# Patient Record
Sex: FEMALE | Race: WHITE | NOT HISPANIC OR LATINO | URBAN - METROPOLITAN AREA
[De-identification: names, ages, dates, MRNs, and addresses within clinical notes are randomized per-mention and may not be internally consistent; named-entity substitution may affect disease eponyms.]

---

## 2022-02-08 ENCOUNTER — OUTPATIENT (OUTPATIENT)
Dept: OUTPATIENT SERVICES | Facility: HOSPITAL | Age: 87
LOS: 1 days | Discharge: HOME | End: 2022-02-08
Payer: MEDICARE

## 2022-02-08 DIAGNOSIS — M79.643 PAIN IN UNSPECIFIED HAND: ICD-10-CM

## 2022-02-08 PROCEDURE — 73130 X-RAY EXAM OF HAND: CPT | Mod: 26,50

## 2022-05-13 ENCOUNTER — OUTPATIENT (OUTPATIENT)
Dept: OUTPATIENT SERVICES | Facility: HOSPITAL | Age: 87
LOS: 1 days | Discharge: HOME | End: 2022-05-13

## 2022-05-13 VITALS
DIASTOLIC BLOOD PRESSURE: 77 MMHG | OXYGEN SATURATION: 98 % | RESPIRATION RATE: 18 BRPM | HEIGHT: 66 IN | TEMPERATURE: 98 F | SYSTOLIC BLOOD PRESSURE: 170 MMHG | WEIGHT: 141.98 LBS | HEART RATE: 83 BPM

## 2022-05-13 VITALS
SYSTOLIC BLOOD PRESSURE: 120 MMHG | DIASTOLIC BLOOD PRESSURE: 57 MMHG | OXYGEN SATURATION: 98 % | RESPIRATION RATE: 18 BRPM

## 2022-05-13 DIAGNOSIS — Z90.710 ACQUIRED ABSENCE OF BOTH CERVIX AND UTERUS: Chronic | ICD-10-CM

## 2022-05-13 DIAGNOSIS — Z90.49 ACQUIRED ABSENCE OF OTHER SPECIFIED PARTS OF DIGESTIVE TRACT: Chronic | ICD-10-CM

## 2022-05-13 NOTE — ASU DISCHARGE PLAN (ADULT/PEDIATRIC) - NS MD DC FALL RISK RISK
For information on Fall & Injury Prevention, visit: [email or web address] OR  [email or web address] OR  https://www.cdc.gov/steadi/patient.html

## 2022-05-13 NOTE — ASU PATIENT PROFILE, ADULT - FALL HARM RISK - HARM RISK INTERVENTIONS

## 2022-05-19 DIAGNOSIS — F17.210 NICOTINE DEPENDENCE, CIGARETTES, UNCOMPLICATED: ICD-10-CM

## 2022-05-19 DIAGNOSIS — I10 ESSENTIAL (PRIMARY) HYPERTENSION: ICD-10-CM

## 2022-05-19 DIAGNOSIS — H26.8 OTHER SPECIFIED CATARACT: ICD-10-CM

## 2022-05-19 DIAGNOSIS — E78.00 PURE HYPERCHOLESTEROLEMIA, UNSPECIFIED: ICD-10-CM

## 2022-05-19 DIAGNOSIS — Z90.710 ACQUIRED ABSENCE OF BOTH CERVIX AND UTERUS: ICD-10-CM

## 2022-05-19 DIAGNOSIS — Z90.49 ACQUIRED ABSENCE OF OTHER SPECIFIED PARTS OF DIGESTIVE TRACT: ICD-10-CM

## 2022-05-27 ENCOUNTER — OUTPATIENT (OUTPATIENT)
Dept: OUTPATIENT SERVICES | Facility: HOSPITAL | Age: 87
LOS: 1 days | Discharge: HOME | End: 2022-05-27

## 2022-05-27 VITALS
RESPIRATION RATE: 17 BRPM | SYSTOLIC BLOOD PRESSURE: 167 MMHG | HEIGHT: 66 IN | DIASTOLIC BLOOD PRESSURE: 76 MMHG | OXYGEN SATURATION: 95 % | TEMPERATURE: 96 F | HEART RATE: 77 BPM | WEIGHT: 141.98 LBS

## 2022-05-27 VITALS — DIASTOLIC BLOOD PRESSURE: 77 MMHG | HEART RATE: 58 BPM | SYSTOLIC BLOOD PRESSURE: 137 MMHG | RESPIRATION RATE: 17 BRPM

## 2022-05-27 DIAGNOSIS — Z90.710 ACQUIRED ABSENCE OF BOTH CERVIX AND UTERUS: Chronic | ICD-10-CM

## 2022-05-27 DIAGNOSIS — Z90.49 ACQUIRED ABSENCE OF OTHER SPECIFIED PARTS OF DIGESTIVE TRACT: Chronic | ICD-10-CM

## 2022-05-27 DIAGNOSIS — Z98.890 OTHER SPECIFIED POSTPROCEDURAL STATES: Chronic | ICD-10-CM

## 2022-05-27 RX ORDER — ATORVASTATIN CALCIUM 80 MG/1
1 TABLET, FILM COATED ORAL
Qty: 0 | Refills: 0 | DISCHARGE

## 2022-05-27 NOTE — ASU PATIENT PROFILE, ADULT - NSICDXPASTSURGICALHX_GEN_ALL_CORE_FT
PAST SURGICAL HISTORY:  H/O: hysterectomy     History of laparoscopic cholecystectomy     History of surgery LEFT EYE CATARACT EXTRACTION WITH LENS  IMPLANT

## 2022-05-27 NOTE — ASU PATIENT PROFILE, ADULT - FALL HARM RISK - UNIVERSAL INTERVENTIONS
Bed in lowest position, wheels locked, appropriate side rails in place/Call bell, personal items and telephone in reach/Instruct patient to call for assistance before getting out of bed or chair/Non-slip footwear when patient is out of bed/[place] to call system/Physically safe environment - no spills, clutter or unnecessary equipment/Purposeful Proactive Rounding/Room/bathroom lighting operational, light cord in reach

## 2022-06-02 DIAGNOSIS — F17.210 NICOTINE DEPENDENCE, CIGARETTES, UNCOMPLICATED: ICD-10-CM

## 2022-06-02 DIAGNOSIS — H26.8 OTHER SPECIFIED CATARACT: ICD-10-CM

## 2022-06-02 DIAGNOSIS — I10 ESSENTIAL (PRIMARY) HYPERTENSION: ICD-10-CM

## 2022-06-02 DIAGNOSIS — Z90.710 ACQUIRED ABSENCE OF BOTH CERVIX AND UTERUS: ICD-10-CM

## 2022-06-02 DIAGNOSIS — Z90.49 ACQUIRED ABSENCE OF OTHER SPECIFIED PARTS OF DIGESTIVE TRACT: ICD-10-CM

## 2022-06-02 DIAGNOSIS — E78.00 PURE HYPERCHOLESTEROLEMIA, UNSPECIFIED: ICD-10-CM

## 2022-11-30 PROBLEM — F17.210 NICOTINE DEPENDENCE, CIGARETTES, UNCOMPLICATED: Chronic | Status: ACTIVE | Noted: 2022-05-13

## 2022-11-30 PROBLEM — E78.00 PURE HYPERCHOLESTEROLEMIA, UNSPECIFIED: Chronic | Status: ACTIVE | Noted: 2022-05-13

## 2022-11-30 PROBLEM — I10 ESSENTIAL (PRIMARY) HYPERTENSION: Chronic | Status: ACTIVE | Noted: 2022-05-13

## 2023-01-03 ENCOUNTER — APPOINTMENT (OUTPATIENT)
Dept: RHEUMATOLOGY | Facility: CLINIC | Age: 88
End: 2023-01-03

## 2023-02-08 PROBLEM — Z00.00 ENCOUNTER FOR PREVENTIVE HEALTH EXAMINATION: Status: ACTIVE | Noted: 2023-02-08

## 2023-11-17 NOTE — ASU PATIENT PROFILE, ADULT - CAREGIVER PHONE NUMBER
Detail Level: Simple [phone] Detail Level: Detailed Initiate Treatment: triamcinolone acetonide 0.1 % topical cream BID\\nQuantity: 80.0 g  Days Supply: 30\\nSig: Apply twice daily to rash up to 2 weeks/month as needed

## 2024-03-05 ENCOUNTER — INPATIENT (INPATIENT)
Facility: HOSPITAL | Age: 89
LOS: 6 days | Discharge: NURSING, REHAB OR LONG-TERM CARE | DRG: 521 | End: 2024-03-12
Attending: STUDENT IN AN ORGANIZED HEALTH CARE EDUCATION/TRAINING PROGRAM | Admitting: STUDENT IN AN ORGANIZED HEALTH CARE EDUCATION/TRAINING PROGRAM
Payer: MEDICARE

## 2024-03-05 VITALS
TEMPERATURE: 98 F | OXYGEN SATURATION: 99 % | SYSTOLIC BLOOD PRESSURE: 176 MMHG | DIASTOLIC BLOOD PRESSURE: 72 MMHG | HEART RATE: 104 BPM

## 2024-03-05 DIAGNOSIS — Z90.710 ACQUIRED ABSENCE OF BOTH CERVIX AND UTERUS: Chronic | ICD-10-CM

## 2024-03-05 DIAGNOSIS — Z90.49 ACQUIRED ABSENCE OF OTHER SPECIFIED PARTS OF DIGESTIVE TRACT: Chronic | ICD-10-CM

## 2024-03-05 DIAGNOSIS — S72.002A FRACTURE OF UNSPECIFIED PART OF NECK OF LEFT FEMUR, INITIAL ENCOUNTER FOR CLOSED FRACTURE: ICD-10-CM

## 2024-03-05 DIAGNOSIS — Z98.890 OTHER SPECIFIED POSTPROCEDURAL STATES: Chronic | ICD-10-CM

## 2024-03-05 LAB
ALBUMIN SERPL ELPH-MCNC: 4.1 G/DL — SIGNIFICANT CHANGE UP (ref 3.5–5.2)
ALP SERPL-CCNC: 87 U/L — SIGNIFICANT CHANGE UP (ref 30–115)
ALT FLD-CCNC: 17 U/L — SIGNIFICANT CHANGE UP (ref 0–41)
ANION GAP SERPL CALC-SCNC: 10 MMOL/L — SIGNIFICANT CHANGE UP (ref 7–14)
APTT BLD: 28.2 SEC — SIGNIFICANT CHANGE UP (ref 27–39.2)
AST SERPL-CCNC: 14 U/L — SIGNIFICANT CHANGE UP (ref 0–41)
BASOPHILS # BLD AUTO: 0.06 K/UL — SIGNIFICANT CHANGE UP (ref 0–0.2)
BASOPHILS NFR BLD AUTO: 0.6 % — SIGNIFICANT CHANGE UP (ref 0–1)
BILIRUB SERPL-MCNC: 0.2 MG/DL — SIGNIFICANT CHANGE UP (ref 0.2–1.2)
BUN SERPL-MCNC: 22 MG/DL — HIGH (ref 10–20)
CALCIUM SERPL-MCNC: 8.9 MG/DL — SIGNIFICANT CHANGE UP (ref 8.4–10.5)
CHLORIDE SERPL-SCNC: 99 MMOL/L — SIGNIFICANT CHANGE UP (ref 98–110)
CO2 SERPL-SCNC: 28 MMOL/L — SIGNIFICANT CHANGE UP (ref 17–32)
CREAT SERPL-MCNC: 1 MG/DL — SIGNIFICANT CHANGE UP (ref 0.7–1.5)
EGFR: 55 ML/MIN/1.73M2 — LOW
EOSINOPHIL # BLD AUTO: 0.06 K/UL — SIGNIFICANT CHANGE UP (ref 0–0.7)
EOSINOPHIL NFR BLD AUTO: 0.6 % — SIGNIFICANT CHANGE UP (ref 0–8)
GLUCOSE SERPL-MCNC: 171 MG/DL — HIGH (ref 70–99)
HCT VFR BLD CALC: 42.3 % — SIGNIFICANT CHANGE UP (ref 37–47)
HGB BLD-MCNC: 14.2 G/DL — SIGNIFICANT CHANGE UP (ref 12–16)
IMM GRANULOCYTES NFR BLD AUTO: 0.8 % — HIGH (ref 0.1–0.3)
INR BLD: 1.03 RATIO — SIGNIFICANT CHANGE UP (ref 0.65–1.3)
LYMPHOCYTES # BLD AUTO: 3.32 K/UL — SIGNIFICANT CHANGE UP (ref 1.2–3.4)
LYMPHOCYTES # BLD AUTO: 31.9 % — SIGNIFICANT CHANGE UP (ref 20.5–51.1)
MCHC RBC-ENTMCNC: 28.7 PG — SIGNIFICANT CHANGE UP (ref 27–31)
MCHC RBC-ENTMCNC: 33.6 G/DL — SIGNIFICANT CHANGE UP (ref 32–37)
MCV RBC AUTO: 85.6 FL — SIGNIFICANT CHANGE UP (ref 81–99)
MONOCYTES # BLD AUTO: 0.85 K/UL — HIGH (ref 0.1–0.6)
MONOCYTES NFR BLD AUTO: 8.2 % — SIGNIFICANT CHANGE UP (ref 1.7–9.3)
NEUTROPHILS # BLD AUTO: 6.03 K/UL — SIGNIFICANT CHANGE UP (ref 1.4–6.5)
NEUTROPHILS NFR BLD AUTO: 57.9 % — SIGNIFICANT CHANGE UP (ref 42.2–75.2)
NRBC # BLD: 0 /100 WBCS — SIGNIFICANT CHANGE UP (ref 0–0)
PLATELET # BLD AUTO: 249 K/UL — SIGNIFICANT CHANGE UP (ref 130–400)
PMV BLD: 9.7 FL — SIGNIFICANT CHANGE UP (ref 7.4–10.4)
POTASSIUM SERPL-MCNC: 3.5 MMOL/L — SIGNIFICANT CHANGE UP (ref 3.5–5)
POTASSIUM SERPL-SCNC: 3.5 MMOL/L — SIGNIFICANT CHANGE UP (ref 3.5–5)
PROT SERPL-MCNC: 6.9 G/DL — SIGNIFICANT CHANGE UP (ref 6–8)
PROTHROM AB SERPL-ACNC: 11.8 SEC — SIGNIFICANT CHANGE UP (ref 9.95–12.87)
RBC # BLD: 4.94 M/UL — SIGNIFICANT CHANGE UP (ref 4.2–5.4)
RBC # FLD: 14.8 % — HIGH (ref 11.5–14.5)
SODIUM SERPL-SCNC: 137 MMOL/L — SIGNIFICANT CHANGE UP (ref 135–146)
WBC # BLD: 10.4 K/UL — SIGNIFICANT CHANGE UP (ref 4.8–10.8)
WBC # FLD AUTO: 10.4 K/UL — SIGNIFICANT CHANGE UP (ref 4.8–10.8)

## 2024-03-05 PROCEDURE — 85610 PROTHROMBIN TIME: CPT

## 2024-03-05 PROCEDURE — 94640 AIRWAY INHALATION TREATMENT: CPT

## 2024-03-05 PROCEDURE — 83935 ASSAY OF URINE OSMOLALITY: CPT

## 2024-03-05 PROCEDURE — 85730 THROMBOPLASTIN TIME PARTIAL: CPT

## 2024-03-05 PROCEDURE — 88311 DECALCIFY TISSUE: CPT

## 2024-03-05 PROCEDURE — 70450 CT HEAD/BRAIN W/O DYE: CPT | Mod: 26,MC

## 2024-03-05 PROCEDURE — C1776: CPT

## 2024-03-05 PROCEDURE — 84133 ASSAY OF URINE POTASSIUM: CPT

## 2024-03-05 PROCEDURE — 71045 X-RAY EXAM CHEST 1 VIEW: CPT | Mod: 26

## 2024-03-05 PROCEDURE — 36415 COLL VENOUS BLD VENIPUNCTURE: CPT

## 2024-03-05 PROCEDURE — 93005 ELECTROCARDIOGRAM TRACING: CPT

## 2024-03-05 PROCEDURE — 73562 X-RAY EXAM OF KNEE 3: CPT | Mod: 26,LT

## 2024-03-05 PROCEDURE — 83930 ASSAY OF BLOOD OSMOLALITY: CPT

## 2024-03-05 PROCEDURE — 80048 BASIC METABOLIC PNL TOTAL CA: CPT

## 2024-03-05 PROCEDURE — 88305 TISSUE EXAM BY PATHOLOGIST: CPT

## 2024-03-05 PROCEDURE — 84300 ASSAY OF URINE SODIUM: CPT

## 2024-03-05 PROCEDURE — 93010 ELECTROCARDIOGRAM REPORT: CPT

## 2024-03-05 PROCEDURE — 97116 GAIT TRAINING THERAPY: CPT | Mod: GP

## 2024-03-05 PROCEDURE — 97110 THERAPEUTIC EXERCISES: CPT | Mod: GP

## 2024-03-05 PROCEDURE — 81003 URINALYSIS AUTO W/O SCOPE: CPT

## 2024-03-05 PROCEDURE — C1889: CPT

## 2024-03-05 PROCEDURE — 86850 RBC ANTIBODY SCREEN: CPT

## 2024-03-05 PROCEDURE — 84484 ASSAY OF TROPONIN QUANT: CPT

## 2024-03-05 PROCEDURE — 72125 CT NECK SPINE W/O DYE: CPT | Mod: 26,MC

## 2024-03-05 PROCEDURE — 97162 PT EVAL MOD COMPLEX 30 MIN: CPT | Mod: GP

## 2024-03-05 PROCEDURE — 72170 X-RAY EXAM OF PELVIS: CPT

## 2024-03-05 PROCEDURE — C1713: CPT

## 2024-03-05 PROCEDURE — 86900 BLOOD TYPING SEROLOGIC ABO: CPT

## 2024-03-05 PROCEDURE — 72170 X-RAY EXAM OF PELVIS: CPT | Mod: 26

## 2024-03-05 PROCEDURE — 83735 ASSAY OF MAGNESIUM: CPT

## 2024-03-05 PROCEDURE — 71045 X-RAY EXAM CHEST 1 VIEW: CPT

## 2024-03-05 PROCEDURE — 87635 SARS-COV-2 COVID-19 AMP PRB: CPT

## 2024-03-05 PROCEDURE — 73552 X-RAY EXAM OF FEMUR 2/>: CPT | Mod: 26,LT

## 2024-03-05 PROCEDURE — 80053 COMPREHEN METABOLIC PANEL: CPT

## 2024-03-05 PROCEDURE — 85025 COMPLETE CBC W/AUTO DIFF WBC: CPT

## 2024-03-05 PROCEDURE — 97166 OT EVAL MOD COMPLEX 45 MIN: CPT | Mod: GO

## 2024-03-05 PROCEDURE — 86901 BLOOD TYPING SEROLOGIC RH(D): CPT

## 2024-03-05 PROCEDURE — C9399: CPT

## 2024-03-05 PROCEDURE — 99285 EMERGENCY DEPT VISIT HI MDM: CPT

## 2024-03-05 PROCEDURE — 93306 TTE W/DOPPLER COMPLETE: CPT

## 2024-03-05 PROCEDURE — 97530 THERAPEUTIC ACTIVITIES: CPT | Mod: GP

## 2024-03-05 RX ORDER — MORPHINE SULFATE 50 MG/1
4 CAPSULE, EXTENDED RELEASE ORAL ONCE
Refills: 0 | Status: DISCONTINUED | OUTPATIENT
Start: 2024-03-05 | End: 2024-03-05

## 2024-03-05 RX ORDER — HYDROMORPHONE HYDROCHLORIDE 2 MG/ML
1 INJECTION INTRAMUSCULAR; INTRAVENOUS; SUBCUTANEOUS ONCE
Refills: 0 | Status: DISCONTINUED | OUTPATIENT
Start: 2024-03-05 | End: 2024-03-05

## 2024-03-05 RX ADMIN — HYDROMORPHONE HYDROCHLORIDE 1 MILLIGRAM(S): 2 INJECTION INTRAMUSCULAR; INTRAVENOUS; SUBCUTANEOUS at 23:44

## 2024-03-05 RX ADMIN — HYDROMORPHONE HYDROCHLORIDE 1 MILLIGRAM(S): 2 INJECTION INTRAMUSCULAR; INTRAVENOUS; SUBCUTANEOUS at 20:24

## 2024-03-05 RX ADMIN — MORPHINE SULFATE 4 MILLIGRAM(S): 50 CAPSULE, EXTENDED RELEASE ORAL at 20:10

## 2024-03-05 NOTE — ED PROVIDER NOTE - PHYSICAL EXAMINATION
Physical Exam    Vital Signs: I have reviewed the initial vital signs.  Constitutional: well-nourished, appears stated age, no acute distress  Eyes: Conjunctiva pink, Sclera clear, PERRLA, EOMI without pain.   Cardiovascular: S1 and S2, regular rate, regular rhythm, well-perfused extremities, pedal pulses equal and 2+ b/l.   Respiratory: unlabored respiratory effort, clear to auscultation bilaterally no wheezing, rales and rhonchi. pt is speaking full sentences. no accessory muscle use.   Gastrointestinal: soft, non-tender, nondistended abdomen, no pulsatile mass, no rebound, no guarding  Musculoskeletal: supple neck, no lower extremity edema, no calf tenderness, no midline tenderness, no palpable spinal step offs, (+) tenderness over the left hip, unable to move left lower extremity due to pain, pt left lower extremity is shortened, flexed at the knee, and externally rotated. FROM of b/l upper extremities and right upper extremity.   Integumentary: warm, dry, no rash  Neurologic: awake, alert, extremities’ motor and sensory functions grossly intact.   Psychiatric: appropriate mood, appropriate affect

## 2024-03-05 NOTE — ED ADULT TRIAGE NOTE - CHIEF COMPLAINT QUOTE
Pt presents to the ED w/ c/o of left hip pain fall going up steps. Pt was found by EMS at the top of the steps. No HT, no LOC, no AC. Pt's left hip is currently in pain.

## 2024-03-05 NOTE — ED ADULT NURSE NOTE - NSFALLHARMRISKINTERV_ED_ALL_ED
Detail Level: Simple Additional Notes: Patient consent was obtained to proceed with the visit and recommended plan of care after discussion of all risks and benefits, including the risks of COVID-19 exposure. Assistance OOB with selected safe patient handling equipment if applicable/Assistance with ambulation/Communicate risk of Fall with Harm to all staff, patient, and family/Monitor gait and stability/Provide visual cue: red socks, yellow wristband, yellow gown, etc/Reinforce activity limits and safety measures with patient and family/Bed in lowest position, wheels locked, appropriate side rails in place/Call bell, personal items and telephone in reach/Instruct patient to call for assistance before getting out of bed/chair/stretcher/Non-slip footwear applied when patient is off stretcher/[place] to call system/Physically safe environment - no spills, clutter or unnecessary equipment/Purposeful Proactive Rounding/Room/bathroom lighting operational, light cord in reach

## 2024-03-05 NOTE — ED PROVIDER NOTE - CLINICAL SUMMARY MEDICAL DECISION MAKING FREE TEXT BOX
88yF p/w L thigh pain after fall.  Pt quite uncomfortable and LLE shortened, rotated and flexed at the knee.  Imaging confirm L impacted femoral neck fx.  Pt NVI despite fx and pain treated w/ multiple doses of opiates.  Labs reassuring.  D/w ortho and will adm to medicine for further care.

## 2024-03-05 NOTE — ED PROVIDER NOTE - OBJECTIVE STATEMENT
88-year-old female with a past medical history of hypertension, hyperlipidemia, dementia presents to the ED for evaluation of left hip pain and deformity status post mechanical fall after running up the stairs this evening.  Patient reports the stairs to answer the telephone when she got to the top of the steps she tripped and fell landing on her left side.  Patient reports she was unable to get up on her own and she was found sitting on the floor with her back up against the wall by a family member who lives above her.  Patient was recently admitted at [facility] for disorientation.  Patient denies headaches, LOC, head trauma, use of blood thinners, neck pain, back pain, chest pain, shortness of breath, abdominal pain, nausea, vomiting, diarrhea, constipation, urinary symptoms, weakness, numbness, or tingling.

## 2024-03-05 NOTE — ED PROVIDER NOTE - ATTENDING APP SHARED VISIT CONTRIBUTION OF CARE
88yF HTN DLD ?dementia p/w fall on stairs - was almost at the top stair when she tripped bc she was running.  C/o severe L posterior upper thigh pain and is holding leg w/ knee midflexed, shortened and rotated and screaming in pain with transitions (or bumps in the road as per EMS).

## 2024-03-06 ENCOUNTER — RESULT REVIEW (OUTPATIENT)
Age: 89
End: 2024-03-06

## 2024-03-06 LAB — TROPONIN T, HIGH SENSITIVITY RESULT: 28 NG/L — HIGH (ref 6–13)

## 2024-03-06 PROCEDURE — 99222 1ST HOSP IP/OBS MODERATE 55: CPT

## 2024-03-06 PROCEDURE — 88311 DECALCIFY TISSUE: CPT | Mod: 26

## 2024-03-06 PROCEDURE — 88305 TISSUE EXAM BY PATHOLOGIST: CPT | Mod: 26

## 2024-03-06 PROCEDURE — 72170 X-RAY EXAM OF PELVIS: CPT | Mod: 26

## 2024-03-06 PROCEDURE — 93306 TTE W/DOPPLER COMPLETE: CPT | Mod: 26

## 2024-03-06 PROCEDURE — 27236 TREAT THIGH FRACTURE: CPT | Mod: LT

## 2024-03-06 PROCEDURE — 93010 ELECTROCARDIOGRAM REPORT: CPT

## 2024-03-06 RX ORDER — HEPARIN SODIUM 5000 [USP'U]/ML
5000 INJECTION INTRAVENOUS; SUBCUTANEOUS EVERY 12 HOURS
Refills: 0 | Status: DISCONTINUED | OUTPATIENT
Start: 2024-03-07 | End: 2024-03-12

## 2024-03-06 RX ORDER — AMLODIPINE BESYLATE 2.5 MG/1
10 TABLET ORAL DAILY
Refills: 0 | Status: DISCONTINUED | OUTPATIENT
Start: 2024-03-06 | End: 2024-03-12

## 2024-03-06 RX ORDER — SENNA PLUS 8.6 MG/1
2 TABLET ORAL AT BEDTIME
Refills: 0 | Status: DISCONTINUED | OUTPATIENT
Start: 2024-03-06 | End: 2024-03-06

## 2024-03-06 RX ORDER — ACETAMINOPHEN 500 MG
650 TABLET ORAL EVERY 6 HOURS
Refills: 0 | Status: DISCONTINUED | OUTPATIENT
Start: 2024-03-06 | End: 2024-03-12

## 2024-03-06 RX ORDER — AMLODIPINE BESYLATE 2.5 MG/1
10 TABLET ORAL DAILY
Refills: 0 | Status: DISCONTINUED | OUTPATIENT
Start: 2024-03-06 | End: 2024-03-06

## 2024-03-06 RX ORDER — HYDROMORPHONE HYDROCHLORIDE 2 MG/ML
1 INJECTION INTRAMUSCULAR; INTRAVENOUS; SUBCUTANEOUS EVERY 4 HOURS
Refills: 0 | Status: DISCONTINUED | OUTPATIENT
Start: 2024-03-06 | End: 2024-03-12

## 2024-03-06 RX ORDER — ACETAMINOPHEN 500 MG
650 TABLET ORAL EVERY 6 HOURS
Refills: 0 | Status: DISCONTINUED | OUTPATIENT
Start: 2024-03-06 | End: 2024-03-06

## 2024-03-06 RX ORDER — HEPARIN SODIUM 5000 [USP'U]/ML
5000 INJECTION INTRAVENOUS; SUBCUTANEOUS ONCE
Refills: 0 | Status: COMPLETED | OUTPATIENT
Start: 2024-03-06 | End: 2024-03-06

## 2024-03-06 RX ORDER — DONEPEZIL HYDROCHLORIDE 10 MG/1
10 TABLET, FILM COATED ORAL AT BEDTIME
Refills: 0 | Status: DISCONTINUED | OUTPATIENT
Start: 2024-03-06 | End: 2024-03-12

## 2024-03-06 RX ORDER — DONEPEZIL HYDROCHLORIDE 10 MG/1
1 TABLET, FILM COATED ORAL
Refills: 0 | DISCHARGE

## 2024-03-06 RX ORDER — OXYCODONE AND ACETAMINOPHEN 5; 325 MG/1; MG/1
1 TABLET ORAL EVERY 6 HOURS
Refills: 0 | Status: DISCONTINUED | OUTPATIENT
Start: 2024-03-06 | End: 2024-03-07

## 2024-03-06 RX ORDER — CEFAZOLIN SODIUM 1 G
2000 VIAL (EA) INJECTION EVERY 8 HOURS
Refills: 0 | Status: COMPLETED | OUTPATIENT
Start: 2024-03-06 | End: 2024-03-07

## 2024-03-06 RX ORDER — SODIUM CHLORIDE 9 MG/ML
1000 INJECTION INTRAMUSCULAR; INTRAVENOUS; SUBCUTANEOUS
Refills: 0 | Status: DISCONTINUED | OUTPATIENT
Start: 2024-03-06 | End: 2024-03-07

## 2024-03-06 RX ORDER — SENNA PLUS 8.6 MG/1
2 TABLET ORAL AT BEDTIME
Refills: 0 | Status: DISCONTINUED | OUTPATIENT
Start: 2024-03-06 | End: 2024-03-12

## 2024-03-06 RX ORDER — ATORVASTATIN CALCIUM 80 MG/1
40 TABLET, FILM COATED ORAL AT BEDTIME
Refills: 0 | Status: DISCONTINUED | OUTPATIENT
Start: 2024-03-06 | End: 2024-03-12

## 2024-03-06 RX ORDER — HYDROMORPHONE HYDROCHLORIDE 2 MG/ML
1 INJECTION INTRAMUSCULAR; INTRAVENOUS; SUBCUTANEOUS EVERY 4 HOURS
Refills: 0 | Status: DISCONTINUED | OUTPATIENT
Start: 2024-03-06 | End: 2024-03-06

## 2024-03-06 RX ORDER — HEPARIN SODIUM 5000 [USP'U]/ML
5000 INJECTION INTRAVENOUS; SUBCUTANEOUS EVERY 12 HOURS
Refills: 0 | Status: DISCONTINUED | OUTPATIENT
Start: 2024-03-06 | End: 2024-03-06

## 2024-03-06 RX ORDER — ATORVASTATIN CALCIUM 80 MG/1
40 TABLET, FILM COATED ORAL AT BEDTIME
Refills: 0 | Status: DISCONTINUED | OUTPATIENT
Start: 2024-03-06 | End: 2024-03-06

## 2024-03-06 RX ORDER — HYDROMORPHONE HYDROCHLORIDE 2 MG/ML
1 INJECTION INTRAMUSCULAR; INTRAVENOUS; SUBCUTANEOUS ONCE
Refills: 0 | Status: DISCONTINUED | OUTPATIENT
Start: 2024-03-06 | End: 2024-03-06

## 2024-03-06 RX ORDER — AMLODIPINE BESYLATE 2.5 MG/1
1 TABLET ORAL
Qty: 0 | Refills: 0 | DISCHARGE

## 2024-03-06 RX ORDER — POLYETHYLENE GLYCOL 3350 17 G/17G
17 POWDER, FOR SOLUTION ORAL
Refills: 0 | Status: DISCONTINUED | OUTPATIENT
Start: 2024-03-06 | End: 2024-03-06

## 2024-03-06 RX ORDER — POLYETHYLENE GLYCOL 3350 17 G/17G
17 POWDER, FOR SOLUTION ORAL
Refills: 0 | Status: DISCONTINUED | OUTPATIENT
Start: 2024-03-06 | End: 2024-03-12

## 2024-03-06 RX ORDER — DONEPEZIL HYDROCHLORIDE 10 MG/1
10 TABLET, FILM COATED ORAL AT BEDTIME
Refills: 0 | Status: DISCONTINUED | OUTPATIENT
Start: 2024-03-06 | End: 2024-03-06

## 2024-03-06 RX ORDER — HEPARIN SODIUM 5000 [USP'U]/ML
5000 INJECTION INTRAVENOUS; SUBCUTANEOUS ONCE
Refills: 0 | Status: DISCONTINUED | OUTPATIENT
Start: 2024-03-06 | End: 2024-03-06

## 2024-03-06 RX ADMIN — HYDROMORPHONE HYDROCHLORIDE 1 MILLIGRAM(S): 2 INJECTION INTRAMUSCULAR; INTRAVENOUS; SUBCUTANEOUS at 23:45

## 2024-03-06 RX ADMIN — HYDROMORPHONE HYDROCHLORIDE 1 MILLIGRAM(S): 2 INJECTION INTRAMUSCULAR; INTRAVENOUS; SUBCUTANEOUS at 13:55

## 2024-03-06 RX ADMIN — HYDROMORPHONE HYDROCHLORIDE 1 MILLIGRAM(S): 2 INJECTION INTRAMUSCULAR; INTRAVENOUS; SUBCUTANEOUS at 09:41

## 2024-03-06 RX ADMIN — HYDROMORPHONE HYDROCHLORIDE 1 MILLIGRAM(S): 2 INJECTION INTRAMUSCULAR; INTRAVENOUS; SUBCUTANEOUS at 03:02

## 2024-03-06 RX ADMIN — HEPARIN SODIUM 5000 UNIT(S): 5000 INJECTION INTRAVENOUS; SUBCUTANEOUS at 06:03

## 2024-03-06 RX ADMIN — SODIUM CHLORIDE 75 MILLILITER(S): 9 INJECTION INTRAMUSCULAR; INTRAVENOUS; SUBCUTANEOUS at 06:04

## 2024-03-06 RX ADMIN — POLYETHYLENE GLYCOL 3350 17 GRAM(S): 17 POWDER, FOR SOLUTION ORAL at 06:02

## 2024-03-06 RX ADMIN — Medication 100 MILLIGRAM(S): at 22:17

## 2024-03-06 RX ADMIN — ATORVASTATIN CALCIUM 40 MILLIGRAM(S): 80 TABLET, FILM COATED ORAL at 22:18

## 2024-03-06 RX ADMIN — HYDROMORPHONE HYDROCHLORIDE 1 MILLIGRAM(S): 2 INJECTION INTRAMUSCULAR; INTRAVENOUS; SUBCUTANEOUS at 04:01

## 2024-03-06 RX ADMIN — HYDROMORPHONE HYDROCHLORIDE 1 MILLIGRAM(S): 2 INJECTION INTRAMUSCULAR; INTRAVENOUS; SUBCUTANEOUS at 08:06

## 2024-03-06 RX ADMIN — SENNA PLUS 2 TABLET(S): 8.6 TABLET ORAL at 22:17

## 2024-03-06 RX ADMIN — DONEPEZIL HYDROCHLORIDE 10 MILLIGRAM(S): 10 TABLET, FILM COATED ORAL at 22:18

## 2024-03-06 RX ADMIN — Medication 650 MILLIGRAM(S): at 11:42

## 2024-03-06 NOTE — H&P ADULT - ASSESSMENT
Patient is an 89 yo F w/PMH of HTN, HLD, and dementia who presented to the ED with L hip pain and deformity after tripping on the stairs, falling to her left side, and being unable to get on her own now admitted to medicine for management of L femoral hip fracture pending L hip hemiarthroplasty by orthopedic surgery.    #Mechanical fall, no syncope  #L femoral hip fracture  *Pelvic XR (3/5): LEFT femoral neck fracture with mild superior displacement and impaction. No right hip fracture.  *L femur XR (3/5): Acute femoral neck fracture with slight superior dislocation and impaction. No shaft fracture.  *CXR (3/5): No focal consolidation, pneumothorax or pleural effusion. Unremarkable cardiomediastinal silhouette. No radiographically evident acute displaced fracture within the limitations of this exam.  - Orthopedic surgery recs (3/5): plan for Left hip hemiarthroplasty 3/6/2024 pending medical clearance and OR availabilty, Bed rest, HOLD DVT PPx, Adult Medicine pre-op risk stratification / medical optimization, 2u RBC on hold for surgery, NPO for surgery today 3/6  - Pending      #MISC  - DVT PPx: On SCDs, holding Lovenox SQ  - GI PPx:   - Diet: NPO for procedure  - Activity: Bedrest Patient is an 89 yo F w/PMH of HTN, HLD, and dementia who presented to the ED with L hip pain and deformity after tripping on the stairs, falling to her left side, and being unable to get on her own now admitted to medicine for management of L femoral hip fracture pending L hip hemiarthroplasty by orthopedic surgery.    #Mechanical fall, no syncope  #L femoral hip fracture  *Pelvic XR (3/5): LEFT femoral neck fracture with mild superior displacement and impaction. No right hip fracture.  *L femur XR (3/5): Acute femoral neck fracture with slight superior dislocation and impaction. No shaft fracture.  *CXR (3/5): No focal consolidation, pneumothorax or pleural effusion. Unremarkable cardiomediastinal silhouette. No radiographically evident acute displaced fracture within the limitations of this exam.  - Orthopedic surgery recs (3/5): plan for Left hip hemiarthroplasty 3/6/2024 pending medical clearance and OR availabilty, Bed rest, HOLD DVT PPx, Adult Medicine pre-op risk stratification / medical optimization, 2u RBC on hold for surgery, NPO for surgery today 3/6  - Pending L hip arthroplasty on 3/6, NPO, IVF  - Pending pre-op clearance  - Pain management: Dilaudid IV 1 mg q4h PRN for severe pain, oxycodone 5 mg q6h PRN for moderate pain, tylenol 975 mg tid standing,   - Bowel reg: Senna and miralax    #HTN    #HLD    #Dementia    #MISC  - DVT PPx: On SCDs, holding Lovenox SQ  - GI PPx:   - Diet: NPO for procedure  - Activity: Bedrest Patient is an 89 yo F w/PMH of HTN, HLD, and dementia who presented to the ED with L hip pain and deformity after tripping on the stairs, falling to her left side, and being unable to get on her own now admitted to medicine for management of L femoral hip fracture pending L hip hemiarthroplasty by orthopedic surgery.    *Please obtain med rec in AM, patient does not remember her meds and does not have a list with her, daughter will be here in the AM.    #Mechanical fall, no syncope  #L femoral hip fracture  *Pelvic XR (3/5): LEFT femoral neck fracture with mild superior displacement and impaction. No right hip fracture.  *L femur XR (3/5): Acute femoral neck fracture with slight superior dislocation and impaction. No shaft fracture.  *CXR (3/5): No focal consolidation, pneumothorax or pleural effusion. Unremarkable cardiomediastinal silhouette. No radiographically evident acute displaced fracture within the limitations of this exam.  - Orthopedic surgery recs (3/5): plan for Left hip hemiarthroplasty 3/6/2024 pending medical clearance and OR availabilty, Bed rest, HOLD DVT PPx, Adult Medicine pre-op risk stratification / medical optimization, 2u RBC on hold for surgery, NPO for surgery today 3/6  - Pending L hip arthroplasty on 3/6, NPO, IVF  - Pending pre-op clearance  - Pain management: Dilaudid IV 1 mg q4h PRN for severe pain, oxycodone 5 mg q6h PRN for moderate pain, tylenol 975 mg tid standing,   - Bowel reg: Senna and miralax    #HTN  - Obtain med rec in AM    #HLD  - Obtain med rec in AM    #Dementia  Patient reports worsening forgetfulness lately  - Patient reports she has an appointment with a neurologist outpatient    #MISC  - DVT PPx: On SCDs, holding Lovenox SQ  - GI PPx: Not indicated  - Diet: NPO for procedure  - Activity: Bedrest Patient is an 89 yo F w/PMH of HTN, HLD, and dementia who presented to the ED with L hip pain and deformity after tripping on the stairs, falling to her left side, and being unable to get on her own now admitted to medicine for management of L femoral hip fracture pending L hip hemiarthroplasty by orthopedic surgery.    *Please obtain med rec in AM, patient does not remember her meds and does not have a list with her, daughter will be here in the AM.    #Mechanical fall, no syncope  #L femoral hip fracture  *Pelvic XR (3/5): LEFT femoral neck fracture with mild superior displacement and impaction. No right hip fracture.  *L femur XR (3/5): Acute femoral neck fracture with slight superior dislocation and impaction. No shaft fracture.  *CXR (3/5): No focal consolidation, pneumothorax or pleural effusion. Unremarkable cardiomediastinal silhouette. No radiographically evident acute displaced fracture within the limitations of this exam.  - Orthopedic surgery recs (3/5): plan for Left hip hemiarthroplasty 3/6/2024 pending medical clearance and OR availabilty, Bed rest, HOLD DVT PPx, Adult Medicine pre-op risk stratification / medical optimization, 2u RBC on hold for surgery, NPO for surgery today 3/6  - Pending L hip arthroplasty on 3/6, NPO, IVF  - Pending pre-op clearance  - Pain management: Dilaudid IV 1 mg q4h PRN for severe pain, oxycodone 5 mg q6h PRN for moderate pain, tylenol 650 mg q6h prn mild pain  - Bowel reg: Senna and miralax    #HTN  - Obtain med rec in AM    #HLD  - Obtain med rec in AM    #Dementia  Patient reports worsening forgetfulness lately  - Patient reports she has an appointment with a neurologist outpatient    #MISC  - DVT PPx: On SCDs, holding Lovenox SQ  - GI PPx: Not indicated  - Diet: NPO for procedure  - Activity: Bedrest Patient is an 89 yo F w/PMH of HTN, HLD, and dementia who presented to the ED with L hip pain and deformity after tripping on the stairs, falling to her left side, and being unable to get on her own now admitted to medicine for management of L femoral hip fracture pending L hip hemiarthroplasty by orthopedic surgery.    *Please obtain med rec in AM, patient does not remember her meds and does not have a list with her, daughter will be here in the AM.    #Mechanical fall, no syncope  #L femoral hip fracture  *Pelvic XR (3/5): LEFT femoral neck fracture with mild superior displacement and impaction. No right hip fracture.  *L femur XR (3/5): Acute femoral neck fracture with slight superior dislocation and impaction. No shaft fracture.  *CXR (3/5): No focal consolidation, pneumothorax or pleural effusion. Unremarkable cardiomediastinal silhouette. No radiographically evident acute displaced fracture within the limitations of this exam.  - Orthopedic surgery recs (3/5): plan for Left hip hemiarthroplasty 3/6/2024 pending medical clearance and OR availabilty, Bed rest, HOLD DVT PPx, Adult Medicine pre-op risk stratification / medical optimization, 2u RBC on hold for surgery, NPO for surgery today 3/6  - Pending L hip arthroplasty on 3/6, NPO, IVF  - Pending pre-op clearance, patient is ambulatory without a walker at baseline and is able to walk for an hour without feeling short of breath, her outpatient EKG shows sinus bradycardia with   - Pain management: Dilaudid IV 1 mg q4h PRN for severe pain, oxycodone 5 mg q6h PRN for moderate pain, tylenol 650 mg q6h prn mild pain  - Bowel reg: Senna and miralax    #HTN  - Obtain med rec in AM    #HLD  - Obtain med rec in AM    #Dementia  Patient reports worsening forgetfulness lately  - Patient reports she has an appointment with a neurologist outpatient    #MISC  - DVT PPx: On SCDs, holding Lovenox SQ  - GI PPx: Not indicated  - Diet: NPO for procedure  - Activity: Bedrest Patient is an 89 yo F w/PMH of HTN, HLD, and dementia who presented to the ED with L hip pain and deformity after tripping on the stairs, falling to her left side, and being unable to get on her own now admitted to medicine for management of L femoral hip fracture pending L hip hemiarthroplasty by orthopedic surgery.    *Please obtain med rec in AM, patient does not remember her meds and does not have a list with her, daughter will be here in the AM.    #Mechanical fall, no syncope  #L femoral hip fracture  *Pelvic XR (3/5): LEFT femoral neck fracture with mild superior displacement and impaction. No right hip fracture.  *L femur XR (3/5): Acute femoral neck fracture with slight superior dislocation and impaction. No shaft fracture.  *CXR (3/5): No focal consolidation, pneumothorax or pleural effusion. Unremarkable cardiomediastinal silhouette. No radiographically evident acute displaced fracture within the limitations of this exam.  - Orthopedic surgery recs (3/5): plan for Left hip hemiarthroplasty 3/6/2024 pending medical clearance and OR availabilty, Bed rest, HOLD DVT PPx, Adult Medicine pre-op risk stratification / medical optimization, 2u RBC on hold for surgery, NPO for surgery today 3/6  - Pending L hip arthroplasty on 3/6, NPO, IVF  - Pending pre-op clearance, patient is ambulatory without a walker at baseline and is able to walk for an hour without feeling short of breath, her outpatient EKG shows sinus bradycardia with marked sinus arrhythmia and anteroseptal infarct, age indeterminant.  - EKG on 3/5 was not good in quality, pending repeat EKG  - F/u TTE and EKG for preop clearance  - Pain management: Dilaudid IV 1 mg q4h PRN for severe pain, oxycodone 5 mg q6h PRN for moderate pain, tylenol 650 mg q6h prn mild pain  - Bowel reg: Senna and miralax    #Heart murmur at left lower sternal border  - Sounds holosystolic but there is concern that there may be aortic or pulmonic valve stenosis  - Pending TTE for preop clearance    #HTN  - c/w home amlodipine 10 mg daily and HCTZ 12.5 mg daily    #HLD  - c/w home lipitor 40 mg nightly    #Dementia  Patient reports worsening forgetfulness lately  - Patient reports she has an appointment with a neurologist outpatient  - c/w home donepezil 10 mg daily    #MISC  - DVT PPx: On SCDs, gave one dose of heparin SQ, hold DVT PPx for procedure  - GI PPx: Not indicated  - Diet: NPO for procedure  - Activity: Bedrest Patient is an 89 yo F w/PMH of HTN, HLD, and dementia who presented to the ED with L hip pain and deformity after tripping on the stairs, falling to her left side, and being unable to get on her own now admitted to medicine for management of L femoral hip fracture pending L hip hemiarthroplasty by orthopedic surgery.    #Mechanical fall, no syncope  #L femoral hip fracture  *Pelvic XR (3/5): LEFT femoral neck fracture with mild superior displacement and impaction. No right hip fracture.  *L femur XR (3/5): Acute femoral neck fracture with slight superior dislocation and impaction. No shaft fracture.  *CXR (3/5): No focal consolidation, pneumothorax or pleural effusion. Unremarkable cardiomediastinal silhouette. No radiographically evident acute displaced fracture within the limitations of this exam.  - Orthopedic surgery recs (3/5): plan for Left hip hemiarthroplasty 3/6/2024 pending medical clearance and OR availabilty, Bed rest, HOLD DVT PPx, Adult Medicine pre-op risk stratification / medical optimization, 2u RBC on hold for surgery, NPO for surgery today 3/6  - Pending L hip arthroplasty on 3/6, NPO, IVF  - Pending pre-op clearance, patient is ambulatory without a walker at baseline and is able to walk for an hour without feeling short of breath, her outpatient EKG shows sinus bradycardia with marked sinus arrhythmia and anteroseptal infarct, age indeterminant.  - EKG on 3/5 was not good in quality, pending repeat EKG  - F/u TTE and EKG for preop clearance  - Pain management: Dilaudid IV 1 mg q4h PRN for severe pain, oxycodone 5 mg q6h PRN for moderate pain, tylenol 650 mg q6h prn mild pain  - Bowel reg: Senna and miralax    #Heart murmur at left lower sternal border  - Sounds holosystolic but there is concern that there may be aortic or pulmonic valve stenosis  - Pending TTE for preop clearance    #HTN  - c/w home amlodipine 10 mg daily and HCTZ 12.5 mg daily    #HLD  - c/w home lipitor 40 mg nightly    #Dementia  Patient reports worsening forgetfulness lately  - Patient reports she has an appointment with a neurologist outpatient  - c/w home donepezil 10 mg daily    #MISC  - DVT PPx: On SCDs, gave one dose of heparin SQ, hold DVT PPx for procedure  - GI PPx: Not indicated  - Diet: NPO for procedure  - Activity: Bedrest Patient is an 87 yo F w/PMH of HTN, HLD, and dementia who presented to the ED with L hip pain and deformity after tripping on the stairs, falling to her left side, and being unable to get on her own now admitted to medicine for management of L femoral hip fracture pending L hip hemiarthroplasty by orthopedic surgery.    #Mechanical fall, no syncope  #L femoral hip fracture  *Pelvic XR (3/5): LEFT femoral neck fracture with mild superior displacement and impaction. No right hip fracture.  *L femur XR (3/5): Acute femoral neck fracture with slight superior dislocation and impaction. No shaft fracture.  *CXR (3/5): No focal consolidation, pneumothorax or pleural effusion. Unremarkable cardiomediastinal silhouette. No radiographically evident acute displaced fracture within the limitations of this exam.  - Orthopedic surgery recs (3/5): plan for Left hip hemiarthroplasty 3/6/2024 pending medical clearance and OR availabilty, Bed rest, HOLD DVT PPx, Adult Medicine pre-op risk stratification / medical optimization, 2u RBC on hold for surgery, NPO for surgery today 3/6  - Pending L hip arthroplasty on 3/6, NPO, IVF  - Pending pre-op clearance, patient is ambulatory without a walker at baseline and is able to walk for an hour without feeling short of breath, her outpatient EKG shows sinus bradycardia with marked sinus arrhythmia and anteroseptal infarct, age indeterminant.  - EKG on 3/5 was not good in quality, pending repeat EKG  - F/u TTE and EKG for preop clearance  - Pain management: Dilaudid IV 1 mg q4h PRN for severe pain, oxycodone 5 mg q6h PRN for moderate pain, tylenol 650 mg q6h prn mild pain  - Bowel reg: Senna and miralax    #Heart murmur at left lower sternal border  - Sounds holosystolic but there is concern that there may be aortic or pulmonic valve stenosis  - Patient's daughter reports patient has not had an echocardiogram done in recent years  - , Trop [facility] on [facility]  - Pending BNP and trop  - Pending TTE for preop clearance    #HTN  - c/w home amlodipine 10 mg daily and HCTZ 12.5 mg daily    #HLD  - c/w home lipitor 40 mg nightly    #Dementia  Patient reports worsening forgetfulness lately  - Patient reports she has an appointment with a neurologist outpatient  - c/w home donepezil 10 mg daily    #MISC  - DVT PPx: On SCDs, gave one dose of heparin SQ, hold DVT PPx for procedure  - GI PPx: Not indicated  - Diet: NPO for procedure  - Activity: Bedrest

## 2024-03-06 NOTE — PROGRESS NOTE ADULT - SUBJECTIVE AND OBJECTIVE BOX
ORTHOPEDIC SURGERY PRE OP NOTE    Diagnosis: LEFT femoral neck fracture    Planned Procedure: left hip jose arthroplasty    Consent: TO BE OBTAINED BY ATTENDING                   Risks/benefits/alternatives were discussed with the patient/family and they wish to proceed with surgery.     ANTICIPATED DATE OF PROCEDURE:   SCHEDULED CASE OR ADD-ON CASE:     Clearances:   [x] Medicine:       T(C): 36.8 (03-06-24 @ 02:59), Max: 36.8 (03-06-24 @ 02:59)  HR: 80 (03-06-24 @ 02:59) (80 - 104)  BP: 146/52 (03-06-24 @ 02:59) (146/52 - 176/72)  RR: 18 (03-06-24 @ 02:59) (18 - 18)  SpO2: 97% (03-06-24 @ 02:59) (97% - 99%)    Labs:                        14.2   10.40 )-----------( 249      ( 05 Mar 2024 20:11 )             42.3     03-05    137  |  99  |  22<H>  ----------------------------<  171<H>  3.5   |  28  |  1.0    Ca    8.9      05 Mar 2024 20:11    TPro  6.9  /  Alb  4.1  /  TBili  0.2  /  DBili  x   /  AST  14  /  ALT  17  /  AlkPhos  87  03-05    PT/INR - ( 05 Mar 2024 20:11 )   PT: 11.80 sec;   INR: 1.03 ratio         PTT - ( 05 Mar 2024 20:11 )  PTT:28.2 sec  [ ]Type and Screen X 2:  [ ]Pregnancy test ( if female of childbearing age) :   [ ]EKG:   [ ]CXR:     DIET: NPO after midnight  IVF: per primary team    ANTICOAGULATION STATUS ( include name of anticoagulant) :  [x] CONTINUE prophylactic hsq             A/P: Patient is a 88y y/o Female Pending left hip hemiarthroplasty    [ ] NPO and IVF @ midnight  [ ] pain control/analgesia prn per primary team   [ ] Incentive Spirometry   [ ] F/U Clearance  [ ] F/U Pending Labs  [ ] Notify Ortho with any questions- spectra [id number]    [ ]DISCUSSED WITH PRIMARY TEAM MEMBER (name of team member): medicine  [ ]Date and Time DISCUSSED WITH PRIMARY TEAM MEMBER: 5am 3/6

## 2024-03-06 NOTE — H&P ADULT - ATTENDING COMMENTS
My note supersedes all residents notes that I sign, My correction for their notes are in my notes   the patient seen and discussed with the morning team   the patient daughter and granddaughter at bedside   On exam  General: awake, alert, NAD, chronic ill appearance  Lungs:  clear to ausculation b/l, normal resp effort  Heart: regular ryhthm + Murmur   Abdomen: soft, non tender non distended  Ext: no edema, can move all  his extremities excpet left LE short and ext rotate deformity , tender to touch       89 yo F w/PMH of HTN, HLD, and dementia who presented to the ED with L hip pain and deformity after tripping on the stairs, falling to her left side, and being unable to get on her own now admitted to medicine for management of L femoral hip fracture pending L hip hemiarthroplasty by orthopedic surgery.    []Mechanical fall, no syncope  []L femoral hip fracture  imaging and labd reviwed .  - Orthopedic surgery recs (3/5): plan for Left hip hemiarthroplasty 3/6/2024 pending medical clearance and OR availabilty, Bed rest, HOLD DVT PPx, Adult Medicine pre-op risk stratification / medical optimization, 2u RBC on hold for surgery, NPO for surgery today 3/6  - Pending L hip arthroplasty today would keep her  NPO, IVF  get cardiology for risk stratifications and eval  given possible ST  depression v4, v5, get troponin and echo for the murmur as well and as per the daughter she had WEST recently as well   pain management and bowel regimen   - Bowel reg: Senna and miralax    []HTN  - c/w home amlodipine 10 mg daily and  hold HCTZ 12.5 mg daily for now given NPO     []HLD  - c/w home lipitor 40 mg nightly    []Dementia  Patient reports worsening forgetfulness lately  - Patient reports she has an appointment with a neurologist outpatient  - c/w home donepezil 10 mg daily    [] dvt ppx heparin sq

## 2024-03-06 NOTE — H&P ADULT - NSHPLABSRESULTS_GEN_ALL_CORE
----------Daily Progress Note----------    <<<<<LABS>>>>>                        14.2   10.40 )-----------( 249      ( 05 Mar 2024 20:11 )             42.3     03-05    137  |  99  |  22<H>  ----------------------------<  171<H>  3.5   |  28  |  1.0    Ca    8.9      05 Mar 2024 20:11    TPro  6.9  /  Alb  4.1  /  TBili  0.2  /  DBili  x   /  AST  14  /  ALT  17  /  AlkPhos  87  03-05    PT/INR - ( 05 Mar 2024 20:11 )   PT: 11.80 sec;   INR: 1.03 ratio         PTT - ( 05 Mar 2024 20:11 )  PTT:28.2 sec  Urinalysis Basic - ( 05 Mar 2024 20:11 )    Color: x / Appearance: x / SG: x / pH: x  Gluc: 171 mg/dL / Ketone: x  / Bili: x / Urobili: x   Blood: x / Protein: x / Nitrite: x   Leuk Esterase: x / RBC: x / WBC x   Sq Epi: x / Non Sq Epi: x / Bacteria: x          [id number]        ----------------------------------------------------------------------------------------------------------------------------------------------------------------------------------------------- <<<<<LABS>>>>>                        14.2   10.40 )-----------( 249      ( 05 Mar 2024 20:11 )             42.3     03-05    137  |  99  |  22<H>  ----------------------------<  171<H>  3.5   |  28  |  1.0    Ca    8.9      05 Mar 2024 20:11    TPro  6.9  /  Alb  4.1  /  TBili  0.2  /  DBili  x   /  AST  14  /  ALT  17  /  AlkPhos  87  03-05    PT/INR - ( 05 Mar 2024 20:11 )   PT: 11.80 sec;   INR: 1.03 ratio         PTT - ( 05 Mar 2024 20:11 )  PTT:28.2 sec  Urinalysis Basic - ( 05 Mar 2024 20:11 )    Color: x / Appearance: x / SG: x / pH: x  Gluc: 171 mg/dL / Ketone: x  / Bili: x / Urobili: x   Blood: x / Protein: x / Nitrite: x   Leuk Esterase: x / RBC: x / WBC x   Sq Epi: x / Non Sq Epi: x / Bacteria: x          [id number]    <<<<<RADIOLOGY>>>>>  Pelvic XR (3/5): LEFT femoral neck fracture with mild superior displacement and impaction. No right hip fracture.    L femur XR (3/5): Acute femoral neck fracture with slight superior dislocation and impaction. No shaft fracture.    CXR (3/5): No focal consolidation, pneumothorax or pleural effusion. Unremarkable cardiomediastinal silhouette. No radiographically evident acute displaced fracture within the limitations of this exam.    CT Head (3/5): No acute intracranial findings.    CT C-spine (3/5): No acute cervical fracture or facet subluxation.    -----------------------------------------------------------------------------------------------------------------------------------------------------------------------------------------------

## 2024-03-06 NOTE — PATIENT PROFILE ADULT - FALL HARM RISK - HARM RISK INTERVENTIONS

## 2024-03-06 NOTE — PRE-ANESTHESIA EVALUATION ADULT - NSRADCARDRESULTSFT_GEN_ALL_CORE
Summary:   1. Technically difficult study.   2. Hyperdynamic global left ventricular systolic function.   3. LV Ejection Fraction by Mejia's Method with a biplane EF of 75 %.   4. Spectral Doppler shows pseudonormal pattern of left ventricular   myocardial filling (Grade II diastolic dysfunction).   5. Mildly enlarged left atrium.   6. Normal right atrial size.   7. Degenerative mitral valve.   8. Moderate thickening and calcification of the anterior and posterior   mitral valve leaflets.   9. Severe mitral annular calcification.  10. Trace mitral valve regurgitation.  11. Mild tricuspid regurgitation.  12. Moderate to severe aortic valve stenosis. Peak/mean PG 71/40 mmHg,   DONTE 1.1 cm^2.  13. Estimated pulmonary artery systolic pressure is 44.5 mmHg assuming a   right atrial pressure of 3 mmHg, which is consistent with mild pulmonary   hypertension.  14. Endocardial visualization was enhanced with intravenous echo contrast.

## 2024-03-06 NOTE — CONSULT NOTE ADULT - ASSESSMENT
Impression   # Left hip fracture   # Moderate to severe AS on TTE - Asympatomatic   Normal EF   Non ischemic ECG     No angina or ACS   No evidence of unstable arrhythmia   No evidence of significant valvular disease   No ADHF    RCRI 0 -> 3.9% risk of perioperative MI   METS > 4      Recommendations   - She is currently optimized for procedure   - Recommend starting aspirin and low dose beta blocker   - No further cardiac workup   - She will need to follow up with cardiology as outpatient for the AS   - She is at moderate risk for perioperative MACE   - This consult serves only as a martinez-operative cardiac risk stratification and evaluation to predict 30-days cardiac complications risk and mortality. The decision to proceed with the surgery/procedure is made by the performing physician and the patient -    Discussed with attending.   Signature: Liset FLOWER, 2nd year cardiovascular diseases fellow       Impression   # Left hip fracture   # Moderate to severe AS on TTE - Asymptomatic, functional at baseline, able to lye flat   Normal EF   Non ischemic ECG     No angina or ACS   No evidence of unstable arrhythmia   No evidence of significant valvular disease   No ADHF    RCRI 0   METS > 4      Recommendations   - She is currently stable  fo  - Recommend starting aspirin and low dose beta blocker   - No further cardiac workup prior to intervention   - She will need to follow up with cardiology as outpatient for the AS   - She is at moderate risk for perioperative MACE   - This consult serves only as a martinez-operative cardiac risk stratification and evaluation to predict 30-days cardiac complications risk and mortality. The decision to proceed with the surgery/procedure is made by the performing physician and the patient -

## 2024-03-06 NOTE — PROGRESS NOTE ADULT - SUBJECTIVE AND OBJECTIVE BOX
ORTHOPEDIC POST-OP CHECK    Subjective: POD0 s/p L hip hemiarthroplasty. Seen and examined at bedside. Doing well, pain controlled. Denies fevers, numbness/tingling. No other complaints.    MEDICATIONS  (STANDING):  amLODIPine   Tablet 10 milliGRAM(s) Oral daily  atorvastatin 40 milliGRAM(s) Oral at bedtime  ceFAZolin   IVPB 2000 milliGRAM(s) IV Intermittent every 8 hours  donepezil 10 milliGRAM(s) Oral at bedtime  hydrochlorothiazide 12.5 milliGRAM(s) Oral daily  polyethylene glycol 3350 17 Gram(s) Oral two times a day  senna 2 Tablet(s) Oral at bedtime  sodium chloride 0.9%. 1000 milliLiter(s) (75 mL/Hr) IV Continuous <Continuous>    MEDICATIONS  (PRN):  acetaminophen     Tablet .. 650 milliGRAM(s) Oral every 6 hours PRN Mild Pain (1 - 3)  HYDROmorphone  Injectable 1 milliGRAM(s) IV Push every 4 hours PRN Severe Pain (7 - 10)  oxycodone    5 mG/acetaminophen 325 mG 1 Tablet(s) Oral every 6 hours PRN Moderate Pain (4 - 6)      Objective:  T(C): 37.4 (03-06-24 @ 20:05), Max: 37.4 (03-06-24 @ 20:05)  HR: 71 (03-06-24 @ 21:35) (61 - 96)  BP: 107/59 (03-06-24 @ 21:35) (86/47 - 166/74)  RR: 20 (03-06-24 @ 21:35) (17 - 20)  SpO2: 97% (03-06-24 @ 21:35) (93% - 97%)    Physical Exam:      LLE:  Dressing c/d/i  SILT s/s/sp/dp/t  Motor intact TA/EHL/GS  Digits wwp    Labs:                        14.2   10.40 )-----------( 249      ( 05 Mar 2024 20:11 )             42.3     03-05    137  |  99  |  22<H>  ----------------------------<  171<H>  3.5   |  28  |  1.0    Ca    8.9      05 Mar 2024 20:11    TPro  6.9  /  Alb  4.1  /  TBili  0.2  /  DBili  x   /  AST  14  /  ALT  17  /  AlkPhos  87  03-05      A/P: 88yFemale with L hip femoral neck fracture, s/p L hip hemiarthroplasty on 3/6/24. Stable post operatively.     - Activity: WBAT LLE  - Abx: Ancef q8hr for a total of 24hrs post-operatively  - DVT PPx: LVX/SQH per primary team. Resume on POD1  - Pain control  - IS encouraged  - AM labs  - PT/Rehab  - D/C planning    - If discharged, patient should follow up with Dr. Sebastian at [address] [place] [address]. Phone number [phone] for scheduling/appointment.  - Patient should be discharged on 6 weeks (from surgery date) of appropriate DVT prophylaxis due to their decreased functional/ambulation status after lower extremity surgery. Orthopaedic preference would be Aspirin 81 mg BID  if there are no contraindications. If patient is already on home anticoagulation, they may continue home anticoagulation medication instead of aspirin. If patient is going to inpatient rehab/nursing facility, and they plan for DVT PPx with SQH/LVX, they may be placed on that instead of aspirin.

## 2024-03-06 NOTE — PATIENT PROFILE ADULT - FUNCTIONAL ASSESSMENT - BASIC MOBILITY 1.
Patient is alert and oriented x 4, vitals stable, on tele # 8, HR 55-63. Reports of chronic headache, PRN Norco 10 mg administered per MAR. Relief provided. Denies chest pain, dizziness or SOB. Up independent in room. Will continue to monitor.  Sheri Preston RN on 7/17/2021 at 8:42 AM    1 = Total assistance

## 2024-03-06 NOTE — CONSULT NOTE ADULT - ATTENDING COMMENTS
Orthopedic Trauma Attending  Patient seen and examined.  PMHx, Psurg Hx, Medications and Allergies reviewed.  X-rays and CT reviewed.  Agree with findings, assessment and plan.  Impression:  Displaced femoral neck fracture in an octogenarian.  Recommend hemiarthroplasty ASAP.  Have spoken with cardiology and medicine.  New Dx murmur.  Cardiac w/u ongoing.  Echo completed.  Awaiting final read and recommendations.  Awaiting OR availability.      Risks, benefits and alternative to surgical management of the patient's fracture were again reviewed with patient, her questions answered to her satisfaction and she wishes to proceed with proposed surgery ASAP.
Patient seen and examined at bedside  89 yo F w/PMH of HTN, HLD, and dementia who presented to the ED with L hip pain , diagnosed with hip fracture post mechanical fall   At baseline she is functional and active elderly patient.   echo showed normal EF moderate to severe AS. currently well compensating, no evidence of heart failure, able to lye flat.   EKG sinus rhythm no acute ischemic changes  Patient is at moderate to elevated cardiac risk for moderate risk Sx.  currently stable from cardiac standpoint, no need for further workup prior to procedure.   can proceed with planed intervention   will recommend  EKG post op   try to keep patient euvolemic  low dose metoprolol and baby asa   monitor kidney function and electrolytes  follow up as outpatient with cardio for further management of AS.

## 2024-03-06 NOTE — PRE-ANESTHESIA EVALUATION ADULT - NSANTHDISPORD_GEN_ALL_CORE
Anesthesia POST Procedure Evaluation    Patient: Shine Chua   MRN:     [id number] Gender:   female   Age:    6 year old :      2012        Preoperative Diagnosis: Congenital scoliosis, hydronephrosis   Procedure(s):  Xray voiding cystogram  CT Thoracic and Lumbar spine  3T MRI Complete spine   Postop Comments: No value filed.       Anesthesia Type:  General    Reportable Event: NO     PAIN: Uncomplicated   Sign Out status: Comfortable, Well controlled pain     PONV: No PONV   Sign Out status:  No Nausea or Vomiting     Neuro/Psych: Uneventful perioperative course   Sign Out Status: Preoperative baseline; Age appropriate mentation     Airway/Resp.: Uneventful perioperative course   Sign Out Status: Non labored breathing, age appropriate RR; Resp. Status within EXPECTED Parameters     CV: Uneventful perioperative course   Sign Out status: Appropriate BP and perfusion indices; Appropriate HR/Rhythm     Disposition:   Sign Out in:  PACU  Disposition:  Phase II; Home  Recovery Course: Uneventful  Follow-Up: Not required     Comments/Narrative:  Uneventful, ready for discharge           Last Anesthesia Record Vitals:  [facility] VITALS  2019 1217 - 2019 1317      2019             NIBP:  98/62    NIBP Mean:  85    Ht Rate:  74    Temp:  36.2  C (97.2  F)    SpO2:  100 %    EKG:  Sinus rhythm          Last PACU/Preop Vitals:  Vitals:    19 1255 19 1300 19 1315   BP:  107/43 110/44   Pulse:  71 67   Resp:  20 19   Temp: 36.4  C (97.5  F)  36.3  C (97.4  F)   SpO2:  98% 99%         Electronically Signed By: Smooth Kent MD, 2019, 1:42 PM  
PACU

## 2024-03-06 NOTE — BRIEF OPERATIVE NOTE - OPERATION/FINDINGS
Left femoral neck fracture, displaced. Cemented Hemiarthroplasty performed. Left femoral neck fracture, displaced. Cemented Hemiarthroplasty through anteriolateral approach.  Post op WBAT with anterolateral hip precautions (eg. no SLR in supine position); Abx and DVT ppx per protocol  Dict:  Implants: Depuy [place] size 5 femoral stem with +5/28/43 mm head; Simplex PMMA without abx Left femoral neck fracture, displaced. Cemented Hemiarthroplasty through anteriolateral approach.  Post op WBAT with anterolateral hip precautions (eg. no SLR in supine position); Abx and DVT ppx per protocol  Dict:  Implants: Depuy [place] size 5 femoral stem with +5/28/46 mm head; Simplex PMMA without abx Left femoral neck fracture, displaced. Cemented Hemiarthroplasty through anteriolateral approach.  Post op WBAT with anterolateral hip precautions (eg. no SLR in supine position); Abx and DVT ppx per protocol  Dict:[id number]  Implants: Depuy [place] size 5 standard offset femoral stem with +5/28/46 mm head; Simplex PMMA without abx

## 2024-03-06 NOTE — H&P ADULT - NSHPPHYSICALEXAM_GEN_ALL_CORE
<<<<<PHYSICAL EXAM>>>>>  GENERAL: Well developed, well nourished and in no acute distress. Resting comfortably in bed.  HEENT: Normocephalic, atraumatic, mucous membranes moist, EOMI, PERRLA, bilateral sclera anicteric, no conjunctival injection  Neck: Supple, non-tender, no lymphadenopathy.  PULMONARY: Clear to auscultation bilaterally. No rales, rhonchi, or wheezing.  CARDIOVASCULAR: Regular rate and rhythm, S1-S2, no murmurs  GASTROINTESTINAL: Soft, non-tender, non-distended, no guarding.  RENAL: No CVA tenderness.  SKIN/EXTREMITIES: No clubbing or edema  NEUROLOGIC/MUSCULOSKELETAL: AOx4, grossly moving all extremities, no focal deficits. GENERAL: Elderly woman laying in bed in no apparent distress, very talkative and often goes on tangents  HEENT: Normocephalic, atraumatic, mucous membranes moist, EOMI, PERRLA, bilateral sclera anicteric, no conjunctival injection  NECK: Supple, non-tender, no lymphadenopathy  PULMONARY: Clear to auscultation bilaterally. No wheezing or crackles  CARDIOVASCULAR: Regular rate and rhythm, S1-S2, 2+ holosystolic murmur loudest at the LLSB  GASTROINTESTINAL: Soft, non-tender, non-distended, no guarding  SKIN: No rashes, erythema, or open wounds  EXTREMITIES: Warm, 2+ tibialis posterior pulses, no UE or LE edema  NEUROLOGIC/MUSCULOSKELETAL: Alerted and oriented to person and place but not time, calculation intact, concentration intact, long term memory intact, grossly moving upper extremities, able to lift and bend right leg, unable to lift left leg, severe pain when attempting to move left leg, left leg with fracture deformity localized to hip

## 2024-03-06 NOTE — H&P ADULT - NSHPREVIEWOFSYSTEMS_GEN_ALL_CORE
REVIEW OF SYSTEMS:    CONSTITUTIONAL: No weakness, fevers or chills  EYES/ENT: No visual changes;  No vertigo or throat pain   NECK: No pain or stiffness  RESPIRATORY: No cough, wheezing, hemoptysis; No shortness of breath  CARDIOVASCULAR: No chest pain or palpitations  GASTROINTESTINAL: No abdominal or epigastric pain. No nausea, vomiting, or hematemesis; No diarrhea or constipation. No melena or hematochezia.  GENITOURINARY: No dysuria, frequency or hematuria  NEUROLOGICAL: No numbness or weakness  SKIN: No itching, rashes CONSTITUTIONAL: No weakness, fevers or chills  EYES/ENT: No visual changes;  No vertigo or throat pain   NECK: No pain or stiffness  RESPIRATORY: No cough, wheezing, hemoptysis; No shortness of breath  CARDIOVASCULAR: No chest pain or palpitations  GASTROINTESTINAL: No abdominal or epigastric pain. No nausea, vomiting, or hematemesis; No diarrhea or constipation. No melena or hematochezia.  GENITOURINARY: No dysuria, frequency or hematuria  NEUROLOGICAL: No numbness or weakness  SKIN: No itching, rashes  ENDO:   HEME:   PSYCH: CONSTITUTIONAL: No weakness, fevers or chills  EYES/ENT: No visual changes or hearing changes;  No vertigo or throat pain   NECK: No pain or stiffness  RESPIRATORY: No cough, wheezing, hemoptysis; No shortness of breath  CARDIOVASCULAR: No chest pain or palpitations  GASTROINTESTINAL: No abdominal or epigastric pain. No nausea, vomiting, or hematemesis; No diarrhea or constipation. No melena or hematochezia.  GENITOURINARY: No dysuria, frequency or hematuria  NEUROLOGICAL: No numbness or weakness  SKIN: No itching, rashes  ENDO: No heat or cold intolerance, no polyuria or polydipsia  HEME: No abnormal bleeding or bruising  PSYCH: No anxiety or depression

## 2024-03-06 NOTE — PROGRESS NOTE ADULT - SUBJECTIVE AND OBJECTIVE BOX
T H I S   I S    N O  T   A    F I N A L I Z E D   N O T SELINA JACOB, CAT  88y, Female  Allergy: No Known Allergies    Hospital Day: 1d    Patient seen and examined earlier today.     PMH/PSH:  PAST MEDICAL & SURGICAL HISTORY:  HTN (hypertension)      Hypercholesteremia      Heavy cigarette smoker      History of laparoscopic cholecystectomy      H/O: hysterectomy      History of surgery  LEFT EYE CATARACT EXTRACTION WITH LENS  IMPLANT          LAST 24-Hr EVENTS:    VITALS:  T(F): 98.4 (03-06-24 @ 08:37), Max: 98.4 (03-06-24 @ 08:37)  HR: 83 (03-06-24 @ 08:37)  BP: 143/66 (03-06-24 @ 08:37) (143/66 - 176/72)  RR: 18 (03-06-24 @ 08:37)  SpO2: 97% (03-06-24 @ 02:59)          TESTS & MEASUREMENTS:  Weight/BMI                            14.2   10.40 )-----------( 249      ( 05 Mar 2024 20:11 )             42.3     PT/INR - ( 05 Mar 2024 20:11 )   PT: 11.80 sec;   INR: 1.03 ratio         PTT - ( 05 Mar 2024 20:11 )  PTT:28.2 sec  INR: 1.03 ratio (03-05-24 @ 20:11)    03-05    137  |  99  |  22<H>  ----------------------------<  171<H>  3.5   |  28  |  1.0    Ca    8.9      05 Mar 2024 20:11    TPro  6.9  /  Alb  4.1  /  TBili  0.2  /  DBili  x   /  AST  14  /  ALT  17  /  AlkPhos  87  03-05    LIVER FUNCTIONS - ( 05 Mar 2024 20:11 )  Alb: 4.1 g/dL / Pro: 6.9 g/dL / ALK PHOS: 87 U/L / ALT: 17 U/L / AST: 14 U/L / GGT: x                 Urinalysis Basic - ( 05 Mar 2024 20:11 )    Color: x / Appearance: x / SG: x / pH: x  Gluc: 171 mg/dL / Ketone: x  / Bili: x / Urobili: x   Blood: x / Protein: x / Nitrite: x   Leuk Esterase: x / RBC: x / WBC x   Sq Epi: x / Non Sq Epi: x / Bacteria: x                            RADIOLOGY, ECG, & ADDITIONAL TESTS:  12 Lead ECG:   Ventricular Rate 85 BPM    Atrial Rate 85 BPM    P-R Interval 202 ms    QRS Duration 70 ms    Q-T Interval 382 ms    QTC Calculation(Bazett) 454 ms    P Axis 78 degrees    R Axis 48 degrees    T Axis 13 degrees    Diagnosis Line Sinus rhythm with Premature supraventricular complexes and with occasional   Premature ventricular complexes  Septal infarct , age undetermined  Abnormal ECG    Confirmed by AMBROCIO MCKEE MD ([id number]) on 3/6/2024 6:42:28 AM (03-05-24 @ 23:44)    CT Head No Cont:   ACC: [id number] EXAM:  CT CERVICAL SPINE   ORDERED BY: CHATO MCNEILL     ACC: [id number] EXAM:  CT BRAIN   ORDERED BY: CHATO MCNEILL     PROCEDURE DATE:  03/05/2024          INTERPRETATION:  CLINICAL INDICATION: Unwitnessed fall.    TECHNIQUE: CTof the head and cervical spine was performed without the   administration of intravenous contrast.    COMPARISON: None available.    FINDINGS:    CT HEAD:    There is prominence of the sulci, sylvian fissures, and ventricles,   reflecting mild diffuse parenchymal volume loss.    No evidence of acute territorial infarct, intracranial hemorrhage or mass   lesion.    No hydrocephalus. There are no extra-axial fluid collections.    Status post bilateral cataract surgery. Mild mucosal thickening the left   maxillary sinus. The mastoid air cells are clear. The visualized soft   tissues and osseous structures appear normal.      CT CERVICAL SPINE:    Mild anterolisthesis of C3 on C4.    There is no evidence of acute fracture, compression deformity or facet   subluxation of the cervical spine.    The atlantoaxial relationships are maintained. The posterior elements are   intact. There is no significant prevertebral soft tissue swelling. The   visualized paraspinal soft tissues are unremarkable.    Mild multilevel endplate degenerative changes as evidenced by marginal   osteophyte formation, uncovertebral spurring, loss of normal disc space   height as well as facet joint space compartment narrowing.    Central disc protrusion osteophyte complex at C4-5 contributing to at   least moderate spinal stenosis and severe right foraminal stenosis..   Please note spinal canal contents are suboptimally evaluated inherent to   CT technique.    The visualized lung apices are within normal limits.      IMPRESSION:    CT HEAD:  No acute intracranial findings.    CT CERVICAL SPINE:  No acute cervical fracture or facet subluxation.    --- End of Report ---            HARRIS VIVAS MD; Attending Radiologist  This document has been electronically signed. Mar  5 2024 10:45PM (03-05-24 @ 22:16)    RECENT DIAGNOSTIC ORDERS:  12 Lead ECG (03-06-24 @ 07:18)  Packed Red Cells Order:  1 Unit  Indication: Other: OR-L hip arthroplasty  Infuse Unit : 2 Hours --- HOLD for OR-L hip arthroplasty (03-06-24 @ 04:27)  Packed Red Cells Order:  1 Unit  Indication: Other: OR-L hip arthroplasty  Infuse Unit : 2 Hours --- HOLD for OR-L hip arthroplasty (03-06-24 @ 04:27)  Diet, NPO:   Except Medications (03-06-24 @ 03:00)  Wet Read: Routine  WET READ: MSK XR negative - No fracture, No dislocation, No foreign body (03-05-24 @ 23:35)  Wet Read: Routine  WET READ: L femoral neck fx (03-05-24 @ 23:35)  Wet Read: Routine  WET READ: CXR negative - No pneumothorax, No opacities, No free air (03-05-24 @ 23:35)  Wet Read: Routine  WET READ: L femoral neck fx (03-05-24 @ 23:35)  ABO Rh Confirmatory Specimen: STAT  Addl Info: Conditional: ABO Rh Confirmatory Specimen (03-05-24 @ 20:03)      MEDICATIONS:  MEDICATIONS  (STANDING):  amLODIPine   Tablet 10 milliGRAM(s) Oral daily  atorvastatin 40 milliGRAM(s) Oral at bedtime  donepezil 10 milliGRAM(s) Oral at bedtime  hydrochlorothiazide 12.5 milliGRAM(s) Oral daily  polyethylene glycol 3350 17 Gram(s) Oral two times a day  senna 2 Tablet(s) Oral at bedtime  sodium chloride 0.9%. 1000 milliLiter(s) (75 mL/Hr) IV Continuous <Continuous>    MEDICATIONS  (PRN):  acetaminophen     Tablet .. 650 milliGRAM(s) Oral every 6 hours PRN Mild Pain (1 - 3)  HYDROmorphone  Injectable 1 milliGRAM(s) IV Push every 4 hours PRN Severe Pain (7 - 10)  oxycodone    5 mG/acetaminophen 325 mG 1 Tablet(s) Oral every 6 hours PRN Moderate Pain (4 - 6)      HOME MEDICATIONS:  amLODIPine 10 mg oral tablet (03-06)  donepezil 10 mg oral tablet (03-06)  hydroCHLOROthiazide 12.5 mg oral capsule (05-27)  Lipitor 40 mg oral tablet (05-27)      PHYSICAL EXAM:  GENERAL:   CHEST/LUNG:   HEART:   ABDOMEN:   EXTREMITIES:

## 2024-03-06 NOTE — PRE-ANESTHESIA EVALUATION ADULT - NSANTHADDINFOFT_GEN_ALL_CORE
GA planned; Risks discussed including dental injury and more serious complications including cardiac and pulmonary complications and stroke.  Patient expresses understanding with regard to risks of anesthesia and wishes to proceed.    art line

## 2024-03-06 NOTE — PRE-ANESTHESIA EVALUATION ADULT - NSANTHPMHFT_GEN_ALL_CORE
# Left hip fracture   # Moderate to severe AS on TTE - Asymptomatic, functional at baseline, able to lye flat   Normal EF   Non ischemic ECG     No angina or ACS   No evidence of unstable arrhythmia   No evidence of significant valvular disease   No ADHF    RCRI 0   METS > 4      Recommendations   - She is currently stable  fo  - Recommend starting aspirin and low dose beta blocker   - No further cardiac workup prior to intervention   - She will need to follow up with cardiology as outpatient for the AS   - She is at moderate risk for perioperative MACE   - This consult serves only as a martinez-operative cardiac risk stratification and evaluation to predict 30-days cardiac complications risk and mortality. The decision to proceed with the surgery/procedure is made by the performing physician and the patient -                Attestation Statements:   Attestation Statements:  I have personally seen and examined the patient.  I fully participated in the care of this patient.  I have made amendments to the documentation where necessary, and agree with the history, physical exam, and plan as documented by the Resident and Fellow.     Patient seen and examined at bedside  87 yo F w/PMH of HTN, HLD, and dementia who presented to the ED with L hip pain , diagnosed with hip fracture post mechanical fall   At baseline she is functional and active elderly patient.   echo showed normal EF moderate to severe AS. currently well compensating, no evidence of heart failure, able to lye flat.   EKG sinus rhythm no acute ischemic changes  Patient is at moderate to elevated cardiac risk for moderate risk Sx.  currently stable from cardiac standpoint, no need for further workup prior to procedure.   can proceed with planed intervention   will recommend  EKG post op   try to keep patient euvolemic  low dose metoprolol and baby asa   monitor kidney function and electrolytes  follow up as outpatient with cardio for further management of AS.

## 2024-03-06 NOTE — BRIEF OPERATIVE NOTE - NSICDXBRIEFPROCEDURE_GEN_ALL_CORE_FT
PROCEDURES:  Open treatment of fracture of femoral neck with prosthesis 06-Mar-2024 20:21:25 Left femoral neck fracture Abdoulaye Chang   PROCEDURES:  Open treatment of fracture of femoral neck with prosthesis 06-Mar-2024 20:21:25 CPT code 86958 Abdoulaye Chang

## 2024-03-06 NOTE — H&P ADULT - HISTORY OF PRESENT ILLNESS
Patient is an 89 yo F w/PMH of HTN, HLD, and dementia who presented to the ED with L hip pain and deformity after tripping on the stairs and falling to her left side and being unable to get 88-year-old female with a past medical history of hypertension, hyperlipidemia, dementia presents to the ED for evaluation of left hip pain and deformity status post mechanical fall after running up the stairs this evening.  Patient reports the stairs to answer the telephone when she got to the top of the steps she tripped and fell landing on her left side.  Patient reports she was unable to get up on her own and she was found sitting on the floor with her back up against the wall by a family member who lives above her.  Patient was recently admitted at [facility] for disorientation.  Patient denies headaches, LOC, head trauma, use of blood thinners, neck pain, back pain, chest pain, shortness of breath, abdominal pain, nausea, vomiting, diarrhea, constipation, urinary symptoms, weakness, numbness, or tingling. Patient is an 89 yo F w/PMH of HTN, HLD, and dementia who presented to the ED with L hip pain and deformity after tripping on the stairs, falling to her left side, and being unable to get on her own. She reports having 8/10 pain on her left hip and being unable to move her left leg due to the pain. She denies any head trauma, LOC, headache, neck pain, back pain, chest pain, dyspnea, abdominal pain, or numbness. In the ED, her vitals were notable for HTN to 176/72 but was otherwise stable. Her labs were unremarkable w/hgb 14.2. Her pelvic XR was notable for left femoral neck fracture with mild superior displacement and impaction. She was given dilaudid 1 mg x 2 and morphine 4 mg x 1 in the ED. She was then admitted to medicine for management of L femoral hip fracture pending L hip arthroplasty by orthopedic surgery. Patient is an 87 yo F w/PMH of HTN, HLD, and dementia who presented to the ED with L hip pain and deformity after tripping on the stairs while trying to run up the stairs, falling to her left side, and being unable to get on her own. She reports having 10/10 sharp pain in her L hip at the time, with the pain currently being 8/10 at the worst and 3/10 after the dilaudid push. She denies any head trauma, LOC, headache, neck pain, back pain, chest pain, dyspnea, abdominal pain, or numbness. In the ED, her vitals were notable for HTN to 176/72 but was otherwise stable. Her labs were unremarkable w/hgb 14.2. Her pelvic XR was notable for left femoral neck fracture with mild superior displacement and impaction. She was given dilaudid 1 mg x 2 and morphine 4 mg x 1 in the ED. She was then admitted to medicine for management of L femoral hip fracture pending L hip arthroplasty by orthopedic surgery. Patient is an 89 yo F w/PMH of HTN, HLD, and dementia who presented to the ED with L hip pain and deformity after tripping on the stairs while trying to run up the stairs, falling to her left side, and being unable to get on her own. She reports having 10/10 sharp pain in her L hip at the time, with the pain currently being 8/10 at the worst and 3/10 after the dilaudid push. She denies any head trauma, LOC, headache, neck pain, back pain, chest pain, dyspnea, abdominal pain, or numbness. In the ED, her vitals were notable for HTN to 176/72 but was otherwise stable. Her labs were unremarkable w/hgb 14.2. Her pelvic XR was notable for left femoral neck fracture with mild superior displacement and impaction. She was given dilaudid 1 mg x 2 and morphine 4 mg x 1 in the ED. She was then admitted to medicine for management of L femoral hip fracture pending L hip arthroplasty by orthopedic surgery.    As per  her daughter,  patient is able to walk on her own at baseline and is able to walk for an hour without feeling short of breath.

## 2024-03-06 NOTE — CHART NOTE - NSCHARTNOTEFT_GEN_A_CORE
PACU ANESTHESIA ADMISSION NOTE      Procedure: Open treatment of fracture of femoral neck with prosthesis  Left femoral neck fracture      Post op diagnosis:  Fracture of femoral neck, left        ____  Intubated  TV:______       Rate: ______      FiO2: ______    __x__  Patent Airway    ____  Full return of protective reflexes    ____  Full recovery from anesthesia / back to baseline     Vitals:   T:   98        R:   12               BP:   160/51                Sat:   97%                P:  81      Mental Status:  __x__ Awake   _____ Alert   _____ Drowsy   _____ Sedated    Nausea/Vomiting:  __x__ NO  ______Yes,   See Post - Op Orders          Pain Scale (0-10):  _____    Treatment: ____ None    ___x_ See Post - Op/PCA Orders    Post - Operative Fluids:   ____ Oral   ___x_ See Post - Op Orders    Plan: Discharge:   ____Home       ___x__Floor     _____Critical Care    _____  Other:_________________    Comments: Uneventful intraoperative course. No anesthesia issues or complications noted.  Patient stable upon arrival to PACU. Report given to RN. Discharge when criteria met.

## 2024-03-07 LAB
ALBUMIN SERPL ELPH-MCNC: 3.7 G/DL — SIGNIFICANT CHANGE UP (ref 3.5–5.2)
ALP SERPL-CCNC: 53 U/L — SIGNIFICANT CHANGE UP (ref 30–115)
ALT FLD-CCNC: 14 U/L — SIGNIFICANT CHANGE UP (ref 0–41)
ANION GAP SERPL CALC-SCNC: 17 MMOL/L — HIGH (ref 7–14)
APTT BLD: 32.1 SEC — SIGNIFICANT CHANGE UP (ref 27–39.2)
AST SERPL-CCNC: 27 U/L — SIGNIFICANT CHANGE UP (ref 0–41)
BASOPHILS # BLD AUTO: 0.04 K/UL — SIGNIFICANT CHANGE UP (ref 0–0.2)
BASOPHILS NFR BLD AUTO: 0.2 % — SIGNIFICANT CHANGE UP (ref 0–1)
BILIRUB SERPL-MCNC: 0.7 MG/DL — SIGNIFICANT CHANGE UP (ref 0.2–1.2)
BUN SERPL-MCNC: 17 MG/DL — SIGNIFICANT CHANGE UP (ref 10–20)
CALCIUM SERPL-MCNC: 8.2 MG/DL — LOW (ref 8.4–10.5)
CHLORIDE SERPL-SCNC: 101 MMOL/L — SIGNIFICANT CHANGE UP (ref 98–110)
CO2 SERPL-SCNC: 22 MMOL/L — SIGNIFICANT CHANGE UP (ref 17–32)
CREAT SERPL-MCNC: 0.8 MG/DL — SIGNIFICANT CHANGE UP (ref 0.7–1.5)
EGFR: 71 ML/MIN/1.73M2 — SIGNIFICANT CHANGE UP
EOSINOPHIL # BLD AUTO: 0.01 K/UL — SIGNIFICANT CHANGE UP (ref 0–0.7)
EOSINOPHIL NFR BLD AUTO: 0.1 % — SIGNIFICANT CHANGE UP (ref 0–8)
GLUCOSE SERPL-MCNC: 124 MG/DL — HIGH (ref 70–99)
HCT VFR BLD CALC: 38.6 % — SIGNIFICANT CHANGE UP (ref 37–47)
HGB BLD-MCNC: 12.7 G/DL — SIGNIFICANT CHANGE UP (ref 12–16)
IMM GRANULOCYTES NFR BLD AUTO: 0.9 % — HIGH (ref 0.1–0.3)
INR BLD: 1.14 RATIO — SIGNIFICANT CHANGE UP (ref 0.65–1.3)
LYMPHOCYTES # BLD AUTO: 0.93 K/UL — LOW (ref 1.2–3.4)
LYMPHOCYTES # BLD AUTO: 5.4 % — LOW (ref 20.5–51.1)
MAGNESIUM SERPL-MCNC: 1.6 MG/DL — LOW (ref 1.8–2.4)
MCHC RBC-ENTMCNC: 29 PG — SIGNIFICANT CHANGE UP (ref 27–31)
MCHC RBC-ENTMCNC: 32.9 G/DL — SIGNIFICANT CHANGE UP (ref 32–37)
MCV RBC AUTO: 88.1 FL — SIGNIFICANT CHANGE UP (ref 81–99)
MONOCYTES # BLD AUTO: 1.51 K/UL — HIGH (ref 0.1–0.6)
MONOCYTES NFR BLD AUTO: 8.8 % — SIGNIFICANT CHANGE UP (ref 1.7–9.3)
NEUTROPHILS # BLD AUTO: 14.45 K/UL — HIGH (ref 1.4–6.5)
NEUTROPHILS NFR BLD AUTO: 84.6 % — HIGH (ref 42.2–75.2)
NRBC # BLD: 0 /100 WBCS — SIGNIFICANT CHANGE UP (ref 0–0)
PLATELET # BLD AUTO: 212 K/UL — SIGNIFICANT CHANGE UP (ref 130–400)
PMV BLD: 10.2 FL — SIGNIFICANT CHANGE UP (ref 7.4–10.4)
POTASSIUM SERPL-MCNC: 3.8 MMOL/L — SIGNIFICANT CHANGE UP (ref 3.5–5)
POTASSIUM SERPL-SCNC: 3.8 MMOL/L — SIGNIFICANT CHANGE UP (ref 3.5–5)
PROT SERPL-MCNC: 5.9 G/DL — LOW (ref 6–8)
PROTHROM AB SERPL-ACNC: 13 SEC — HIGH (ref 9.95–12.87)
RBC # BLD: 4.38 M/UL — SIGNIFICANT CHANGE UP (ref 4.2–5.4)
RBC # FLD: 15.1 % — HIGH (ref 11.5–14.5)
SARS-COV-2 RNA SPEC QL NAA+PROBE: SIGNIFICANT CHANGE UP
SODIUM SERPL-SCNC: 140 MMOL/L — SIGNIFICANT CHANGE UP (ref 135–146)
WBC # BLD: 17.09 K/UL — HIGH (ref 4.8–10.8)
WBC # FLD AUTO: 17.09 K/UL — HIGH (ref 4.8–10.8)

## 2024-03-07 PROCEDURE — 93010 ELECTROCARDIOGRAM REPORT: CPT

## 2024-03-07 PROCEDURE — 99232 SBSQ HOSP IP/OBS MODERATE 35: CPT

## 2024-03-07 RX ORDER — MAGNESIUM SULFATE 500 MG/ML
2 VIAL (ML) INJECTION ONCE
Refills: 0 | Status: COMPLETED | OUTPATIENT
Start: 2024-03-07 | End: 2024-03-07

## 2024-03-07 RX ORDER — OXYCODONE HYDROCHLORIDE 5 MG/1
5 TABLET ORAL EVERY 6 HOURS
Refills: 0 | Status: DISCONTINUED | OUTPATIENT
Start: 2024-03-07 | End: 2024-03-12

## 2024-03-07 RX ORDER — ASPIRIN/CALCIUM CARB/MAGNESIUM 324 MG
81 TABLET ORAL DAILY
Refills: 0 | Status: DISCONTINUED | OUTPATIENT
Start: 2024-03-07 | End: 2024-03-12

## 2024-03-07 RX ORDER — METOPROLOL TARTRATE 50 MG
12.5 TABLET ORAL DAILY
Refills: 0 | Status: DISCONTINUED | OUTPATIENT
Start: 2024-03-07 | End: 2024-03-12

## 2024-03-07 RX ORDER — MAGNESIUM SULFATE 500 MG/ML
2 VIAL (ML) INJECTION
Refills: 0 | Status: DISCONTINUED | OUTPATIENT
Start: 2024-03-07 | End: 2024-03-07

## 2024-03-07 RX ADMIN — Medication 81 MILLIGRAM(S): at 11:12

## 2024-03-07 RX ADMIN — SENNA PLUS 2 TABLET(S): 8.6 TABLET ORAL at 21:04

## 2024-03-07 RX ADMIN — HYDROMORPHONE HYDROCHLORIDE 1 MILLIGRAM(S): 2 INJECTION INTRAMUSCULAR; INTRAVENOUS; SUBCUTANEOUS at 08:24

## 2024-03-07 RX ADMIN — HEPARIN SODIUM 5000 UNIT(S): 5000 INJECTION INTRAVENOUS; SUBCUTANEOUS at 09:59

## 2024-03-07 RX ADMIN — DONEPEZIL HYDROCHLORIDE 10 MILLIGRAM(S): 10 TABLET, FILM COATED ORAL at 21:04

## 2024-03-07 RX ADMIN — HYDROMORPHONE HYDROCHLORIDE 1 MILLIGRAM(S): 2 INJECTION INTRAMUSCULAR; INTRAVENOUS; SUBCUTANEOUS at 08:54

## 2024-03-07 RX ADMIN — Medication 25 GRAM(S): at 11:03

## 2024-03-07 RX ADMIN — Medication 100 MILLIGRAM(S): at 05:10

## 2024-03-07 RX ADMIN — Medication 650 MILLIGRAM(S): at 10:30

## 2024-03-07 RX ADMIN — POLYETHYLENE GLYCOL 3350 17 GRAM(S): 17 POWDER, FOR SOLUTION ORAL at 17:49

## 2024-03-07 RX ADMIN — Medication 650 MILLIGRAM(S): at 10:00

## 2024-03-07 RX ADMIN — Medication 25 GRAM(S): at 14:28

## 2024-03-07 RX ADMIN — Medication 12.5 MILLIGRAM(S): at 11:03

## 2024-03-07 RX ADMIN — AMLODIPINE BESYLATE 10 MILLIGRAM(S): 2.5 TABLET ORAL at 05:10

## 2024-03-07 RX ADMIN — HEPARIN SODIUM 5000 UNIT(S): 5000 INJECTION INTRAVENOUS; SUBCUTANEOUS at 21:04

## 2024-03-07 RX ADMIN — Medication 100 MILLIGRAM(S): at 13:29

## 2024-03-07 RX ADMIN — OXYCODONE HYDROCHLORIDE 5 MILLIGRAM(S): 5 TABLET ORAL at 11:03

## 2024-03-07 RX ADMIN — POLYETHYLENE GLYCOL 3350 17 GRAM(S): 17 POWDER, FOR SOLUTION ORAL at 05:11

## 2024-03-07 RX ADMIN — ATORVASTATIN CALCIUM 40 MILLIGRAM(S): 80 TABLET, FILM COATED ORAL at 21:04

## 2024-03-07 RX ADMIN — HYDROMORPHONE HYDROCHLORIDE 1 MILLIGRAM(S): 2 INJECTION INTRAMUSCULAR; INTRAVENOUS; SUBCUTANEOUS at 22:56

## 2024-03-07 RX ADMIN — HYDROMORPHONE HYDROCHLORIDE 1 MILLIGRAM(S): 2 INJECTION INTRAMUSCULAR; INTRAVENOUS; SUBCUTANEOUS at 13:53

## 2024-03-07 RX ADMIN — OXYCODONE HYDROCHLORIDE 5 MILLIGRAM(S): 5 TABLET ORAL at 21:05

## 2024-03-07 NOTE — PHYSICAL THERAPY INITIAL EVALUATION ADULT - ADDITIONAL COMMENTS
pt lives with daughter in pt lives in private home with spouse/family, has stairs to climb., several steps to enter and a flight of stairs inside.

## 2024-03-07 NOTE — CONSULT NOTE ADULT - ASSESSMENT
IMPRESSION: Rehab of left hip impacted femoral neck fx, s/p hemiarthroplasty / s/p fall / HTN, HLD, and dementia    PRECAUTIONS: [   ] Cardiac  [   ] Respiratory  [   ] Seizures [   ] Contact Isolation  [   ] Droplet Isolation  [   ] Other    Weight Bearing Status: WBAT LLE    RECOMMENDATION:    Out of Bed to Chair     DVT/Decubiti Prophylaxis    REHAB PLAN:     [ x   ] Bedside P/T 3-5 times a week   [x    ]   Bedside O/T  2-3 times a week             [    ] Speech Therapy               [    ]  No Rehab Therapy Indicated   Conditioning/ROM                                    ADL  Bed Mobility                                               Conditioning/ROM  Transfers                                                     Bed Mobility  Sitting /Standing Balance                         Transfers                                        Gait Training                                               Sitting/Standing Balance  Stair Training [   ]Applicable                    Home equipment Eval                                                                        Splinting  [   ] Only      GOALS:   ADL   [   x ]   Independent                    Transfers  [  x  ] Independent                          Ambulation  [ x   ] Independent     [    x ] With device                            [    ]  CG                                                         [    ]  CG                                                                  [    ] CG                            [    ] Min A                                                   [    ] Min A                                                              [    ] Min  A          DISCHARGE PLAN:   [    ]  Good candidate for Intensive Rehabilitation/Hospital based                                             Will tolerate 3hrs Intensive Rehab Daily                                       [     ]  Short Term Rehab in Skilled Nursing Facility                                       [     ]  Home with Outpatient or  services                                         [   x  ]  Possible Candidate for Intensive Hospital based Rehab

## 2024-03-07 NOTE — PROGRESS NOTE ADULT - SUBJECTIVE AND OBJECTIVE BOX
NIDIACAT MCCALL  [facility] 011 A ([facility])    ***My note supersedes ALL resident notes that I sign.  My corrections for their notes are in my note.***    Patient is a 88y old  Female who presents with a chief complaint of L femoral hip fracture s/p mechanical fall (07 Mar 2024 11:41)        Interval events:   Patient seen and examined at bedside. S/p left hip hemiarthroplasty last night. Says she has pain with movement. Daughter at bedside. No fevers. Normal urination. No BM yet.     -PMHx: HTN (hypertension)    Hypercholesteremia    Heavy cigarette smoker      -PSHx:History of laparoscopic cholecystectomy    H/O: hysterectomy    History of surgery            REVIEW OF SYSTEMS:  CONSTITUTIONAL: No fever, weight loss, or fatigue  RESPIRATORY: No cough, wheezing, chills or hemoptysis; No shortness of breath  CARDIOVASCULAR: No chest pain, palpitations, dizziness, or leg swelling  GASTROINTESTINAL: No abdominal or epigastric pain. No nausea, vomiting, or hematemesis; No diarrhea or constipation. No melena or hematochezia.  NEUROLOGICAL: No headaches  LYMPH NODES: No enlarged glands  MUSCULOSKELETAL: as above       T(C): , Max: 37.4 (03-06-24 @ 20:05)  HR: 92 (03-07-24 @ 11:03)  BP: 116/72 (03-07-24 @ 11:03)  RR: 18 (03-07-24 @ 11:03)  SpO2: 97% (03-07-24 @ 07:10)  CAPILLARY BLOOD GLUCOSE        PHYSICAL EXAM:  GENERAL: NAD, lying in bed comfortably, thin elderly female   HEAD:  Atraumatic, Normocephalic  EYES: EOMI, PERRLA, conjunctiva and sclera clear  ENT: Moist mucous membranes  NECK: Supple, No JVD  CHEST/LUNG: Clear to auscultation bilaterally; No rales, rhonchi, wheezing, or rubs. Unlabored respirations  HEART: Regular rate and rhythm; right sided systolic murmur radiating to the carotids.   ABDOMEN: Bowel sounds present; Soft, Nontender, Nondistended. No hepatomegaly  EXTREMITIES:  2+ Peripheral Pulses, brisk capillary refill. No clubbing, cyanosis, or edema; left hip dressing c/d/i   NERVOUS SYSTEM:  Alert & Oriented X3, speech clear. No deficits     Consultant(s) Notes Reviewed:  [x ] YES  [ ] NO  Care Discussed with Consultants/Other Providers [ x] YES  [ ] NO       LABS:          12.7  17.09  )-------(212          38.6  N=84.6  L=5.4  MCV=88.1    140|101|17  ------------------<124<H>  3.8|22|0.8  eGFR:--  Ca:8.2<L>      PT/INR - ( 07 Mar 2024 06:09 )   PT: 13.00 sec;   INR: 1.14 ratio         PTT - ( 07 Mar 2024 06:09 )  PTT:32.1 sec      Microbiology:      RADIOLOGY & ADDITIONAL TESTS:  < from: 12 Lead ECG (03.07.24 @ 08:27) >  Diagnosis Line Sinus rhythm with Premature supraventricular complexes and with frequent and  consecutive Premature ventricular complexes  Right axis deviation  Cannot rule out Anteroseptal infarct , age undetermined  T wave abnormality, consider inferior ischemia  Abnormal ECG    < end of copied text >    < from: 12 Lead ECG (03.06.24 @ 09:02) >  Diagnosis Line Sinus rhythm with Premature atrial complexes  Possible Septal infarct , age undetermined  Abnormal ECG    < end of copied text >    < from: 12 Lead ECG (03.05.24 @ 23:44) >  Diagnosis Line Sinus rhythm with Premature supraventricular complexes and with occasional   Premature ventricular complexes  Septal infarct , age undetermined  Abnormal ECG    < end of copied text >    < from: Xray Pelvis AP only (03.06.24 @ 20:20) >  Findings/  impression:    Post interval resection of the left femoral head and neck and placement   of a hemiarthroplasty in anatomic alignment with postsurgical changes   including soft tissue air and lateral skin staple. There are mild   degenerative changes of the right hip and sacroiliac joints.    < end of copied text >    < from: Xray Chest 1 View- PORTABLE-Urgent (Xray Chest 1 View- PORTABLE-Urgent .) (03.05.24 @ 22:37) >    FINDINGS/  IMPRESSION:    No focal consolidation, pneumothorax or pleural effusion.    Unremarkable cardiomediastinal silhouette.    No radiographically evident acute displaced fracture within the   limitations of this exam.    < end of copied text >    < from: TTE Echo Complete w/ Contrast w/ Doppler (03.06.24 @ 08:27) >  Summary:   1. Technically difficult study.   2. Hyperdynamic global left ventricular systolic function.   3. LV Ejection Fraction by Mejia's Method with a biplane EF of 75 %.   4. Spectral Doppler shows pseudonormal pattern of left ventricular   myocardial filling (Grade II diastolic dysfunction).    5. Mildly enlarged left atrium.   6. Normal right atrial size.   7. Degenerative mitral valve.   8. Moderate thickening and calcification of the anterior and posterior   mitral valve leaflets.   9. Severe mitral annular calcification.  10. Trace mitral valve regurgitation.  11. Mild tricuspid regurgitation.  12. Moderate to severe aortic valve stenosis. Peak/mean PG 71/40 mmHg,   DONTE 1.1 cm^2.  13. Estimated pulmonary artery systolic pressure is 44.5 mmHg assuming a   right atrial pressure of 3 mmHg, which is consistent with mild pulmonary   hypertension.  14. Endocardial visualization was enhanced with intravenous echo contrast.    < end of copied text >          Medications:  acetaminophen     Tablet .. 650 milliGRAM(s) Oral every 6 hours PRN  amLODIPine   Tablet 10 milliGRAM(s) Oral daily  aspirin  chewable 81 milliGRAM(s) Oral daily  atorvastatin 40 milliGRAM(s) Oral at bedtime  ceFAZolin   IVPB 2000 milliGRAM(s) IV Intermittent every 8 hours  donepezil 10 milliGRAM(s) Oral at bedtime  heparin   Injectable 5000 Unit(s) SubCutaneous every 12 hours  hydrochlorothiazide 12.5 milliGRAM(s) Oral daily  HYDROmorphone  Injectable 1 milliGRAM(s) IV Push every 4 hours PRN  magnesium sulfate  IVPB 2 Gram(s) IV Intermittent every 2 hours  metoprolol tartrate 12.5 milliGRAM(s) Oral daily  oxyCODONE    IR 5 milliGRAM(s) Oral every 6 hours PRN  polyethylene glycol 3350 17 Gram(s) Oral two times a day  senna 2 Tablet(s) Oral at bedtime

## 2024-03-07 NOTE — PROGRESS NOTE ADULT - SUBJECTIVE AND OBJECTIVE BOX
POD# 1 S/P HEMIARTHROPLASTY  T(C): 36.8 (03-07-24 @ 07:10), Max: 37.4 (03-06-24 @ 20:05)  HR: 89 (03-07-24 @ 07:10) (61 - 96)  BP: 146/74 (03-07-24 @ 07:10) (86/47 - 166/74)  RR: 18 (03-07-24 @ 07:10) (17 - 20)  SpO2: 97% (03-07-24 @ 07:10) (93% - 98%)                        12.7   17.09 )-----------( 212      ( 07 Mar 2024 06:09 )             38.6     03-05    137  |  99  |  22<H>  ----------------------------<  171<H>  3.5   |  28  |  1.0    Ca    8.9      05 Mar 2024 20:11    TPro  6.9  /  Alb  4.1  /  TBili  0.2  /  DBili  x   /  AST  14  /  ALT  17  /  AlkPhos  87  03-05    PT/INR - ( 07 Mar 2024 06:09 )   PT: 13.00 sec;   INR: 1.14 ratio         PTT - ( 07 Mar 2024 06:09 )  PTT:32.1 sec  PTS PAIN IS CONTROLLED   WOUND DSG CDI   N/V/I  ABX COMPLETE  DVT PROPHYLAXIS IN PLACE SQ HEP   REHAB IN PROGRESS WBAT   D/C PLAN PENDING

## 2024-03-07 NOTE — CONSULT NOTE ADULT - SUBJECTIVE AND OBJECTIVE BOX
HPI:  Patient is an 89 yo F w/PMH of HTN, HLD, and dementia who presented to the ED with L hip pain and deformity after tripping on the stairs while trying to run up the stairs, falling to her left side, and being unable to get on her own. She reports having 10/10 sharp pain in her L hip at the time, with the pain currently being 8/10 at the worst and 3/10 after the dilaudid push. She denies any head trauma, LOC, headache, neck pain, back pain, chest pain, dyspnea, abdominal pain, or numbness. In the ED, her vitals were notable for HTN to 176/72 but was otherwise stable. Her labs were unremarkable w/hgb 14.2. Her pelvic XR was notable for left femoral neck fracture with mild superior displacement and impaction. She was given dilaudid 1 mg x 2 and morphine 4 mg x 1 in the ED. She was then admitted to medicine for management of L femoral hip fracture pending L hip arthroplasty by orthopedic surgery.    As per  her daughter,  patient is able to walk on her own at baseline and is able to walk for an hour without feeling short of breath.     L hip femoral neck fracture, s/p L hip hemiarthroplasty on 3/6/24. - Activity: WBAT LLE      PAST MEDICAL & SURGICAL HISTORY:  HTN (hypertension)      Hypercholesteremia      Heavy cigarette smoker      History of laparoscopic cholecystectomy      H/O: hysterectomy      History of surgery  LEFT EYE CATARACT EXTRACTION WITH LENS  IMPLANT          Hospital Course:    TODAY'S SUBJECTIVE & REVIEW OF SYMPTOMS:     Constitutional WNL   Cardio WNL   Resp WNL   GI WNL  Heme WNL  Endo WNL  Skin WNL  MSK left hip pain   Neuro WNL  Cognitive WNL  Psych WNL      MEDICATIONS  (STANDING):  amLODIPine   Tablet 10 milliGRAM(s) Oral daily  aspirin  chewable 81 milliGRAM(s) Oral daily  atorvastatin 40 milliGRAM(s) Oral at bedtime  ceFAZolin   IVPB 2000 milliGRAM(s) IV Intermittent every 8 hours  donepezil 10 milliGRAM(s) Oral at bedtime  heparin   Injectable 5000 Unit(s) SubCutaneous every 12 hours  hydrochlorothiazide 12.5 milliGRAM(s) Oral daily  magnesium sulfate  IVPB 2 Gram(s) IV Intermittent every 2 hours  metoprolol tartrate 12.5 milliGRAM(s) Oral daily  polyethylene glycol 3350 17 Gram(s) Oral two times a day  senna 2 Tablet(s) Oral at bedtime    MEDICATIONS  (PRN):  acetaminophen     Tablet .. 650 milliGRAM(s) Oral every 6 hours PRN Mild Pain (1 - 3)  HYDROmorphone  Injectable 1 milliGRAM(s) IV Push every 4 hours PRN Severe Pain (7 - 10)  oxyCODONE    IR 5 milliGRAM(s) Oral every 6 hours PRN Moderate Pain (4 - 6)      FAMILY HISTORY:      Allergies    No Known Allergies    Intolerances        SOCIAL HISTORY:    [  ] Etoh  [  ] Smoking  [  ] Substance abuse     Home Environment:  [   ] Home Alone  [  x ] Lives with Family  [   ] Home Health Aid    Dwelling:  [   ] Apartment  [ x  ] Private House  [   ] Adult Home  [   ] Skilled Nursing Facility      [   ] Short Term  [   ] Long Term  [ x  ] Stairs       Elevator [   ]    FUNCTIONAL STATUS PTA: (Check all that apply)  Ambulation: [ x   ]Independent    [   ] Dependent     [   ] Non-Ambulatory  Assistive Device: [   ] SA Cane  [   ]  Q Cane  [   ] Walker  [   ]  Wheelchair  ADL : [ x  ] Independent  [    ]  Dependent       Vital Signs Last 24 Hrs  T(C): 36.8 (07 Mar 2024 07:10), Max: 37.4 (06 Mar 2024 20:05)  T(F): 98.3 (07 Mar 2024 07:10), Max: 99.4 (06 Mar 2024 20:05)  HR: 92 (07 Mar 2024 11:03) (61 - 96)  BP: 116/72 (07 Mar 2024 11:03) (86/47 - 166/74)  BP(mean): 81 (06 Mar 2024 22:15) (61 - 102)  RR: 18 (07 Mar 2024 11:03) (17 - 20)  SpO2: 97% (07 Mar 2024 07:10) (93% - 98%)    Parameters below as of 06 Mar 2024 22:15  Patient On (Oxygen Delivery Method): nasal cannula  O2 Flow (L/min): 2        PHYSICAL EXAM: Awake & Alert  GENERAL: NAD  HEAD:  Normocephalic  CHEST/LUNG: Clear   HEART: S1S2+  ABDOMEN: Soft, Nontender  EXTREMITIES:  no calf tenderness    NERVOUS SYSTEM:  Cranial Nerves 2-12 intact [   ] Abnormal  [   ]  ROM: WFL all extremities [   ]  Abnormal [x   ]limited LLE  Motor Strength: WFL all extremities  [   ]  Abnormal [ x  ]limited LLE  Sensation: intact to light touch [ x  ] Abnormal [   ]    FUNCTIONAL STATUS:  Bed Mobility: Independent [   ]  Supervision [   ]  Needs Assistance [x   ]  N/A [   ]  Transfers: Independent [   ]  Supervision [   ]  Needs Assistance [   ]  N/A [   ]   Ambulation: Independent [   ]  Supervision [   ]  Needs Assistance [   ]  N/A [   ]  ADL: Independent [   ] Requires Assistance [   ] N/A [   ]      LABS:                        12.7   17.09 )-----------( 212      ( 07 Mar 2024 06:09 )             38.6     03-07    140  |  101  |  17  ----------------------------<  124<H>  3.8   |  22  |  0.8    Ca    8.2<L>      07 Mar 2024 06:09  Mg     1.6     03-07    TPro  5.9<L>  /  Alb  3.7  /  TBili  0.7  /  DBili  x   /  AST  27  /  ALT  14  /  AlkPhos  53  03-07    PT/INR - ( 07 Mar 2024 06:09 )   PT: 13.00 sec;   INR: 1.14 ratio         PTT - ( 07 Mar 2024 06:09 )  PTT:32.1 sec  Urinalysis Basic - ( 07 Mar 2024 06:09 )    Color: x / Appearance: x / SG: x / pH: x  Gluc: 124 mg/dL / Ketone: x  / Bili: x / Urobili: x   Blood: x / Protein: x / Nitrite: x   Leuk Esterase: x / RBC: x / WBC x   Sq Epi: x / Non Sq Epi: x / Bacteria: x        RADIOLOGY & ADDITIONAL STUDIES:  
ORTHOPAEDIC SURGERY CONSULT NOTE    Reason for consult: left femoral neck fracture    HPI: 88F, pmh HTN, HLD, dementia    Patient is alert and oriented s/p mechanical fall, c/o pain and inability to ambulate after the fall.  Reports pain to the left hip region. Patient lives upstairs in two family home, heard phone ringing and ran up the stairs sustaining mechanical fall. Prior to the fall the patient was a community ambulator without an assistive device. Denies AC. Denies head strike/  The patient and family are in agreement with the plan for left hip hemiarthroplasty versus MARIAJOSE. Risks, benefits and alternatives have been discused and the patient and family. Consent signed and witnessed appropriately.    Fracture of unspecified part of neck of left femur, initial encounter for closed fracture    MEWS Score    HTN (hypertension)    Hypercholesteremia    Heavy cigarette smoker    Fracture of left hip    History of laparoscopic cholecystectomy    H/O: hysterectomy    History of surgery    FALL    [email or web address]        No Known Allergies      Physical exam:  T(C): 36.7 (03-05-24 @ 19:49), Max: 36.7 (03-05-24 @ 19:49)  HR: 104 (03-05-24 @ 19:49) (104 - 104)  BP: 176/72 (03-05-24 @ 19:49) (176/72 - 176/72)  RR: --  SpO2: 99% (03-05-24 @ 19:49) (99% - 99%)    LLE:  The LLE is shortened and externally rotated  +TTP groin  NTTP thigh, knee, tib/fib, foot/ankle  ROM of the foot/ankle full and painless  Pain with motion of the hip  NVID distally in all distributions with 2+ pulses    Labs:                        14.2   10.40 )-----------( 249      ( 05 Mar 2024 20:11 )             42.3     03-05    137  |  99  |  22<H>  ----------------------------<  171<H>  3.5   |  28  |  1.0    Ca    8.9      05 Mar 2024 20:11    TPro  6.9  /  Alb  4.1  /  TBili  0.2  /  DBili  x   /  AST  14  /  ALT  17  /  AlkPhos  87  03-05    PT/INR - ( 05 Mar 2024 20:11 )   PT: 11.80 sec;   INR: 1.03 ratio         PTT - ( 05 Mar 2024 20:11 )  PTT:28.2 sec    Imaging:  XR: Displaced left femoral neck fracture.    Assesment/Plan: 88F w/ a left displaced femoral neck hip fracture.    - Admit to medicine. Plan for Left hip hemiarthroplasty 3/6/2024 pending medical clearance and OR availabilty.     - Bed rest  - Pain control per primary team  - DVT PPx per primary, to be held on morning of procedure  - Patient requires Adult Medicine pre-op risk stratification / medical optimization  - Pre-Op CBC, CMP/BMP, PT/PTT/INR, Type & Screen x2, CXR, EKG,  - Trend Hgb  - 2u RBC on hold for surgery  - NPO for surgery tomorrow 3/6
  Outpt cardiologist:    Reason for Consult:     HISTORY OF PRESENT ILLNESS:  Patient is an 89 yo F w/PMH of HTN, HLD, and dementia who presented to the ED with L hip pain and deformity after tripping on the stairs while trying to run up the stairs, falling to her left side, and being unable to get on her own. She reports having 10/10 sharp pain in her L hip at the time, with the pain currently being 8/10 at the worst and 3/10 after the dilaudid push. She denies any head trauma, LOC, headache, neck pain, back pain, chest pain, dyspnea, abdominal pain, or numbness. In the ED, her vitals were notable for HTN to 176/72 but was otherwise stable. Her labs were unremarkable w/hgb 14.2. Her pelvic XR was notable for left femoral neck fracture with mild superior displacement and impaction. She was given dilaudid 1 mg x 2 and morphine 4 mg x 1 in the ED. She was then admitted to medicine for management of L femoral hip fracture pending L hip arthroplasty by orthopedic surgery.    As per  her daughter,  patient is able to walk on her own at baseline and is able to walk for an hour without feeling short of breath. (06 Mar 2024 02:18)      Cardiology Fellow Notes    Significant Cardiovascular History:  - Denies     Notable Comorbidities and Risk Factors:   - Forgetfulness   - HTN    PAST MEDICAL & SURGICAL HISTORY  HTN (hypertension)    Hypercholesteremia    Heavy cigarette smoker    History of laparoscopic cholecystectomy    H/O: hysterectomy    History of surgery  LEFT EYE CATARACT EXTRACTION WITH LENS  IMPLANT        FAMILY HISTORY:  FAMILY HISTORY:      SOCIAL HISTORY:  Social History:      ALLERGIES:  No Known Allergies      MEDICATIONS:  amLODIPine   Tablet 10 milliGRAM(s) Oral daily  atorvastatin 40 milliGRAM(s) Oral at bedtime  donepezil 10 milliGRAM(s) Oral at bedtime  hydrochlorothiazide 12.5 milliGRAM(s) Oral daily  polyethylene glycol 3350 17 Gram(s) Oral two times a day  senna 2 Tablet(s) Oral at bedtime  sodium chloride 0.9%. 1000 milliLiter(s) (75 mL/Hr) IV Continuous <Continuous>    PRN:  acetaminophen     Tablet .. 650 milliGRAM(s) Oral every 6 hours PRN  HYDROmorphone  Injectable 1 milliGRAM(s) IV Push every 4 hours PRN  oxycodone    5 mG/acetaminophen 325 mG 1 Tablet(s) Oral every 6 hours PRN      HOME MEDICATIONS:  Home Medications:  amLODIPine 10 mg oral tablet: 1 tab(s) orally once a day (06 Mar 2024 06:56)  donepezil 10 mg oral tablet: 1 tab(s) orally once a day (06 Mar 2024 06:57)  hydroCHLOROthiazide 12.5 mg oral capsule: 1 cap(s) orally once a day (27 May 2022 08:49)  Lipitor 40 mg oral tablet: 1 tab(s) orally once a day (27 May 2022 08:49)      VITALS:   T(F): 98.4 (- @ 08:37), Max: 98.4 (- @ 08:37)  HR: 83 ( @ 08:37) (80 - 104)  BP: 143/66 ( @ 08:37) (143/66 - 176/72)  BP(mean): --  RR: 18 ( @ 08:37) (18 - 18)  SpO2: 97% ( @ 02:59) (97% - 99%)    I&O's Summary      REVIEW OF SYSTEMS:  CONSTITUTIONAL: No weakness, fevers or chills  HEENT: No visual changes, neck/ear pain  RESPIRATORY: No cough, sob  CARDIOVASCULAR: See HPI  GASTROINTESTINAL: No abdominal pain. No nausea, vomiting, diarrhea   GENITOURINARY: No dysuria, frequency or hematuria  NEUROLOGICAL: No new focal deficits  SKIN: No new rashes    PHYSICAL EXAM:  *General: Not in distress.  Non-toxic appearing.   *Cardio: regular, S1, S2, right sided systolic murmur radiating to the carotids.   *Pulm: B/L BS.  No wheezing / crackles / rales  *Abdomen: Soft, non-tender, non-distended. Normoactive bowel sounds  *Extremities: No edema b/l le. Warm. Knee caps warm. Pulses + bilaterally. Normal capillary refill.   *Neuro: A&O x3. No focal deficits    LABS:                        14.2   10.40 )-----------( 249      ( 05 Mar 2024 20:11 )             42.3     03-    137  |  99  |  22<H>  ----------------------------<  171<H>  3.5   |  28  |  1.0    Ca    8.9      05 Mar 2024 20:11    TPro  6.9  /  Alb  4.1  /  TBili  0.2  /  DBili  x   /  AST  14  /  ALT  17  /  AlkPhos  87  -    PT/INR - ( 05 Mar 2024 20:11 )   PT: 11.80 sec;   INR: 1.03 ratio         PTT - ( 05 Mar 2024 20:11 )  PTT:28.2 sec    Troponin trend:    IMAGING:  - TTE:  < from: TTE Echo Complete w/ Contrast w/ Doppler (24 @ 08:27) >  Summary:   1. Technically difficult study.   2. Hyperdynamic global left ventricular systolic function.   3. LV Ejection Fraction by Mejia's Method with a biplane EF of 75 %.   4. Spectral Doppler shows pseudonormal pattern of left ventricular   myocardial filling (Grade II diastolic dysfunction).   5. Mildly enlarged left atrium.   6. Normal right atrial size.   7. Degenerative mitral valve.   8. Moderate thickening and calcification of the anterior and posterior   mitral valve leaflets.   9. Severe mitral annular calcification.  10. Trace mitral valve regurgitation.  11. Mild tricuspid regurgitation.  12. Moderate to severe aortic valve stenosis. Peak/mean PG 71/40 mmHg,   DONTE 1.1 cm^2.  13. Estimated pulmonary artery systolic pressure is 44.5 mmHg assuming a   right atrial pressure of 3 mmHg, which is consistent with mild pulmonary   hypertension.  14. Endocardial visualization was enhanced with intravenous echo contrast.    EC Lead ECG:   Ventricular Rate 85 BPM    Atrial Rate 85 BPM    P-R Interval 202 ms    QRS Duration 70 ms    Q-T Interval 382 ms    QTC Calculation(Bazett) 454 ms    P Axis 78 degrees    R Axis 48 degrees    T Axis 13 degrees    Diagnosis Line Sinus rhythm with Premature supraventricular complexes and with occasional   Premature ventricular complexes  Septal infarct , age undetermined  Abnormal ECG    Confirmed by AMBROCIO MCKEE MD ([id number]) on 3/6/2024 6:42:28 AM ( @ 23:44)      TELEMETRY EVENTS:

## 2024-03-07 NOTE — PHYSICAL THERAPY INITIAL EVALUATION ADULT - PERTINENT HX OF CURRENT PROBLEM, REHAB EVAL
Patient is an 87 yo F w/PMH of HTN, HLD, and dementia who presented to the ED with L hip pain and deformity after tripping on the stairs while trying to run up the stairs, falling to her left side, and being unable to get on her own. She reports having 10/10 sharp pain in her L hip at the time, with the pain currently being 8/10 at the worst and 3/10 after the dilaudid push. She denies any head trauma, LOC, headache, neck pain, back pain, chest pain, dyspnea, abdominal pain, or numbness. In the ED, her vitals were notable for HTN to 176/72 but was otherwise stable. Her labs were unremarkable w/hgb 14.2. Her pelvic XR was notable for left femoral neck fracture with mild superior displacement and impaction. She was given dilaudid 1 mg x 2 and morphine 4 mg x 1 in the ED. She was then admitted to medicine for management of L femoral hip fracture pending L hip arthroplasty by orthopedic surgery.

## 2024-03-07 NOTE — PHYSICAL THERAPY INITIAL EVALUATION ADULT - GENERAL OBSERVATIONS, REHAB EVAL
pt encountered in bed in NAD, daughter present. LLE dressing C/D/I.  Patient performed bed mobility, transfers, and ambulated with assistance, limited by LLE pain.  Pt would benefit from continued PT to restore prior level of mobility and safety. 1:35-2:10 pt encountered in bed in NAD, daughter present. LLE dressing C/D/I.  Patient performed bed mobility, transfers, and ambulated with assistance, limited by LLE pain.  Pt would benefit from continued PT to restore prior level of mobility and safety.

## 2024-03-07 NOTE — PROGRESS NOTE ADULT - ASSESSMENT
87 yo F w/PMH of HTN, HLD, and dementia who presented to the ED with L hip pain and deformity after tripping on the stairs, falling to her left side, and being unable to get on her own now admitted to medicine for management of L femoral hip fracture pending L hip hemiarthroplasty by orthopedic surgery.    #Mechanical fall, no syncope  #L femoral hip fracture s/p hemiarthroplasty 3/6/24   -PT/rehab- poss 4a candidate?  -pain control  - Bowel reg: Senna and miralax   -dvt ppx: heparin 5000u sq q12h     #HTN  - c/w home amlodipine 10 mg daily and HCTZ 12.5 mg daily  -now on lopressor 12.5mg po q12h     #Moderate-severe AS  -o/p cardio   -keep euvolemic   -c/w beta blocker and baby aspirin    #Hypomagnesemia  -replete to >2.0      #HLD  - c/w home lipitor 40 mg nightly    #Dementia  -Patient and daughter report worsening forgetfulness lately  - Patient reports she has an appointment with a neurologist outpatient  - c/w home donepezil 10 mg daily    #Progress Note Handoff  Pending (specify): pt/rehab, pain control, monitor labs  Family discussion: Updated daughter Matilda at bedside, answered all questions.   Disposition: SNF vs 4a; send covid swab

## 2024-03-08 LAB
ANION GAP SERPL CALC-SCNC: 11 MMOL/L — SIGNIFICANT CHANGE UP (ref 7–14)
ANION GAP SERPL CALC-SCNC: 12 MMOL/L — SIGNIFICANT CHANGE UP (ref 7–14)
ANION GAP SERPL CALC-SCNC: 8 MMOL/L — SIGNIFICANT CHANGE UP (ref 7–14)
APPEARANCE UR: CLEAR — SIGNIFICANT CHANGE UP
BASOPHILS # BLD AUTO: 0.06 K/UL — SIGNIFICANT CHANGE UP (ref 0–0.2)
BASOPHILS NFR BLD AUTO: 0.4 % — SIGNIFICANT CHANGE UP (ref 0–1)
BILIRUB UR-MCNC: NEGATIVE — SIGNIFICANT CHANGE UP
BUN SERPL-MCNC: 22 MG/DL — HIGH (ref 10–20)
BUN SERPL-MCNC: 22 MG/DL — HIGH (ref 10–20)
BUN SERPL-MCNC: 24 MG/DL — HIGH (ref 10–20)
BUN SERPL-MCNC: 24 MG/DL — HIGH (ref 10–20)
CALCIUM SERPL-MCNC: 7.5 MG/DL — LOW (ref 8.4–10.5)
CALCIUM SERPL-MCNC: 7.6 MG/DL — LOW (ref 8.4–10.5)
CALCIUM SERPL-MCNC: 7.6 MG/DL — LOW (ref 8.4–10.5)
CALCIUM SERPL-MCNC: 7.7 MG/DL — LOW (ref 8.4–10.5)
CHLORIDE SERPL-SCNC: 92 MMOL/L — LOW (ref 98–110)
CHLORIDE SERPL-SCNC: 92 MMOL/L — LOW (ref 98–110)
CHLORIDE SERPL-SCNC: 93 MMOL/L — LOW (ref 98–110)
CO2 SERPL-SCNC: 22 MMOL/L — SIGNIFICANT CHANGE UP (ref 17–32)
CO2 SERPL-SCNC: 22 MMOL/L — SIGNIFICANT CHANGE UP (ref 17–32)
CO2 SERPL-SCNC: 26 MMOL/L — SIGNIFICANT CHANGE UP (ref 17–32)
CO2 SERPL-SCNC: 26 MMOL/L — SIGNIFICANT CHANGE UP (ref 17–32)
COLOR SPEC: SIGNIFICANT CHANGE UP
CREAT SERPL-MCNC: 0.7 MG/DL — SIGNIFICANT CHANGE UP (ref 0.7–1.5)
CREAT SERPL-MCNC: 0.7 MG/DL — SIGNIFICANT CHANGE UP (ref 0.7–1.5)
CREAT SERPL-MCNC: 0.8 MG/DL — SIGNIFICANT CHANGE UP (ref 0.7–1.5)
CREAT SERPL-MCNC: 0.8 MG/DL — SIGNIFICANT CHANGE UP (ref 0.7–1.5)
DIFF PNL FLD: NEGATIVE — SIGNIFICANT CHANGE UP
EGFR: 71 ML/MIN/1.73M2 — SIGNIFICANT CHANGE UP
EGFR: 71 ML/MIN/1.73M2 — SIGNIFICANT CHANGE UP
EGFR: 84 ML/MIN/1.73M2 — SIGNIFICANT CHANGE UP
EGFR: 84 ML/MIN/1.73M2 — SIGNIFICANT CHANGE UP
EOSINOPHIL # BLD AUTO: 0.08 K/UL — SIGNIFICANT CHANGE UP (ref 0–0.7)
EOSINOPHIL NFR BLD AUTO: 0.5 % — SIGNIFICANT CHANGE UP (ref 0–8)
GLUCOSE SERPL-MCNC: 100 MG/DL — HIGH (ref 70–99)
GLUCOSE SERPL-MCNC: 119 MG/DL — HIGH (ref 70–99)
GLUCOSE SERPL-MCNC: 135 MG/DL — HIGH (ref 70–99)
GLUCOSE SERPL-MCNC: 139 MG/DL — HIGH (ref 70–99)
GLUCOSE UR QL: NEGATIVE MG/DL — SIGNIFICANT CHANGE UP
HCT VFR BLD CALC: 33.3 % — LOW (ref 37–47)
HGB BLD-MCNC: 11 G/DL — LOW (ref 12–16)
IMM GRANULOCYTES NFR BLD AUTO: 0.9 % — HIGH (ref 0.1–0.3)
KETONES UR-MCNC: NEGATIVE MG/DL — SIGNIFICANT CHANGE UP
LEUKOCYTE ESTERASE UR-ACNC: NEGATIVE — SIGNIFICANT CHANGE UP
LYMPHOCYTES # BLD AUTO: 1.53 K/UL — SIGNIFICANT CHANGE UP (ref 1.2–3.4)
LYMPHOCYTES # BLD AUTO: 9 % — LOW (ref 20.5–51.1)
MAGNESIUM SERPL-MCNC: 2.3 MG/DL — SIGNIFICANT CHANGE UP (ref 1.8–2.4)
MCHC RBC-ENTMCNC: 28.5 PG — SIGNIFICANT CHANGE UP (ref 27–31)
MCHC RBC-ENTMCNC: 33 G/DL — SIGNIFICANT CHANGE UP (ref 32–37)
MCV RBC AUTO: 86.3 FL — SIGNIFICANT CHANGE UP (ref 81–99)
MONOCYTES # BLD AUTO: 2.11 K/UL — HIGH (ref 0.1–0.6)
MONOCYTES NFR BLD AUTO: 12.4 % — HIGH (ref 1.7–9.3)
NEUTROPHILS # BLD AUTO: 13.04 K/UL — HIGH (ref 1.4–6.5)
NEUTROPHILS NFR BLD AUTO: 76.8 % — HIGH (ref 42.2–75.2)
NITRITE UR-MCNC: NEGATIVE — SIGNIFICANT CHANGE UP
NRBC # BLD: 0 /100 WBCS — SIGNIFICANT CHANGE UP (ref 0–0)
OSMOLALITY SERPL: 276 MOS/KG — LOW (ref 280–301)
OSMOLALITY UR: 892 MOS/KG — SIGNIFICANT CHANGE UP (ref 50–1200)
PH UR: 5.5 — SIGNIFICANT CHANGE UP (ref 5–8)
PLATELET # BLD AUTO: 188 K/UL — SIGNIFICANT CHANGE UP (ref 130–400)
PMV BLD: 10.3 FL — SIGNIFICANT CHANGE UP (ref 7.4–10.4)
POTASSIUM SERPL-MCNC: 3.5 MMOL/L — SIGNIFICANT CHANGE UP (ref 3.5–5)
POTASSIUM SERPL-MCNC: 3.6 MMOL/L — SIGNIFICANT CHANGE UP (ref 3.5–5)
POTASSIUM SERPL-MCNC: 3.7 MMOL/L — SIGNIFICANT CHANGE UP (ref 3.5–5)
POTASSIUM SERPL-SCNC: 3.5 MMOL/L — SIGNIFICANT CHANGE UP (ref 3.5–5)
POTASSIUM SERPL-SCNC: 3.6 MMOL/L — SIGNIFICANT CHANGE UP (ref 3.5–5)
POTASSIUM SERPL-SCNC: 3.7 MMOL/L — SIGNIFICANT CHANGE UP (ref 3.5–5)
POTASSIUM UR-SCNC: 64 MMOL/L — SIGNIFICANT CHANGE UP
PROT UR-MCNC: SIGNIFICANT CHANGE UP MG/DL
RBC # BLD: 3.86 M/UL — LOW (ref 4.2–5.4)
RBC # FLD: 15 % — HIGH (ref 11.5–14.5)
SODIUM SERPL-SCNC: 126 MMOL/L — LOW (ref 135–146)
SODIUM SERPL-SCNC: 127 MMOL/L — LOW (ref 135–146)
SODIUM SERPL-SCNC: 129 MMOL/L — LOW (ref 135–146)
SODIUM UR-SCNC: 41 MMOL/L — SIGNIFICANT CHANGE UP
SP GR SPEC: >1.03 — HIGH (ref 1–1.03)
UROBILINOGEN FLD QL: 1 MG/DL — SIGNIFICANT CHANGE UP (ref 0.2–1)
WBC # BLD: 16.98 K/UL — HIGH (ref 4.8–10.8)
WBC # FLD AUTO: 16.98 K/UL — HIGH (ref 4.8–10.8)

## 2024-03-08 PROCEDURE — 93010 ELECTROCARDIOGRAM REPORT: CPT

## 2024-03-08 PROCEDURE — 71045 X-RAY EXAM CHEST 1 VIEW: CPT | Mod: 26

## 2024-03-08 PROCEDURE — 99233 SBSQ HOSP IP/OBS HIGH 50: CPT

## 2024-03-08 RX ORDER — BUDESONIDE AND FORMOTEROL FUMARATE DIHYDRATE 160; 4.5 UG/1; UG/1
2 AEROSOL RESPIRATORY (INHALATION) EVERY 12 HOURS
Refills: 0 | Status: DISCONTINUED | OUTPATIENT
Start: 2024-03-08 | End: 2024-03-09

## 2024-03-08 RX ORDER — IPRATROPIUM/ALBUTEROL SULFATE 18-103MCG
3 AEROSOL WITH ADAPTER (GRAM) INHALATION EVERY 6 HOURS
Refills: 0 | Status: DISCONTINUED | OUTPATIENT
Start: 2024-03-08 | End: 2024-03-12

## 2024-03-08 RX ORDER — SODIUM CHLORIDE 9 MG/ML
1000 INJECTION INTRAMUSCULAR; INTRAVENOUS; SUBCUTANEOUS
Refills: 0 | Status: DISCONTINUED | OUTPATIENT
Start: 2024-03-08 | End: 2024-03-10

## 2024-03-08 RX ORDER — ALBUTEROL 90 UG/1
2 AEROSOL, METERED ORAL EVERY 6 HOURS
Refills: 0 | Status: DISCONTINUED | OUTPATIENT
Start: 2024-03-08 | End: 2024-03-12

## 2024-03-08 RX ADMIN — HYDROMORPHONE HYDROCHLORIDE 1 MILLIGRAM(S): 2 INJECTION INTRAMUSCULAR; INTRAVENOUS; SUBCUTANEOUS at 13:14

## 2024-03-08 RX ADMIN — HYDROMORPHONE HYDROCHLORIDE 1 MILLIGRAM(S): 2 INJECTION INTRAMUSCULAR; INTRAVENOUS; SUBCUTANEOUS at 19:05

## 2024-03-08 RX ADMIN — Medication 81 MILLIGRAM(S): at 11:51

## 2024-03-08 RX ADMIN — SODIUM CHLORIDE 50 MILLILITER(S): 9 INJECTION INTRAMUSCULAR; INTRAVENOUS; SUBCUTANEOUS at 21:24

## 2024-03-08 RX ADMIN — HYDROMORPHONE HYDROCHLORIDE 1 MILLIGRAM(S): 2 INJECTION INTRAMUSCULAR; INTRAVENOUS; SUBCUTANEOUS at 13:30

## 2024-03-08 RX ADMIN — HEPARIN SODIUM 5000 UNIT(S): 5000 INJECTION INTRAVENOUS; SUBCUTANEOUS at 11:52

## 2024-03-08 RX ADMIN — OXYCODONE HYDROCHLORIDE 5 MILLIGRAM(S): 5 TABLET ORAL at 22:12

## 2024-03-08 RX ADMIN — HYDROMORPHONE HYDROCHLORIDE 1 MILLIGRAM(S): 2 INJECTION INTRAMUSCULAR; INTRAVENOUS; SUBCUTANEOUS at 08:17

## 2024-03-08 RX ADMIN — Medication 3 MILLILITER(S): at 20:36

## 2024-03-08 RX ADMIN — BUDESONIDE AND FORMOTEROL FUMARATE DIHYDRATE 2 PUFF(S): 160; 4.5 AEROSOL RESPIRATORY (INHALATION) at 21:24

## 2024-03-08 RX ADMIN — POLYETHYLENE GLYCOL 3350 17 GRAM(S): 17 POWDER, FOR SOLUTION ORAL at 05:04

## 2024-03-08 RX ADMIN — POLYETHYLENE GLYCOL 3350 17 GRAM(S): 17 POWDER, FOR SOLUTION ORAL at 18:50

## 2024-03-08 RX ADMIN — HEPARIN SODIUM 5000 UNIT(S): 5000 INJECTION INTRAVENOUS; SUBCUTANEOUS at 21:24

## 2024-03-08 RX ADMIN — HYDROMORPHONE HYDROCHLORIDE 1 MILLIGRAM(S): 2 INJECTION INTRAMUSCULAR; INTRAVENOUS; SUBCUTANEOUS at 18:51

## 2024-03-08 RX ADMIN — ATORVASTATIN CALCIUM 40 MILLIGRAM(S): 80 TABLET, FILM COATED ORAL at 21:24

## 2024-03-08 RX ADMIN — HYDROMORPHONE HYDROCHLORIDE 1 MILLIGRAM(S): 2 INJECTION INTRAMUSCULAR; INTRAVENOUS; SUBCUTANEOUS at 08:12

## 2024-03-08 RX ADMIN — AMLODIPINE BESYLATE 10 MILLIGRAM(S): 2.5 TABLET ORAL at 05:04

## 2024-03-08 RX ADMIN — DONEPEZIL HYDROCHLORIDE 10 MILLIGRAM(S): 10 TABLET, FILM COATED ORAL at 21:23

## 2024-03-08 RX ADMIN — OXYCODONE HYDROCHLORIDE 5 MILLIGRAM(S): 5 TABLET ORAL at 05:05

## 2024-03-08 NOTE — PROGRESS NOTE ADULT - ASSESSMENT
89 yo F w/PMH of HTN, HLD, and dementia who presented to the ED with L hip pain and deformity after tripping on the stairs, falling to her left side, and being unable to get on her own now admitted to medicine for management of L femoral hip fracture pending L hip hemiarthroplasty by orthopedic surgery.    #Mechanical fall, no syncope  #L femoral hip fracture s/p hemiarthroplasty 3/6/24   -PT/rehab- poss 4a candidate?  -pain control  - Bowel reg: Senna and miralax   -dvt ppx: heparin 5000u sq q12h     #Acute hyponatremia  -went from 140 on 3/7 to 127 on 3/8       #HTN  - c/w home amlodipine 10 mg daily; holding HCTZ  -now on lopressor 12.5mg po q12h     #Moderate-severe AS  -o/p cardio   -keep euvolemic   -c/w beta blocker and baby aspirin    #Hypomagnesemia  -replete to >2.0      #HLD  - c/w home lipitor 40 mg nightly    #Dementia  -Patient and daughter report worsening forgetfulness lately  - Patient reports she has an appointment with a neurologist outpatient  - c/w home donepezil 10 mg daily    #Progress Note Handoff  Pending (specify): pt/rehab, pain control, monitor labs  Family discussion: Updated daughter Matilda at bedside, answered all questions.   Disposition: SNF vs 4a; send covid swab    87 yo F w/PMH of HTN, HLD, and dementia who presented to the ED with L hip pain and deformity after tripping on the stairs, falling to her left side, and being unable to get on her own now admitted to medicine for management of L femoral hip fracture pending L hip hemiarthroplasty by orthopedic surgery.    #Mechanical fall, no syncope  #L femoral hip fracture s/p hemiarthroplasty 3/6/24   -PT/rehab- poss 4a candidate?  -pain control  - Bowel reg: Senna and miralax   -dvt ppx: heparin 5000u sq q12h     #Acute hyponatremia  -patient observed to be drinking a lot of water   -went from 140 on 3/7 to 127 on 3/8   -patient asymptomatic  -patient's mucous membranes dry  -hypoosmolar  -try NS at 50cc/hr; trend bmp closely at 4, 8, 1130, AM   restrict free water; encourage PO intake   -d/c HCTZ    #HTN  - c/w home amlodipine 10 mg daily; holding HCTZ  -now on lopressor 12.5mg po q12h   -if BP rises can try ACE or ARB     #Moderate-severe AS  -o/p cardio   -keep euvolemic   -c/w beta blocker and baby aspirin    #Hypomagnesemia   -replete to >2.0      #HLD  - c/w home lipitor 40 mg nightly    #Dementia  -Patient and daughter report worsening forgetfulness lately  - Patient reports she has an appointment with a neurologist outpatient  - c/w home donepezil 10 mg daily    #Progress Note Handoff  Pending (specify): pt/rehab, pain control, monitor Na closely  Family discussion: Updated granddaughter Daphne at bedside, answered all questions.   Disposition: SNF vs 4a   High risk given above acuity and comorbidities.

## 2024-03-08 NOTE — OCCUPATIONAL THERAPY INITIAL EVALUATION ADULT - PERTINENT HX OF CURRENT PROBLEM, REHAB EVAL
89 yo F w/PMH of HTN, HLD, and dementia who presented to the ED with L hip pain and deformity after tripping on the stairs while trying to run up the stairs, falling to her left side, and being unable to get on her own. She reports having 10/10 sharp pain in her L hip at the time, with the pain currently being 8/10 at the worst and 3/10 after the dilaudid push. She denies any head trauma, LOC, headache, neck pain, back pain, chest pain, dyspnea, abdominal pain, or numbness. In the ED, her vitals were notable for HTN to 176/72 but was otherwise stable. Her labs were unremarkable w/hgb 14.2. Her pelvic XR was notable for left femoral neck fracture with mild superior displacement and impaction. She was given dilaudid 1 mg x 2 and morphine 4 mg x 1 in the ED. She was then admitted to medicine for management of L femoral hip fracture pending L hip arthroplasty by orthopedic surgery.

## 2024-03-08 NOTE — PROGRESS NOTE ADULT - SUBJECTIVE AND OBJECTIVE BOX
Patient is a 88y old  Female who presents with a chief complaint of L femoral hip fracture s/p mechanical fall      HPI:  Patient is an 89 yo F w/PMH of HTN, HLD, and dementia who presented to the ED with L hip pain and deformity after tripping on the stairs while trying to run up the stairs, falling to her left side, and being unable to get on her own. She reports having 10/10 sharp pain in her L hip at the time, with the pain currently being 8/10 at the worst and 3/10 after the dilaudid push. She denies any head trauma, LOC, headache, neck pain, back pain, chest pain, dyspnea, abdominal pain, or numbness. In the ED, her vitals were notable for HTN to 176/72 but was otherwise stable. Her labs were unremarkable w/hgb 14.2. Her pelvic XR was notable for left femoral neck fracture with mild superior displacement and impaction. She was given dilaudid 1 mg x 2 and morphine 4 mg x 1 in the ED. She was then admitted to medicine for management of L femoral hip fracture pending L hip arthroplasty by orthopedic surgery.    As per  her daughter,  patient is able to walk on her own at baseline and is able to walk for an hour without feeling short of breath.     L hip femoral neck fracture, s/p L hip hemiarthroplasty on 3/6/24. - Activity: WBAT LLE      PHYSICAL EXAM    Vital Signs Last 24 Hrs  T(C): 36.9 (08 Mar 2024 13:00), Max: 37.2 (08 Mar 2024 04:46)  T(F): 98.4 (08 Mar 2024 13:00), Max: 99 (08 Mar 2024 04:46)  HR: 89 (08 Mar 2024 13:00) (78 - 95)  BP: 132/67 (08 Mar 2024 13:00) (114/69 - 138/59)  BP(mean): --  RR: 18 (08 Mar 2024 13:00) (18 - 18)  SpO2: 97% (08 Mar 2024 13:00) (92% - 99%)        Constitutional - NAD  Chest - CTA  Cardiovascular - S1S2+  Abdomen -  Soft  Extremities -  No calf tenderness   Function : bed mobility and transfer mod A              ambulate 5'RW mod A / lower body dressing dependent     acetaminophen     Tablet .. 650 milliGRAM(s) Oral every 6 hours PRN  amLODIPine   Tablet 10 milliGRAM(s) Oral daily  aspirin  chewable 81 milliGRAM(s) Oral daily  atorvastatin 40 milliGRAM(s) Oral at bedtime  donepezil 10 milliGRAM(s) Oral at bedtime  heparin   Injectable 5000 Unit(s) SubCutaneous every 12 hours  HYDROmorphone  Injectable 1 milliGRAM(s) IV Push every 4 hours PRN  metoprolol tartrate 12.5 milliGRAM(s) Oral daily  oxyCODONE    IR 5 milliGRAM(s) Oral every 6 hours PRN  polyethylene glycol 3350 17 Gram(s) Oral two times a day  senna 2 Tablet(s) Oral at bedtime  sodium chloride 0.9%. 1000 milliLiter(s) IV Continuous <Continuous>      RECENT LABS/IMAGING                        11.0   16.98 )-----------( 188      ( 08 Mar 2024 05:57 )             33.3     03-08    127<L>  |  93<L>  |  22<H>  ----------------------------<  135<H>  3.6   |  26  |  0.8    Ca    7.6<L>      08 Mar 2024 09:10  Mg     2.3     03-08    TPro  5.9<L>  /  Alb  3.7  /  TBili  0.7  /  DBili  x   /  AST  27  /  ALT  14  /  AlkPhos  53  03-07    PT/INR - ( 07 Mar 2024 06:09 )   PT: 13.00 sec;   INR: 1.14 ratio         PTT - ( 07 Mar 2024 06:09 )  PTT:32.1 sec  Urinalysis Basic - ( 08 Mar 2024 11:40 )    Color: Dark Yellow / Appearance: Clear / SG: >1.030 / pH: x  Gluc: x / Ketone: Negative mg/dL  / Bili: Negative / Urobili: 1.0 mg/dL   Blood: x / Protein: Trace mg/dL / Nitrite: Negative   Leuk Esterase: Negative / RBC: x / WBC x   Sq Epi: x / Non Sq Epi: x / Bacteria: x

## 2024-03-08 NOTE — OCCUPATIONAL THERAPY INITIAL EVALUATION ADULT - SITTING BALANCE: DYNAMIC
fair plus
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

## 2024-03-08 NOTE — OCCUPATIONAL THERAPY INITIAL EVALUATION ADULT - SHORT TERM MEMORY, REHAB EVAL
pt recalled 0/3. pt reports memory has been declining and patient and family aware. Pt states she has made appointments with "specialists" to assess memory ./impaired

## 2024-03-08 NOTE — PROGRESS NOTE ADULT - SUBJECTIVE AND OBJECTIVE BOX
NIDIACAT RILEY  [facility] 011 A ([facility])    ***My note supersedes ALL resident notes that I sign.  My corrections for their notes are in my note.***    Patient is a 88y old  Female who presents with a chief complaint of L femoral hip fracture s/p mechanical fall (08 Mar 2024 09:02)        Interval events:   Patient seen and examined at bedside. No acute events overnight. This morning sodium much lower, repeat confirmed Na 127. She is asymptomatic. Pain controlled.     -PMHx: HTN (hypertension)    Hypercholesteremia    Heavy cigarette smoker      -PSHx:History of laparoscopic cholecystectomy    H/O: hysterectomy    History of surgery            REVIEW OF SYSTEMS:  CONSTITUTIONAL: No fever, weight loss, or fatigue  RESPIRATORY: No cough, wheezing, chills or hemoptysis; No shortness of breath  CARDIOVASCULAR: No chest pain, palpitations, dizziness, or leg swelling  GASTROINTESTINAL: No abdominal or epigastric pain. No nausea, vomiting, or hematemesis; No diarrhea or constipation. No melena or hematochezia.  NEUROLOGICAL: No headaches  LYMPH NODES: No enlarged glands  MUSCULOSKELETAL: No joint pain or swelling; No muscle, back, or extremity pain      T(C): , Max: 37.2 (03-08-24 @ 04:46)  HR: 88 (03-08-24 @ 08:44)  BP: 135/56 (03-08-24 @ 08:44)  RR: 18 (03-08-24 @ 08:44)  SpO2: 95% (03-08-24 @ 08:44)  CAPILLARY BLOOD GLUCOSE        PHYSICAL EXAM:  GENERAL: NAD, lying in bed comfortably, thin elderly female   HEAD:  Atraumatic, Normocephalic  EYES: EOMI, PERRLA, conjunctiva and sclera clear   ENT: dry mucous membranes  NECK: Supple, No JVD  CHEST/LUNG: Clear to auscultation bilaterally; No rales, rhonchi, wheezing, or rubs. Unlabored respirations  HEART: Regular rate and rhythm; right sided systolic murmur radiating to the carotids.   ABDOMEN: Bowel sounds present; Soft, Nontender, Nondistended. No hepatomegaly  EXTREMITIES:  2+ Peripheral Pulses, brisk capillary refill. No clubbing, cyanosis, or edema; left hip dressing c/d/i   NERVOUS SYSTEM:  Alert & Oriented X3, speech clear. No deficits     Consultant(s) Notes Reviewed:  [x ] YES  [ ] NO  Care Discussed with Consultants/Other Providers [ x] YES  [ ] NO      LABS:          11.0  16.98  )-------(188          33.3  N=76.8  L=9.0  MCV=86.3    127<L>|93<L>|22<H>  ------------------<135<H>  3.6|26|0.8  eGFR:--  Ca:7.6<L>  126<L>|92<L>|24<H>  ------------------<119<H>  3.5|22|0.7  eGFR:--  Ca:7.6<L>      PT/INR - ( 07 Mar 2024 06:09 )   PT: 13.00 sec;   INR: 1.14 ratio         PTT - ( 07 Mar 2024 06:09 )  PTT:32.1 sec      Microbiology:      RADIOLOGY & ADDITIONAL TESTS:        Medications:  acetaminophen     Tablet .. 650 milliGRAM(s) Oral every 6 hours PRN  amLODIPine   Tablet 10 milliGRAM(s) Oral daily  aspirin  chewable 81 milliGRAM(s) Oral daily  atorvastatin 40 milliGRAM(s) Oral at bedtime  donepezil 10 milliGRAM(s) Oral at bedtime  heparin   Injectable 5000 Unit(s) SubCutaneous every 12 hours  hydrochlorothiazide 12.5 milliGRAM(s) Oral daily  HYDROmorphone  Injectable 1 milliGRAM(s) IV Push every 4 hours PRN  metoprolol tartrate 12.5 milliGRAM(s) Oral daily  oxyCODONE    IR 5 milliGRAM(s) Oral every 6 hours PRN  polyethylene glycol 3350 17 Gram(s) Oral two times a day  senna 2 Tablet(s) Oral at bedtime  sodium chloride 0.9%. 1000 milliLiter(s) IV Continuous <Continuous>

## 2024-03-08 NOTE — PROGRESS NOTE ADULT - ASSESSMENT
Imp: Rehab of left hip impacted femoral neck fx, s/p hemiarthroplasty / s/p fall / HTN, HLD, and dementia\          Prior to hospitalization she  was independent in all activities. Currently she  needs assist with all ADLs and ambulate short distance with walker with assist. She can tolerate 3hr/day PT/OT and medically necessary. She needs acute inpatient rehab to improve functions for safe return home. Pt also needs physiatrist to monitor her medical status and functional progress during her rehab stay. She warrants acute inpatient rehab.     Plan: continue bedside therapy as tolerated          Good 4a acute inpatient rehab candidate once medically cleared

## 2024-03-08 NOTE — PROGRESS NOTE ADULT - SUBJECTIVE AND OBJECTIVE BOX
POD#2 S/P HEMIARTHROPLASTY  T(C): 37 (03-08-24 @ 08:44), Max: 37.2 (03-08-24 @ 04:46)  HR: 88 (03-08-24 @ 08:44) (78 - 95)  BP: 135/56 (03-08-24 @ 08:44) (114/69 - 138/59)  RR: 18 (03-08-24 @ 08:44) (18 - 18)  SpO2: 95% (03-08-24 @ 08:44) (92% - 99%)                        11.0   16.98 )-----------( 188      ( 08 Mar 2024 05:57 )             33.3     03-08    126<L>  |  92<L>  |  24<H>  ----------------------------<  119<H>  3.5   |  22  |  0.7    Ca    7.6<L>      08 Mar 2024 05:57  Mg     2.3     03-08    TPro  5.9<L>  /  Alb  3.7  /  TBili  0.7  /  DBili  x   /  AST  27  /  ALT  14  /  AlkPhos  53  03-07    PT/INR - ( 07 Mar 2024 06:09 )   PT: 13.00 sec;   INR: 1.14 ratio         PTT - ( 07 Mar 2024 06:09 )  PTT:32.1 sec  PTS PAIN IS CONTROLLED   WOUND dressing changed new aquacel placed  suture line cdi   N/V/I  ABX COMPLETE  DVT PROPHYLAXIS IN PLACE  REHAB IN PROGRESS  D/C PLAN PENDING 4A is a possibility      POD#2 S/P HEMIARTHROPLASTY  T(C): 37 (03-08-24 @ 08:44), Max: 37.2 (03-08-24 @ 04:46)  HR: 88 (03-08-24 @ 08:44) (78 - 95)  BP: 135/56 (03-08-24 @ 08:44) (114/69 - 138/59)  RR: 18 (03-08-24 @ 08:44) (18 - 18)  SpO2: 95% (03-08-24 @ 08:44) (92% - 99%)                        11.0   16.98 )-----------( 188      ( 08 Mar 2024 05:57 )             33.3     03-08    126<L>  |  92<L>  |  24<H>  ----------------------------<  119<H>  3.5   |  22  |  0.7    Ca    7.6<L>      08 Mar 2024 05:57  Mg     2.3     03-08    TPro  5.9<L>  /  Alb  3.7  /  TBili  0.7  /  DBili  x   /  AST  27  /  ALT  14  /  AlkPhos  53  03-07    PT/INR - ( 07 Mar 2024 06:09 )   PT: 13.00 sec;   INR: 1.14 ratio         PTT - ( 07 Mar 2024 06:09 )  PTT:32.1 sec  PTS PAIN IS CONTROLLED   WOUND dressing changed new aquacel placed  suture line cdi   N/V/I  ABX COMPLETE  DVT PROPHYLAXIS IN PLACE  REHAB IN PROGRESS  D/C PLAN PENDING 4A is a possibility   stable seen at bedside with team   daughter present  expected pain  seen by rehab  expect 4A when bed available

## 2024-03-09 LAB
ALBUMIN SERPL ELPH-MCNC: 3 G/DL — LOW (ref 3.5–5.2)
ALP SERPL-CCNC: 60 U/L — SIGNIFICANT CHANGE UP (ref 30–115)
ALT FLD-CCNC: 10 U/L — SIGNIFICANT CHANGE UP (ref 0–41)
ANION GAP SERPL CALC-SCNC: 10 MMOL/L — SIGNIFICANT CHANGE UP (ref 7–14)
ANION GAP SERPL CALC-SCNC: 10 MMOL/L — SIGNIFICANT CHANGE UP (ref 7–14)
ANION GAP SERPL CALC-SCNC: 11 MMOL/L — SIGNIFICANT CHANGE UP (ref 7–14)
ANION GAP SERPL CALC-SCNC: 13 MMOL/L — SIGNIFICANT CHANGE UP (ref 7–14)
ANION GAP SERPL CALC-SCNC: 8 MMOL/L — SIGNIFICANT CHANGE UP (ref 7–14)
AST SERPL-CCNC: 20 U/L — SIGNIFICANT CHANGE UP (ref 0–41)
BASOPHILS # BLD AUTO: 0.07 K/UL — SIGNIFICANT CHANGE UP (ref 0–0.2)
BASOPHILS NFR BLD AUTO: 0.5 % — SIGNIFICANT CHANGE UP (ref 0–1)
BILIRUB SERPL-MCNC: 0.9 MG/DL — SIGNIFICANT CHANGE UP (ref 0.2–1.2)
BUN SERPL-MCNC: 18 MG/DL — SIGNIFICANT CHANGE UP (ref 10–20)
BUN SERPL-MCNC: 20 MG/DL — SIGNIFICANT CHANGE UP (ref 10–20)
BUN SERPL-MCNC: 20 MG/DL — SIGNIFICANT CHANGE UP (ref 10–20)
BUN SERPL-MCNC: 21 MG/DL — HIGH (ref 10–20)
CALCIUM SERPL-MCNC: 7.5 MG/DL — LOW (ref 8.4–10.5)
CALCIUM SERPL-MCNC: 7.6 MG/DL — LOW (ref 8.4–10.4)
CALCIUM SERPL-MCNC: 7.6 MG/DL — LOW (ref 8.4–10.5)
CALCIUM SERPL-MCNC: 7.9 MG/DL — LOW (ref 8.4–10.4)
CHLORIDE SERPL-SCNC: 95 MMOL/L — LOW (ref 98–110)
CHLORIDE SERPL-SCNC: 96 MMOL/L — LOW (ref 98–110)
CHLORIDE SERPL-SCNC: 96 MMOL/L — LOW (ref 98–110)
CHLORIDE SERPL-SCNC: 97 MMOL/L — LOW (ref 98–110)
CHLORIDE SERPL-SCNC: 98 MMOL/L — SIGNIFICANT CHANGE UP (ref 98–110)
CO2 SERPL-SCNC: 25 MMOL/L — SIGNIFICANT CHANGE UP (ref 17–32)
CO2 SERPL-SCNC: 25 MMOL/L — SIGNIFICANT CHANGE UP (ref 17–32)
CO2 SERPL-SCNC: 26 MMOL/L — SIGNIFICANT CHANGE UP (ref 17–32)
CO2 SERPL-SCNC: 28 MMOL/L — SIGNIFICANT CHANGE UP (ref 17–32)
CREAT SERPL-MCNC: 0.6 MG/DL — LOW (ref 0.7–1.5)
CREAT SERPL-MCNC: 0.7 MG/DL — SIGNIFICANT CHANGE UP (ref 0.7–1.5)
EGFR: 84 ML/MIN/1.73M2 — SIGNIFICANT CHANGE UP
EGFR: 87 ML/MIN/1.73M2 — SIGNIFICANT CHANGE UP
EOSINOPHIL # BLD AUTO: 0.09 K/UL — SIGNIFICANT CHANGE UP (ref 0–0.7)
EOSINOPHIL NFR BLD AUTO: 0.6 % — SIGNIFICANT CHANGE UP (ref 0–8)
GLUCOSE SERPL-MCNC: 120 MG/DL — HIGH (ref 70–99)
GLUCOSE SERPL-MCNC: 126 MG/DL — HIGH (ref 70–99)
GLUCOSE SERPL-MCNC: 133 MG/DL — HIGH (ref 70–99)
GLUCOSE SERPL-MCNC: 152 MG/DL — HIGH (ref 70–99)
HCT VFR BLD CALC: 32.1 % — LOW (ref 37–47)
HGB BLD-MCNC: 10.9 G/DL — LOW (ref 12–16)
IMM GRANULOCYTES NFR BLD AUTO: 1 % — HIGH (ref 0.1–0.3)
LYMPHOCYTES # BLD AUTO: 1.57 K/UL — SIGNIFICANT CHANGE UP (ref 1.2–3.4)
LYMPHOCYTES # BLD AUTO: 10.6 % — LOW (ref 20.5–51.1)
MAGNESIUM SERPL-MCNC: 2.1 MG/DL — SIGNIFICANT CHANGE UP (ref 1.8–2.4)
MCHC RBC-ENTMCNC: 29 PG — SIGNIFICANT CHANGE UP (ref 27–31)
MCHC RBC-ENTMCNC: 34 G/DL — SIGNIFICANT CHANGE UP (ref 32–37)
MCV RBC AUTO: 85.4 FL — SIGNIFICANT CHANGE UP (ref 81–99)
MONOCYTES # BLD AUTO: 1.53 K/UL — HIGH (ref 0.1–0.6)
MONOCYTES NFR BLD AUTO: 10.3 % — HIGH (ref 1.7–9.3)
NEUTROPHILS # BLD AUTO: 11.45 K/UL — HIGH (ref 1.4–6.5)
NEUTROPHILS NFR BLD AUTO: 77 % — HIGH (ref 42.2–75.2)
NRBC # BLD: 0 /100 WBCS — SIGNIFICANT CHANGE UP (ref 0–0)
PLATELET # BLD AUTO: 221 K/UL — SIGNIFICANT CHANGE UP (ref 130–400)
PMV BLD: 9.9 FL — SIGNIFICANT CHANGE UP (ref 7.4–10.4)
POTASSIUM SERPL-MCNC: 3.6 MMOL/L — SIGNIFICANT CHANGE UP (ref 3.5–5)
POTASSIUM SERPL-MCNC: 3.8 MMOL/L — SIGNIFICANT CHANGE UP (ref 3.5–5)
POTASSIUM SERPL-MCNC: 3.8 MMOL/L — SIGNIFICANT CHANGE UP (ref 3.5–5)
POTASSIUM SERPL-MCNC: 3.9 MMOL/L — SIGNIFICANT CHANGE UP (ref 3.5–5)
POTASSIUM SERPL-MCNC: 4.6 MMOL/L — SIGNIFICANT CHANGE UP (ref 3.5–5)
POTASSIUM SERPL-SCNC: 3.6 MMOL/L — SIGNIFICANT CHANGE UP (ref 3.5–5)
POTASSIUM SERPL-SCNC: 3.8 MMOL/L — SIGNIFICANT CHANGE UP (ref 3.5–5)
POTASSIUM SERPL-SCNC: 3.8 MMOL/L — SIGNIFICANT CHANGE UP (ref 3.5–5)
POTASSIUM SERPL-SCNC: 3.9 MMOL/L — SIGNIFICANT CHANGE UP (ref 3.5–5)
POTASSIUM SERPL-SCNC: 4.6 MMOL/L — SIGNIFICANT CHANGE UP (ref 3.5–5)
PROT SERPL-MCNC: 5.2 G/DL — LOW (ref 6–8)
RBC # BLD: 3.76 M/UL — LOW (ref 4.2–5.4)
RBC # FLD: 15 % — HIGH (ref 11.5–14.5)
SODIUM SERPL-SCNC: 130 MMOL/L — LOW (ref 135–146)
SODIUM SERPL-SCNC: 131 MMOL/L — LOW (ref 135–146)
SODIUM SERPL-SCNC: 132 MMOL/L — LOW (ref 135–146)
SODIUM SERPL-SCNC: 133 MMOL/L — LOW (ref 135–146)
SODIUM SERPL-SCNC: 134 MMOL/L — LOW (ref 135–146)
WBC # BLD: 14.86 K/UL — HIGH (ref 4.8–10.8)
WBC # FLD AUTO: 14.86 K/UL — HIGH (ref 4.8–10.8)

## 2024-03-09 PROCEDURE — 99233 SBSQ HOSP IP/OBS HIGH 50: CPT

## 2024-03-09 RX ORDER — BUDESONIDE AND FORMOTEROL FUMARATE DIHYDRATE 160; 4.5 UG/1; UG/1
2 AEROSOL RESPIRATORY (INHALATION)
Refills: 0 | Status: DISCONTINUED | OUTPATIENT
Start: 2024-03-09 | End: 2024-03-12

## 2024-03-09 RX ADMIN — HYDROMORPHONE HYDROCHLORIDE 1 MILLIGRAM(S): 2 INJECTION INTRAMUSCULAR; INTRAVENOUS; SUBCUTANEOUS at 08:33

## 2024-03-09 RX ADMIN — POLYETHYLENE GLYCOL 3350 17 GRAM(S): 17 POWDER, FOR SOLUTION ORAL at 18:34

## 2024-03-09 RX ADMIN — SENNA PLUS 2 TABLET(S): 8.6 TABLET ORAL at 21:24

## 2024-03-09 RX ADMIN — ATORVASTATIN CALCIUM 40 MILLIGRAM(S): 80 TABLET, FILM COATED ORAL at 21:23

## 2024-03-09 RX ADMIN — DONEPEZIL HYDROCHLORIDE 10 MILLIGRAM(S): 10 TABLET, FILM COATED ORAL at 21:23

## 2024-03-09 RX ADMIN — AMLODIPINE BESYLATE 10 MILLIGRAM(S): 2.5 TABLET ORAL at 05:21

## 2024-03-09 RX ADMIN — Medication 3 MILLILITER(S): at 20:28

## 2024-03-09 RX ADMIN — OXYCODONE HYDROCHLORIDE 5 MILLIGRAM(S): 5 TABLET ORAL at 10:16

## 2024-03-09 RX ADMIN — HEPARIN SODIUM 5000 UNIT(S): 5000 INJECTION INTRAVENOUS; SUBCUTANEOUS at 21:23

## 2024-03-09 RX ADMIN — HYDROMORPHONE HYDROCHLORIDE 1 MILLIGRAM(S): 2 INJECTION INTRAMUSCULAR; INTRAVENOUS; SUBCUTANEOUS at 08:03

## 2024-03-09 RX ADMIN — OXYCODONE HYDROCHLORIDE 5 MILLIGRAM(S): 5 TABLET ORAL at 09:46

## 2024-03-09 RX ADMIN — Medication 81 MILLIGRAM(S): at 11:48

## 2024-03-09 RX ADMIN — POLYETHYLENE GLYCOL 3350 17 GRAM(S): 17 POWDER, FOR SOLUTION ORAL at 05:21

## 2024-03-09 RX ADMIN — Medication 3 MILLILITER(S): at 13:27

## 2024-03-09 RX ADMIN — SODIUM CHLORIDE 50 MILLILITER(S): 9 INJECTION INTRAMUSCULAR; INTRAVENOUS; SUBCUTANEOUS at 23:10

## 2024-03-09 RX ADMIN — HYDROMORPHONE HYDROCHLORIDE 1 MILLIGRAM(S): 2 INJECTION INTRAMUSCULAR; INTRAVENOUS; SUBCUTANEOUS at 21:53

## 2024-03-09 RX ADMIN — BUDESONIDE AND FORMOTEROL FUMARATE DIHYDRATE 2 PUFF(S): 160; 4.5 AEROSOL RESPIRATORY (INHALATION) at 21:24

## 2024-03-09 RX ADMIN — HYDROMORPHONE HYDROCHLORIDE 1 MILLIGRAM(S): 2 INJECTION INTRAMUSCULAR; INTRAVENOUS; SUBCUTANEOUS at 21:23

## 2024-03-09 RX ADMIN — Medication 12.5 MILLIGRAM(S): at 05:21

## 2024-03-09 RX ADMIN — Medication 3 MILLILITER(S): at 08:15

## 2024-03-09 RX ADMIN — HEPARIN SODIUM 5000 UNIT(S): 5000 INJECTION INTRAVENOUS; SUBCUTANEOUS at 11:48

## 2024-03-09 RX ADMIN — SODIUM CHLORIDE 50 MILLILITER(S): 9 INJECTION INTRAMUSCULAR; INTRAVENOUS; SUBCUTANEOUS at 08:03

## 2024-03-09 NOTE — PROVIDER CONTACT NOTE (OTHER) - ACTION/TREATMENT ORDERED:
Start propranolol  Reassess 1 month  
Time to recheck  May not need since not using laxatives as much as before  
MD made aware will order one time done of losartan

## 2024-03-09 NOTE — PROGRESS NOTE ADULT - ASSESSMENT
89 yo F w/PMH of HTN, HLD, and dementia who presented to the ED with L hip pain and deformity after tripping on the stairs, falling to her left side, and being unable to get on her own now admitted to medicine for management of L femoral hip fracture pending L hip hemiarthroplasty by orthopedic surgery.    #Acute hypoxic respiratory failure  -however suspect chronic respiratory failure due to underlying undiagnosed COPD  -saturating well on 1.5L NC at rest  -f/u CXR   -no wheezing  -added Symbicort, albuterol prn  -o/p pulm eval; 60 year smoking history   -wean o2 as tolerated  -IS 10x/hr     #Mechanical fall, no syncope  #L femoral hip fracture s/p hemiarthroplasty 3/6/24   -PT/rehab- poss 4a candidate?  -pain control  - Bowel reg: Senna and miralax   -dvt ppx: heparin 5000u sq q12h     #Acute hyponatremia- improved   -patient observed to be drinking a lot of water   -went from 140 on 3/7 to 127 on 3/8; today 134    -patient asymptomatic  -patient's mucous membranes moist now   -hypoosmolar  -c/w NS at 50cc/hr for today then likely stop tomorrow; trend bmp  -restrict free water; encourage PO intake   -d/c'd HCTZ    #HTN  - c/w home amlodipine 10 mg daily; holding HCTZ  -now on lopressor 12.5mg po q12h   -if BP rises can try ACE or ARB     #Moderate-severe AS  -o/p cardio   -keep euvolemic   -c/w beta blocker and baby aspirin    #Hypomagnesemia   -replete to >2.0      #HLD  - c/w home lipitor 40 mg nightly    #Dementia  -Patient and daughter report worsening forgetfulness lately  - Patient reports she has an appointment with a neurologist outpatient  - c/w home donepezil 10 mg daily    #Progress Note Handoff  Pending (specify): pt/rehab, pain control, monitor Na closely, wean O2, f/u CXR   Family discussion: Updated daughter Matilda at bedside, answered all questions.   Disposition: SNF vs 4a    High risk given above acuity and comorbidities.

## 2024-03-09 NOTE — PROGRESS NOTE ADULT - SUBJECTIVE AND OBJECTIVE BOX
ORTHOPAEDIC SURGERY PROGRESS NOTE    Interval History:  Patient seen and examined at bedside.  Pain is controlled.  No complaints of chest pain, SOB, N/V.    MEDICATIONS  (STANDING):  albuterol/ipratropium for Nebulization 3 milliLiter(s) Nebulizer every 6 hours  amLODIPine   Tablet 10 milliGRAM(s) Oral daily  aspirin  chewable 81 milliGRAM(s) Oral daily  atorvastatin 40 milliGRAM(s) Oral at bedtime  budesonide  80 MICROgram(s)/formoterol 4.5 MICROgram(s) Inhaler 2 Puff(s) Inhalation every 12 hours  donepezil 10 milliGRAM(s) Oral at bedtime  heparin   Injectable 5000 Unit(s) SubCutaneous every 12 hours  metoprolol tartrate 12.5 milliGRAM(s) Oral daily  polyethylene glycol 3350 17 Gram(s) Oral two times a day  senna 2 Tablet(s) Oral at bedtime  sodium chloride 0.9%. 1000 milliLiter(s) (50 mL/Hr) IV Continuous <Continuous>    MEDICATIONS  (PRN):  acetaminophen     Tablet .. 650 milliGRAM(s) Oral every 6 hours PRN Mild Pain (1 - 3)  albuterol    90 MICROgram(s) HFA Inhaler 2 Puff(s) Inhalation every 6 hours PRN Shortness of Breath and/or Wheezing  HYDROmorphone  Injectable 1 milliGRAM(s) IV Push every 4 hours PRN Severe Pain (7 - 10)  oxyCODONE    IR 5 milliGRAM(s) Oral every 6 hours PRN Moderate Pain (4 - 6)      Vital Signs Last 24 Hrs  T(C): 36 (09 Mar 2024 04:52), Max: 37 (08 Mar 2024 08:44)  T(F): 96.8 (09 Mar 2024 04:52), Max: 98.6 (08 Mar 2024 08:44)  HR: 91 (09 Mar 2024 04:52) (83 - 98)  BP: 146/67 (09 Mar 2024 04:52) (127/58 - 150/66)  BP(mean): --  RR: 18 (09 Mar 2024 04:52) (18 - 18)  SpO2: 95% (09 Mar 2024 04:52) (90% - 97%)    Physical Exam:   Dressing C/D/I  Compartments soft and compressible  Motor intact distally  SILT distally  CR<2sec, palpable pulses                           11.0   16.98 )-----------( 188      ( 08 Mar 2024 05:57 )             33.3     03-09    132<L>  |  96<L>  |  20  ----------------------------<  120<H>  3.6   |  28  |  0.7    Ca    7.6<L>      09 Mar 2024 00:50  Mg     2.3     03-08          A/P: 88yFemale s/p L hip hemiarthroplasty for femoral neck fx 3/6/2024.     - OOB to Chair   - WBAT  - PT/OT  - Pain control   - Extremity icing/elevation  - Incentive Spirometry   - DVT Prophylaxis   - Discharge planning    - If discharged, patient should follow up with Dr. Sebastian at [address] [place] [address]. Phone number [phone] for scheduling/appointment.  - Patient should be discharged on 6 weeks (from surgery date) of appropriate DVT prophylaxis due to their decreased functional/ambulation status after lower extremity surgery. Orthopaedic preference would be Aspirin 81 mg BID  if there are no contraindications. If patient is already on home anticoagulation, they may continue home anticoagulation medication instead of aspirin. If patient is going to inpatient rehab/nursing facility, and they plan for DVT PPx with SQH/LVX, they may be placed on that instead of aspirin.

## 2024-03-09 NOTE — PROGRESS NOTE ADULT - SUBJECTIVE AND OBJECTIVE BOX
JESSE JACOBENCE  [facility] 011 A ([facility])    ***My note supersedes ALL resident notes that I sign.  My corrections for their notes are in my note.***    Patient is a 88y old  Female who presents with a chief complaint of L femoral hip fracture s/p mechanical fall (09 Mar 2024 07:04)        Interval events:   Patient seen and examined at bedside. Looks much less dry today. Na improving. Worked with PT. Still having a lot of pain in left hip. No fevers. Had to be put back on NC last night due to desaturations but patient denies SOB, cough, chest pain.     -PMHx: HTN (hypertension)    Hypercholesteremia    Heavy cigarette smoker      -PSHx:History of laparoscopic cholecystectomy    H/O: hysterectomy    History of surgery            REVIEW OF SYSTEMS:  CONSTITUTIONAL: No fever, weight loss, or fatigue  RESPIRATORY: as above   CARDIOVASCULAR: No chest pain, palpitations, dizziness, or leg swelling  GASTROINTESTINAL: No abdominal or epigastric pain. No nausea, vomiting, or hematemesis; No diarrhea or constipation. No melena or hematochezia.  NEUROLOGICAL: No headaches  LYMPH NODES: No enlarged glands  MUSCULOSKELETAL: as above       T(C): , Max: 36.9 (03-08-24 @ 16:05)  HR: 67 (03-09-24 @ 09:24)  BP: 101/54 (03-09-24 @ 09:24)  RR: 18 (03-09-24 @ 09:24)  SpO2: 96% (03-09-24 @ 09:24)  CAPILLARY BLOOD GLUCOSE          PHYSICAL EXAM:  GENERAL: NAD, lying in bed comfortably, thin elderly female on 1.5L NC   HEAD:  Atraumatic, Normocephalic  EYES: EOMI, PERRLA, conjunctiva and sclera clear   ENT: moist mucous membranes  NECK: Supple, No JVD  CHEST/LUNG: Clear to auscultation bilaterally; No rales, rhonchi, wheezing, or rubs. Unlabored respirations  HEART: Regular rate and rhythm; right sided systolic murmur radiating to the carotids.   ABDOMEN: Bowel sounds present; Soft, Nontender, Nondistended. No hepatomegaly  EXTREMITIES:  2+ Peripheral Pulses, brisk capillary refill. No clubbing, cyanosis, or edema; left hip dressing c/d/i   NERVOUS SYSTEM:  Alert & Oriented X3, speech clear. No deficits     Consultant(s) Notes Reviewed:  [x ] YES  [ ] NO  Care Discussed with Consultants/Other Providers [ x] YES  [ ] NO      LABS:          10.9  14.86  )-------(221          32.1  N=77.0  L=10.6  MCV=85.4    134<L>|98|21<H>  ------------------<152<H>  3.8|25|0.6<L>  eGFR:--  Ca:7.5<L>  130<L>|95<L>|18  ------------------<126<H>  3.9|25|0.6<L>  eGFR:--  Ca:7.6<L>  132<L>|96<L>|20  ------------------<120<H>  3.6|28|0.7  eGFR:--  Ca:7.6<L>  131<L>|96<L>|22<H>  ------------------<100<H>  4.6|22|0.8  eGFR:--   Ca:7.5<L>  129<L>|92<L>|24<H>  ------------------<139<H>  3.7|26|0.7  eGFR:--  Ca:7.7<L>            Microbiology:      RADIOLOGY & ADDITIONAL TESTS:        Medications:  acetaminophen     Tablet .. 650 milliGRAM(s) Oral every 6 hours PRN  albuterol    90 MICROgram(s) HFA Inhaler 2 Puff(s) Inhalation every 6 hours PRN  albuterol/ipratropium for Nebulization 3 milliLiter(s) Nebulizer every 6 hours  amLODIPine   Tablet 10 milliGRAM(s) Oral daily  aspirin  chewable 81 milliGRAM(s) Oral daily  atorvastatin 40 milliGRAM(s) Oral at bedtime  budesonide 160 MICROgram(s)/formoterol 4.5 MICROgram(s) Inhaler 2 Puff(s) Inhalation two times a day  donepezil 10 milliGRAM(s) Oral at bedtime  heparin   Injectable 5000 Unit(s) SubCutaneous every 12 hours  HYDROmorphone  Injectable 1 milliGRAM(s) IV Push every 4 hours PRN  metoprolol tartrate 12.5 milliGRAM(s) Oral daily  oxyCODONE    IR 5 milliGRAM(s) Oral every 6 hours PRN  polyethylene glycol 3350 17 Gram(s) Oral two times a day  senna 2 Tablet(s) Oral at bedtime  sodium chloride 0.9%. 1000 milliLiter(s) IV Continuous <Continuous>

## 2024-03-10 LAB
ALBUMIN SERPL ELPH-MCNC: 2.9 G/DL — LOW (ref 3.5–5.2)
ALP SERPL-CCNC: 68 U/L — SIGNIFICANT CHANGE UP (ref 30–115)
ALT FLD-CCNC: 16 U/L — SIGNIFICANT CHANGE UP (ref 0–41)
ANION GAP SERPL CALC-SCNC: 10 MMOL/L — SIGNIFICANT CHANGE UP (ref 7–14)
AST SERPL-CCNC: 21 U/L — SIGNIFICANT CHANGE UP (ref 0–41)
BASOPHILS # BLD AUTO: 0.07 K/UL — SIGNIFICANT CHANGE UP (ref 0–0.2)
BASOPHILS NFR BLD AUTO: 0.6 % — SIGNIFICANT CHANGE UP (ref 0–1)
BILIRUB SERPL-MCNC: 1.2 MG/DL — SIGNIFICANT CHANGE UP (ref 0.2–1.2)
BUN SERPL-MCNC: 16 MG/DL — SIGNIFICANT CHANGE UP (ref 10–20)
CALCIUM SERPL-MCNC: 7.7 MG/DL — LOW (ref 8.4–10.4)
CHLORIDE SERPL-SCNC: 99 MMOL/L — SIGNIFICANT CHANGE UP (ref 98–110)
CO2 SERPL-SCNC: 27 MMOL/L — SIGNIFICANT CHANGE UP (ref 17–32)
CREAT SERPL-MCNC: 0.6 MG/DL — LOW (ref 0.7–1.5)
EGFR: 87 ML/MIN/1.73M2 — SIGNIFICANT CHANGE UP
EOSINOPHIL # BLD AUTO: 0.07 K/UL — SIGNIFICANT CHANGE UP (ref 0–0.7)
EOSINOPHIL NFR BLD AUTO: 0.6 % — SIGNIFICANT CHANGE UP (ref 0–8)
GLUCOSE SERPL-MCNC: 127 MG/DL — HIGH (ref 70–99)
HCT VFR BLD CALC: 32.3 % — LOW (ref 37–47)
HGB BLD-MCNC: 10.6 G/DL — LOW (ref 12–16)
IMM GRANULOCYTES NFR BLD AUTO: 0.7 % — HIGH (ref 0.1–0.3)
LYMPHOCYTES # BLD AUTO: 1.72 K/UL — SIGNIFICANT CHANGE UP (ref 1.2–3.4)
LYMPHOCYTES # BLD AUTO: 14.7 % — LOW (ref 20.5–51.1)
MAGNESIUM SERPL-MCNC: 2 MG/DL — SIGNIFICANT CHANGE UP (ref 1.8–2.4)
MCHC RBC-ENTMCNC: 28.5 PG — SIGNIFICANT CHANGE UP (ref 27–31)
MCHC RBC-ENTMCNC: 32.8 G/DL — SIGNIFICANT CHANGE UP (ref 32–37)
MCV RBC AUTO: 86.8 FL — SIGNIFICANT CHANGE UP (ref 81–99)
MONOCYTES # BLD AUTO: 1.58 K/UL — HIGH (ref 0.1–0.6)
MONOCYTES NFR BLD AUTO: 13.5 % — HIGH (ref 1.7–9.3)
NEUTROPHILS # BLD AUTO: 8.18 K/UL — HIGH (ref 1.4–6.5)
NEUTROPHILS NFR BLD AUTO: 69.9 % — SIGNIFICANT CHANGE UP (ref 42.2–75.2)
NRBC # BLD: 0 /100 WBCS — SIGNIFICANT CHANGE UP (ref 0–0)
PLATELET # BLD AUTO: 248 K/UL — SIGNIFICANT CHANGE UP (ref 130–400)
PMV BLD: 10.2 FL — SIGNIFICANT CHANGE UP (ref 7.4–10.4)
POTASSIUM SERPL-MCNC: 3.7 MMOL/L — SIGNIFICANT CHANGE UP (ref 3.5–5)
POTASSIUM SERPL-SCNC: 3.7 MMOL/L — SIGNIFICANT CHANGE UP (ref 3.5–5)
PROT SERPL-MCNC: 5 G/DL — LOW (ref 6–8)
RBC # BLD: 3.72 M/UL — LOW (ref 4.2–5.4)
RBC # FLD: 15.1 % — HIGH (ref 11.5–14.5)
SARS-COV-2 RNA SPEC QL NAA+PROBE: SIGNIFICANT CHANGE UP
SODIUM SERPL-SCNC: 136 MMOL/L — SIGNIFICANT CHANGE UP (ref 135–146)
WBC # BLD: 11.7 K/UL — HIGH (ref 4.8–10.8)
WBC # FLD AUTO: 11.7 K/UL — HIGH (ref 4.8–10.8)

## 2024-03-10 PROCEDURE — 99233 SBSQ HOSP IP/OBS HIGH 50: CPT

## 2024-03-10 RX ORDER — LOSARTAN POTASSIUM 100 MG/1
25 TABLET, FILM COATED ORAL ONCE
Refills: 0 | Status: COMPLETED | OUTPATIENT
Start: 2024-03-10 | End: 2024-03-10

## 2024-03-10 RX ORDER — LANOLIN ALCOHOL/MO/W.PET/CERES
5 CREAM (GRAM) TOPICAL
Refills: 0 | Status: DISCONTINUED | OUTPATIENT
Start: 2024-03-10 | End: 2024-03-12

## 2024-03-10 RX ORDER — FUROSEMIDE 40 MG
40 TABLET ORAL ONCE
Refills: 0 | Status: COMPLETED | OUTPATIENT
Start: 2024-03-10 | End: 2024-03-10

## 2024-03-10 RX ADMIN — Medication 40 MILLIGRAM(S): at 09:42

## 2024-03-10 RX ADMIN — POLYETHYLENE GLYCOL 3350 17 GRAM(S): 17 POWDER, FOR SOLUTION ORAL at 05:13

## 2024-03-10 RX ADMIN — Medication 650 MILLIGRAM(S): at 16:31

## 2024-03-10 RX ADMIN — Medication 12.5 MILLIGRAM(S): at 05:13

## 2024-03-10 RX ADMIN — LOSARTAN POTASSIUM 25 MILLIGRAM(S): 100 TABLET, FILM COATED ORAL at 00:18

## 2024-03-10 RX ADMIN — HYDROMORPHONE HYDROCHLORIDE 1 MILLIGRAM(S): 2 INJECTION INTRAMUSCULAR; INTRAVENOUS; SUBCUTANEOUS at 17:15

## 2024-03-10 RX ADMIN — Medication 5 MILLIGRAM(S): at 21:31

## 2024-03-10 RX ADMIN — DONEPEZIL HYDROCHLORIDE 10 MILLIGRAM(S): 10 TABLET, FILM COATED ORAL at 21:31

## 2024-03-10 RX ADMIN — HEPARIN SODIUM 5000 UNIT(S): 5000 INJECTION INTRAVENOUS; SUBCUTANEOUS at 21:31

## 2024-03-10 RX ADMIN — BUDESONIDE AND FORMOTEROL FUMARATE DIHYDRATE 2 PUFF(S): 160; 4.5 AEROSOL RESPIRATORY (INHALATION) at 09:44

## 2024-03-10 RX ADMIN — HYDROMORPHONE HYDROCHLORIDE 1 MILLIGRAM(S): 2 INJECTION INTRAMUSCULAR; INTRAVENOUS; SUBCUTANEOUS at 16:45

## 2024-03-10 RX ADMIN — Medication 650 MILLIGRAM(S): at 16:01

## 2024-03-10 RX ADMIN — Medication 81 MILLIGRAM(S): at 11:33

## 2024-03-10 RX ADMIN — BUDESONIDE AND FORMOTEROL FUMARATE DIHYDRATE 2 PUFF(S): 160; 4.5 AEROSOL RESPIRATORY (INHALATION) at 21:31

## 2024-03-10 RX ADMIN — Medication 3 MILLILITER(S): at 13:43

## 2024-03-10 RX ADMIN — HYDROMORPHONE HYDROCHLORIDE 1 MILLIGRAM(S): 2 INJECTION INTRAMUSCULAR; INTRAVENOUS; SUBCUTANEOUS at 04:38

## 2024-03-10 RX ADMIN — Medication 3 MILLILITER(S): at 09:17

## 2024-03-10 RX ADMIN — AMLODIPINE BESYLATE 10 MILLIGRAM(S): 2.5 TABLET ORAL at 05:13

## 2024-03-10 RX ADMIN — HEPARIN SODIUM 5000 UNIT(S): 5000 INJECTION INTRAVENOUS; SUBCUTANEOUS at 09:42

## 2024-03-10 RX ADMIN — ATORVASTATIN CALCIUM 40 MILLIGRAM(S): 80 TABLET, FILM COATED ORAL at 21:31

## 2024-03-10 NOTE — PROGRESS NOTE ADULT - SUBJECTIVE AND OBJECTIVE BOX
NIDIA CAT  [facility] 011 A ([facility])    ***My note supersedes ALL resident notes that I sign.  My corrections for their notes are in my note.***    Patient is a 88y old  Female who presents with a chief complaint of L femoral hip fracture s/p mechanical fall (09 Mar 2024 13:46)        Interval events:   Patient seen and examined at bedside. No acute events overnight. Patient having BM now finally. Denies any SOB but remains on 2L NC. Says she still has a lot of pain in left leg.     -PMHx: HTN (hypertension)    Hypercholesteremia    Heavy cigarette smoker      -PSHx:History of laparoscopic cholecystectomy    H/O: hysterectomy    History of surgery            REVIEW OF SYSTEMS:  CONSTITUTIONAL: No fever, weight loss, or fatigue  RESPIRATORY: No cough, wheezing, chills or hemoptysis; No shortness of breath  CARDIOVASCULAR: No chest pain, palpitations, dizziness, or leg swelling  GASTROINTESTINAL: No abdominal or epigastric pain. No nausea, vomiting, or hematemesis; No diarrhea or constipation. No melena or hematochezia.  NEUROLOGICAL: No headaches  LYMPH NODES: No enlarged glands  MUSCULOSKELETAL: as above       T(C): , Max: 36.8 (03-10-24 @ 08:19)  HR: 69 (03-10-24 @ 08:19)  BP: 143/65 (03-10-24 @ 08:19)  RR: 18 (03-10-24 @ 08:19)  SpO2: 95% (03-10-24 @ 08:19)  CAPILLARY BLOOD GLUCOSE          PHYSICAL EXAM:   GENERAL: NAD, lying in bed comfortably, thin elderly female on 2L NC   HEAD:  Atraumatic, Normocephalic  EYES: EOMI, PERRLA, conjunctiva and sclera clear   ENT: moist mucous membranes   NECK: Supple, No JVD  CHEST/LUNG: Clear to auscultation bilaterally; dec b/s b/l lower lobes   HEART: Regular rate and rhythm; right sided systolic murmur radiating to the carotids.   ABDOMEN: Bowel sounds present; Soft, Nontender, Nondistended. No hepatomegaly  EXTREMITIES:  2+ Peripheral Pulses, brisk capillary refill. No clubbing, cyanosis, or edema; left hip dressing c/d/i   NERVOUS SYSTEM:  Alert & Oriented X3, speech clear. No deficits     Consultant(s) Notes Reviewed:  [x ] YES  [ ] NO   Care Discussed with Consultants/Other Providers [ x] YES  [ ] NO      LABS:          10.6  11.70  )-------(248          32.3  N=69.9  L=14.7  MCV=86.8    136|99|16  ------------------<127<H>  3.7|27|0.6<L>  eGFR:--  Ca:7.7<L>  133<L>|97<L>|20  ------------------<133<H>  3.8|26|0.6<L>  eGFR:--  Ca:7.9<L>  134<L>|98|21<H>  ------------------<152<H>  3.8|25|0.6<L>  eGFR:--  Ca:7.5<L>            Microbiology:      RADIOLOGY & ADDITIONAL TESTS:  < from: Xray Chest 1 View- PORTABLE-Routine (Xray Chest 1 View- PORTABLE-Routine .) (03.08.24 @ 19:10) >  Impression:    Bilateral opacifications, worsening    < end of copied text >    < from: TTE Echo Complete w/ Contrast w/ Doppler (03.06.24 @ 08:27) >    Summary:   1. Technically difficult study.   2. Hyperdynamic global left ventricular systolic function.   3. LV Ejection Fraction by Mejia's Method with a biplane EF of 75 %.   4. Spectral Doppler shows pseudonormal pattern of left ventricular   myocardial filling (Grade II diastolic dysfunction).   5. Mildly enlarged left atrium.   6. Normal right atrial size.   7. Degenerative mitral valve.   8. Moderate thickening and calcification of the anterior and posterior   mitral valve leaflets.   9. Severe mitral annular calcification.  10. Trace mitral valve regurgitation.  11. Mild tricuspid regurgitation.  12. Moderate to severe aortic valve stenosis. Peak/mean PG 71/40 mmHg,   DONTE 1.1 cm^2.  13. Estimated pulmonary artery systolic pressure is 44.5 mmHg assuming a   right atrial pressure of 3 mmHg, which is consistent with mild pulmonary   hypertension.  14. Endocardial visualization was enhanced with intravenous echo contrast.    < end of copied text >          Medications:  acetaminophen     Tablet .. 650 milliGRAM(s) Oral every 6 hours PRN  albuterol    90 MICROgram(s) HFA Inhaler 2 Puff(s) Inhalation every 6 hours PRN  albuterol/ipratropium for Nebulization 3 milliLiter(s) Nebulizer every 6 hours  amLODIPine   Tablet 10 milliGRAM(s) Oral daily  aspirin  chewable 81 milliGRAM(s) Oral daily  atorvastatin 40 milliGRAM(s) Oral at bedtime  budesonide 160 MICROgram(s)/formoterol 4.5 MICROgram(s) Inhaler 2 Puff(s) Inhalation two times a day  donepezil 10 milliGRAM(s) Oral at bedtime  heparin   Injectable 5000 Unit(s) SubCutaneous every 12 hours  HYDROmorphone  Injectable 1 milliGRAM(s) IV Push every 4 hours PRN  metoprolol tartrate 12.5 milliGRAM(s) Oral daily  oxyCODONE    IR 5 milliGRAM(s) Oral every 6 hours PRN  polyethylene glycol 3350 17 Gram(s) Oral two times a day  senna 2 Tablet(s) Oral at bedtime

## 2024-03-10 NOTE — PROGRESS NOTE ADULT - ASSESSMENT
87 yo F w/PMH of HTN, HLD, and dementia who presented to the ED with L hip pain and deformity after tripping on the stairs, falling to her left side, and being unable to get on her own now admitted to medicine for management of L femoral hip fracture pending L hip hemiarthroplasty by orthopedic surgery.    #Acute hypoxic respiratory failure  -however suspect chronic respiratory failure due to underlying undiagnosed COPD  -saturating well on 2L NC at rest  -CXR with b/l opacities; likely volume overload from IVF; give Lasix 40mg IV x1 today; repeat CXR in AM  -no wheezing  -c/w Symbicort, albuterol prn  -o/p pulm eval; 60 year smoking history   -wean o2 as tolerated; target O2 sat 88-92%   -IS 10x/hr     #Mechanical fall, no syncope  #L femoral hip fracture s/p hemiarthroplasty 3/6/24   -PT/rehab- poss 4a candidate?  -pain control  - Bowel reg: Senna and miralax   -dvt ppx: heparin 5000u sq q12h      #Acute hyponatremia- resolved   -patient observed to be drinking a lot of water   -went from 140 on 3/7 to 127 on 3/8; today 136    -patient asymptomatic  -patient's mucous membranes moist now   -hypoosmolar   -d/c IVF  -restrict free water; encourage PO intake   -d/c'd HCTZ    #HTN  - c/w home amlodipine 10 mg daily; holding HCTZ  -now on lopressor 12.5mg po q12h   -if BP rises can try ACE or ARB     #Moderate-severe AS  -o/p cardio   -keep euvolemic   -c/w beta blocker and baby aspirin    #Hypomagnesemia   -replete to >2.0      #HLD  - c/w home lipitor 40 mg nightly    #Dementia  -Patient and daughter report worsening forgetfulness lately  - Patient reports she has an appointment with a neurologist outpatient  - c/w home donepezil 10 mg daily     #Progress Note Handoff  Pending (specify): get patient OOB today, pt/rehab, pain control, monitor Na closely, wean O2, f/u CXR   Family discussion: Updated daughter Matilda at bedside, answered all questions.   Disposition: SNF vs 4a; anticipate; send covid swab   High risk given above acuity and comorbidities.

## 2024-03-11 ENCOUNTER — TRANSCRIPTION ENCOUNTER (OUTPATIENT)
Age: 89
End: 2024-03-11

## 2024-03-11 LAB
ALBUMIN SERPL ELPH-MCNC: 2.9 G/DL — LOW (ref 3.5–5.2)
ALP SERPL-CCNC: 68 U/L — SIGNIFICANT CHANGE UP (ref 30–115)
ALT FLD-CCNC: 19 U/L — SIGNIFICANT CHANGE UP (ref 0–41)
ANION GAP SERPL CALC-SCNC: 11 MMOL/L — SIGNIFICANT CHANGE UP (ref 7–14)
AST SERPL-CCNC: 20 U/L — SIGNIFICANT CHANGE UP (ref 0–41)
BASOPHILS # BLD AUTO: 0.07 K/UL — SIGNIFICANT CHANGE UP (ref 0–0.2)
BASOPHILS NFR BLD AUTO: 0.7 % — SIGNIFICANT CHANGE UP (ref 0–1)
BILIRUB SERPL-MCNC: 1.2 MG/DL — SIGNIFICANT CHANGE UP (ref 0.2–1.2)
BUN SERPL-MCNC: 19 MG/DL — SIGNIFICANT CHANGE UP (ref 10–20)
CALCIUM SERPL-MCNC: 8.1 MG/DL — LOW (ref 8.4–10.5)
CHLORIDE SERPL-SCNC: 100 MMOL/L — SIGNIFICANT CHANGE UP (ref 98–110)
CO2 SERPL-SCNC: 27 MMOL/L — SIGNIFICANT CHANGE UP (ref 17–32)
CREAT SERPL-MCNC: 0.6 MG/DL — LOW (ref 0.7–1.5)
EGFR: 87 ML/MIN/1.73M2 — SIGNIFICANT CHANGE UP
EOSINOPHIL # BLD AUTO: 0.1 K/UL — SIGNIFICANT CHANGE UP (ref 0–0.7)
EOSINOPHIL NFR BLD AUTO: 0.9 % — SIGNIFICANT CHANGE UP (ref 0–8)
GLUCOSE SERPL-MCNC: 117 MG/DL — HIGH (ref 70–99)
HCT VFR BLD CALC: 33.3 % — LOW (ref 37–47)
HGB BLD-MCNC: 11 G/DL — LOW (ref 12–16)
IMM GRANULOCYTES NFR BLD AUTO: 1 % — HIGH (ref 0.1–0.3)
LYMPHOCYTES # BLD AUTO: 1.76 K/UL — SIGNIFICANT CHANGE UP (ref 1.2–3.4)
LYMPHOCYTES # BLD AUTO: 16.7 % — LOW (ref 20.5–51.1)
MAGNESIUM SERPL-MCNC: 1.9 MG/DL — SIGNIFICANT CHANGE UP (ref 1.8–2.4)
MCHC RBC-ENTMCNC: 28.3 PG — SIGNIFICANT CHANGE UP (ref 27–31)
MCHC RBC-ENTMCNC: 33 G/DL — SIGNIFICANT CHANGE UP (ref 32–37)
MCV RBC AUTO: 85.6 FL — SIGNIFICANT CHANGE UP (ref 81–99)
MONOCYTES # BLD AUTO: 1.16 K/UL — HIGH (ref 0.1–0.6)
MONOCYTES NFR BLD AUTO: 11 % — HIGH (ref 1.7–9.3)
NEUTROPHILS # BLD AUTO: 7.35 K/UL — HIGH (ref 1.4–6.5)
NEUTROPHILS NFR BLD AUTO: 69.7 % — SIGNIFICANT CHANGE UP (ref 42.2–75.2)
NRBC # BLD: 0 /100 WBCS — SIGNIFICANT CHANGE UP (ref 0–0)
PLATELET # BLD AUTO: 284 K/UL — SIGNIFICANT CHANGE UP (ref 130–400)
PMV BLD: 9.9 FL — SIGNIFICANT CHANGE UP (ref 7.4–10.4)
POTASSIUM SERPL-MCNC: 3.9 MMOL/L — SIGNIFICANT CHANGE UP (ref 3.5–5)
POTASSIUM SERPL-SCNC: 3.9 MMOL/L — SIGNIFICANT CHANGE UP (ref 3.5–5)
PROT SERPL-MCNC: 5.2 G/DL — LOW (ref 6–8)
RBC # BLD: 3.89 M/UL — LOW (ref 4.2–5.4)
RBC # FLD: 15.2 % — HIGH (ref 11.5–14.5)
SODIUM SERPL-SCNC: 138 MMOL/L — SIGNIFICANT CHANGE UP (ref 135–146)
WBC # BLD: 10.55 K/UL — SIGNIFICANT CHANGE UP (ref 4.8–10.8)
WBC # FLD AUTO: 10.55 K/UL — SIGNIFICANT CHANGE UP (ref 4.8–10.8)

## 2024-03-11 PROCEDURE — 99232 SBSQ HOSP IP/OBS MODERATE 35: CPT

## 2024-03-11 PROCEDURE — 71045 X-RAY EXAM CHEST 1 VIEW: CPT | Mod: 26

## 2024-03-11 RX ORDER — IPRATROPIUM/ALBUTEROL SULFATE 18-103MCG
3 AEROSOL WITH ADAPTER (GRAM) INHALATION
Qty: 0 | Refills: 0 | DISCHARGE
Start: 2024-03-11

## 2024-03-11 RX ORDER — TIOTROPIUM BROMIDE 18 UG/1
2 CAPSULE ORAL; RESPIRATORY (INHALATION)
Qty: 1 | Refills: 0
Start: 2024-03-11 | End: 2024-04-09

## 2024-03-11 RX ORDER — ALBUTEROL 90 UG/1
2 AEROSOL, METERED ORAL
Qty: 0 | Refills: 0 | DISCHARGE
Start: 2024-03-11

## 2024-03-11 RX ORDER — ACETAMINOPHEN 500 MG
2 TABLET ORAL
Qty: 240 | Refills: 0
Start: 2024-03-11 | End: 2024-04-09

## 2024-03-11 RX ORDER — SENNA PLUS 8.6 MG/1
2 TABLET ORAL
Qty: 0 | Refills: 0 | DISCHARGE
Start: 2024-03-11

## 2024-03-11 RX ORDER — HYDROCHLOROTHIAZIDE 25 MG
1 TABLET ORAL
Qty: 0 | Refills: 0 | DISCHARGE

## 2024-03-11 RX ORDER — FUROSEMIDE 40 MG
1 TABLET ORAL
Qty: 0 | Refills: 0 | DISCHARGE
Start: 2024-03-11

## 2024-03-11 RX ORDER — METOPROLOL TARTRATE 50 MG
12.5 TABLET ORAL
Qty: 0 | Refills: 0 | DISCHARGE
Start: 2024-03-11

## 2024-03-11 RX ORDER — ASPIRIN/CALCIUM CARB/MAGNESIUM 324 MG
1 TABLET ORAL
Qty: 0 | Refills: 0 | DISCHARGE
Start: 2024-03-11

## 2024-03-11 RX ORDER — LANOLIN ALCOHOL/MO/W.PET/CERES
1 CREAM (GRAM) TOPICAL
Qty: 0 | Refills: 0 | DISCHARGE
Start: 2024-03-11

## 2024-03-11 RX ORDER — FUROSEMIDE 40 MG
40 TABLET ORAL DAILY
Refills: 0 | Status: DISCONTINUED | OUTPATIENT
Start: 2024-03-11 | End: 2024-03-12

## 2024-03-11 RX ORDER — OXYCODONE HYDROCHLORIDE 5 MG/1
1 TABLET ORAL
Qty: 0 | Refills: 0 | DISCHARGE
Start: 2024-03-11

## 2024-03-11 RX ORDER — BUDESONIDE AND FORMOTEROL FUMARATE DIHYDRATE 160; 4.5 UG/1; UG/1
2 AEROSOL RESPIRATORY (INHALATION)
Qty: 0 | Refills: 0 | DISCHARGE
Start: 2024-03-11

## 2024-03-11 RX ORDER — POLYETHYLENE GLYCOL 3350 17 G/17G
17 POWDER, FOR SOLUTION ORAL
Qty: 0 | Refills: 0 | DISCHARGE
Start: 2024-03-11

## 2024-03-11 RX ORDER — ENOXAPARIN SODIUM 100 MG/ML
40 INJECTION SUBCUTANEOUS
Qty: 1 | Refills: 0
Start: 2024-03-11 | End: 2024-04-14

## 2024-03-11 RX ADMIN — Medication 3 MILLILITER(S): at 14:53

## 2024-03-11 RX ADMIN — ATORVASTATIN CALCIUM 40 MILLIGRAM(S): 80 TABLET, FILM COATED ORAL at 21:58

## 2024-03-11 RX ADMIN — Medication 81 MILLIGRAM(S): at 11:28

## 2024-03-11 RX ADMIN — HYDROMORPHONE HYDROCHLORIDE 1 MILLIGRAM(S): 2 INJECTION INTRAMUSCULAR; INTRAVENOUS; SUBCUTANEOUS at 15:43

## 2024-03-11 RX ADMIN — HYDROMORPHONE HYDROCHLORIDE 1 MILLIGRAM(S): 2 INJECTION INTRAMUSCULAR; INTRAVENOUS; SUBCUTANEOUS at 14:57

## 2024-03-11 RX ADMIN — AMLODIPINE BESYLATE 10 MILLIGRAM(S): 2.5 TABLET ORAL at 05:02

## 2024-03-11 RX ADMIN — Medication 12.5 MILLIGRAM(S): at 05:02

## 2024-03-11 RX ADMIN — BUDESONIDE AND FORMOTEROL FUMARATE DIHYDRATE 2 PUFF(S): 160; 4.5 AEROSOL RESPIRATORY (INHALATION) at 21:54

## 2024-03-11 RX ADMIN — HEPARIN SODIUM 5000 UNIT(S): 5000 INJECTION INTRAVENOUS; SUBCUTANEOUS at 21:58

## 2024-03-11 RX ADMIN — HYDROMORPHONE HYDROCHLORIDE 1 MILLIGRAM(S): 2 INJECTION INTRAMUSCULAR; INTRAVENOUS; SUBCUTANEOUS at 10:11

## 2024-03-11 RX ADMIN — OXYCODONE HYDROCHLORIDE 5 MILLIGRAM(S): 5 TABLET ORAL at 05:03

## 2024-03-11 RX ADMIN — Medication 40 MILLIGRAM(S): at 11:28

## 2024-03-11 RX ADMIN — HYDROMORPHONE HYDROCHLORIDE 1 MILLIGRAM(S): 2 INJECTION INTRAMUSCULAR; INTRAVENOUS; SUBCUTANEOUS at 09:57

## 2024-03-11 RX ADMIN — Medication 3 MILLILITER(S): at 20:22

## 2024-03-11 RX ADMIN — BUDESONIDE AND FORMOTEROL FUMARATE DIHYDRATE 2 PUFF(S): 160; 4.5 AEROSOL RESPIRATORY (INHALATION) at 10:03

## 2024-03-11 RX ADMIN — DONEPEZIL HYDROCHLORIDE 10 MILLIGRAM(S): 10 TABLET, FILM COATED ORAL at 21:58

## 2024-03-11 RX ADMIN — Medication 5 MILLIGRAM(S): at 21:58

## 2024-03-11 RX ADMIN — Medication 3 MILLILITER(S): at 08:56

## 2024-03-11 RX ADMIN — HEPARIN SODIUM 5000 UNIT(S): 5000 INJECTION INTRAVENOUS; SUBCUTANEOUS at 09:57

## 2024-03-11 NOTE — DISCHARGE NOTE PROVIDER - NPI NUMBER (FOR [employer] USE ONLY) :
[[id number]] [[id number]]

## 2024-03-11 NOTE — DISCHARGE NOTE PROVIDER - NSDCCPCAREPLAN_GEN_ALL_CORE_FT
PRINCIPAL DISCHARGE DIAGNOSIS  Diagnosis: Fracture of left hip  Assessment and Plan of Treatment: S/p L hip hemiarthroplasty for femoral neck fx 3/6/2024.   - If discharged, patient should follow up with Dr. Sebastian at [address] [place] [address]. Phone number [phone] for scheduling/appointment.  - Patient should be discharged on 6 weeks (from surgery date) of appropriate DVT prophylaxis due to their decreased functional/ambulation status after lower extremity surgery. Orthopaedic preference would be Aspirin 81 mg BID  if there are no contraindications. If patient is already on home anticoagulation, they may continue home anticoagulation medication instead of aspirin. If patient is going to inpatient rehab/nursing facility, and they plan for DVT PPx with SQH/LVX, they may be placed on that instead of aspirin.        SECONDARY DISCHARGE DIAGNOSES  Diagnosis: Aortic stenosis  Assessment and Plan of Treatment: You have a narrowed aorta which obstructs the flow of blood out of your heart. It can lead to symptoms such as light headedness and even loss of consciousness. Please follow up with your Cardiologist in 1-2 weeks to discuss further management of your aortic stenosis. Please continue to take your blood pressure medications as prescribed.    Diagnosis: COPD without exacerbation  Assessment and Plan of Treatment: We suspect you have COPD. Chronic obstructive pulmonary disease (COPD) is a disease that limits the flow of air in and out of your lungs. This makes it harder to breathe. With COPD, you are also more likely to get lung infections. COPD includes chronic bronchitis and emphysema. COPD is most often caused by heavy, long-term cigarette smoking. If you smoke, quit. It is the best thing you can do for your COPD and your overall health. To avoid infections, wash your hands often. Do your best to keep your hands away from your face. Most germs are spread from your hands to your mouth. Get a flu shot every year. Also ask your provider about pneumonia vaccines. To stay healthy, get enough sleep, exercise regularly, and eat a balanced diet.  Take your medicines exactly as directed. Don't skip doses.  Call your provider immediately if you have any of the following:  Shortness of breath, wheezing, or coughing  Increased mucus  Yellow, green, bloody, or smelly mucus  Fever or chills  Tightness in your chest that does not go away with rest or medicine  An irregular heartbeat or a feeling that your heart is beating very fast  Swollen ankles.    Diagnosis: Acute hyponatremia  Assessment and Plan of Treatment: This was likely due to dehydration, your medication HCTZ (which was stopped), and something called SIADH. We restricted your free water intake, gave you IV fluids, and your sodium improved. Unfortunately it seems like the fluids we gave you caused a bit of fluid into the lungs, which we will give you a few days of Lasix for. Please follow with your primary care doctor, cardiologist, lung doctor.

## 2024-03-11 NOTE — DISCHARGE NOTE PROVIDER - CARE PROVIDER_API CALL
Julian Sebastian  Orthopaedic Surgery  [address] [facility] [address] [place] [address]  Phone: [phone]  Fax: [phone]  Follow Up Time: 1 week    YENI PARRISH  [address] [place] [address]  Phone: [phone]  Fax: [phone])-  Follow Up Time: 2 weeks    Art Weston  Pulmonary Disease  [address] [place] [address], [facility] [address]  [place] [address]  Phone: [phone]  Fax: [phone]  Follow Up Time: 1 month   Julian Sebastian  Orthopaedic Surgery  [address] [place] [address]  Phone: [phone]  Fax: [phone]  Follow Up Time: 1 week    YENI PARRISH  [address] [place] [address]  Phone: ()-  Fax: ()-  Follow Up Time: 2 weeks    Art Weston  Pulmonary Disease  [address] [facility], [address]  [place] [address]  Phone: [phone]  Fax: [phone]  Follow Up Time: 1 month    Theodore Deng  Pulmonary Disease  [address] [place] [address]  Phone: [phone]  Fax: [phone]  Follow Up Time: Routine    Malcolm Ramon  Neurology  [address] [facility], [address]  [place] [address]  Phone: [phone]  Fax: [phone]  Follow Up Time: Routine    Ahsan Yadav  Interventional Cardiology  [address] [facility], [address]  [place] [address]  Phone: [phone]  Fax: [phone]  Follow Up Time: Routine

## 2024-03-11 NOTE — PROGRESS NOTE ADULT - SUBJECTIVE AND OBJECTIVE BOX
NIDIA CAT  [facility] [address] ([facility])    ***My note supersedes ALL resident notes that I sign.  My corrections for their notes are in my note.***    Patient is a 88y old  Female who presents with a chief complaint of L femoral hip fracture s/p mechanical fall (11 Mar 2024 11:18)        Interval events:   Patient seen and examined at bedside. No acute events overnight. Slept better last night. Off O2. Sitting in chair now. No SOB. No fevers/chills/CP. Still c/o pain left hip.     -PMHx: HTN (hypertension)    Hypercholesteremia    Heavy cigarette smoker      -PSHx:History of laparoscopic cholecystectomy    H/O: hysterectomy    History of surgery            REVIEW OF SYSTEMS:  CONSTITUTIONAL: No fever, weight loss, or fatigue  RESPIRATORY: No cough, wheezing, chills or hemoptysis; No shortness of breath  CARDIOVASCULAR: No chest pain, palpitations, dizziness, or leg swelling  GASTROINTESTINAL: No abdominal or epigastric pain. No nausea, vomiting, or hematemesis; No diarrhea or constipation. No melena or hematochezia.  NEUROLOGICAL: No headaches  LYMPH NODES: No enlarged glands  MUSCULOSKELETAL: as above       T(C): , Max: 36.8 (03-11-24 @ 08:51)  HR: 66 (03-11-24 @ 08:51)  BP: 134/62 (03-11-24 @ 08:51)  RR: 18 (03-11-24 @ 08:51)  SpO2: 96% (03-11-24 @ 08:51)  CAPILLARY BLOOD GLUCOSE            PHYSICAL EXAM:   GENERAL: NAD, lying in bed comfortably, thin elderly female on RA   HEAD:  Atraumatic, Normocephalic  EYES: EOMI, PERRLA, conjunctiva and sclera clear   ENT: moist mucous membranes    NECK: Supple, No JVD  CHEST/LUNG: Clear to auscultation bilaterally; dec b/s b/l lower lobes   HEART: Regular rate and rhythm; right sided systolic murmur radiating to the carotids.   ABDOMEN: Bowel sounds present; Soft, Nontender, Nondistended. No hepatomegaly  EXTREMITIES:  2+ Peripheral Pulses, brisk capillary refill. No clubbing, cyanosis, or edema; left hip dressing c/d/i   NERVOUS SYSTEM:  Alert & Oriented X3, speech clear. No deficits     Consultant(s) Notes Reviewed:  [x ] YES  [ ] NO   Care Discussed with Consultants/Other Providers [ x] YES  [ ] NO      LABS:          11.0  10.55  )-------(284          33.3  N=69.7  L=16.7   MCV=85.6    138|100|19  ------------------<117<H>  3.9|27|0.6<L>  eGFR:--  Ca:8.1<L>            Microbiology:      RADIOLOGY & ADDITIONAL TESTS:  < from: Xray Chest 1 View- PORTABLE-Routine (Xray Chest 1 View- PORTABLE-Routine in AM.) (03.11.24 @ 04:17) >  IMPRESSION:    Unchanged bilateral opacities.    < end of copied text >        Medications:  acetaminophen     Tablet .. 650 milliGRAM(s) Oral every 6 hours PRN  albuterol    90 MICROgram(s) HFA Inhaler 2 Puff(s) Inhalation every 6 hours PRN  albuterol/ipratropium for Nebulization 3 milliLiter(s) Nebulizer every 6 hours  amLODIPine   Tablet 10 milliGRAM(s) Oral daily  aspirin  chewable 81 milliGRAM(s) Oral daily  atorvastatin 40 milliGRAM(s) Oral at bedtime  budesonide 160 MICROgram(s)/formoterol 4.5 MICROgram(s) Inhaler 2 Puff(s) Inhalation two times a day  donepezil 10 milliGRAM(s) Oral at bedtime  furosemide    Tablet 40 milliGRAM(s) Oral daily  heparin   Injectable 5000 Unit(s) SubCutaneous every 12 hours  HYDROmorphone  Injectable 1 milliGRAM(s) IV Push every 4 hours PRN  melatonin 5 milliGRAM(s) Oral <User Schedule>  metoprolol tartrate 12.5 milliGRAM(s) Oral daily  oxyCODONE    IR 5 milliGRAM(s) Oral every 6 hours PRN  polyethylene glycol 3350 17 Gram(s) Oral two times a day  senna 2 Tablet(s) Oral at bedtime

## 2024-03-11 NOTE — PROGRESS NOTE ADULT - ASSESSMENT
87 yo F w/PMH of HTN, HLD, and dementia who presented to the ED with L hip pain and deformity after tripping on the stairs, falling to her left side, and being unable to get on her own now admitted to medicine for management of L femoral hip fracture pending L hip hemiarthroplasty by orthopedic surgery.    #Acute hypoxic respiratory failure  -however suspect chronic respiratory failure due to underlying undiagnosed COPD  -saturating well on RA at rest   -CXR with b/l opacities; likely volume overload from IVF; can do PO Lasix 40mg qd for 5 days   -no wheezing  -c/w Symbicort, albuterol prn  -o/p pulm eval; 60 year smoking history    -wean o2 as tolerated; target O2 sat 88-92%   -IS 10x/hr      #Mechanical fall, no syncope  #L femoral hip fracture s/p hemiarthroplasty 3/6/24   -PT/rehab- appreciated   -pain control  - Bowel reg: Senna and miralax   -dvt ppx: heparin 5000u sq q12h      #Acute hyponatremia- resolved   -patient observed to be drinking a lot of water   -went from 140 on 3/7 to 127 on 3/8; today 138    -patient asymptomatic   -patient's mucous membranes moist now   -hypoosmolar   -d/c IVF  -restrict free water; encourage PO intake   -d/c'd HCTZ    #HTN  - c/w home amlodipine 10 mg daily; holding HCTZ  -now on lopressor 12.5mg po q12h   -if BP rises can try ACE or ARB     #Moderate-severe AS  -o/p cardio   -keep euvolemic   -c/w beta blocker and baby aspirin    #Hypomagnesemia   -replete to >2.0      #HLD  - c/w home lipitor 40 mg nightly    #Dementia  -Patient and daughter report worsening forgetfulness lately  - Patient reports she has an appointment with a neurologist outpatient  - c/w home donepezil 10 mg daily      #Progress Note Handoff  Pending (specify): d/c planning   Family discussion: Updated daughter Matilda at bedside, answered all questions.   Disposition: SNF pending auth

## 2024-03-11 NOTE — DISCHARGE NOTE PROVIDER - PROVIDER TOKENS
PROVIDER:[TOKEN:[[id number]],FOLLOWUP:[1 week]],PROVIDER:[TOKEN:[[id number]],FOLLOWUP:[2 weeks]],PROVIDER:[TOKEN:[[id number]],FOLLOWUP:[1 month]] PROVIDER:[TOKEN:[[id number]],FOLLOWUP:[1 week]],PROVIDER:[TOKEN:[[id number]],FOLLOWUP:[2 weeks]],PROVIDER:[TOKEN:[[id number]],FOLLOWUP:[1 month]],PROVIDER:[TOKEN:[[id number]],FOLLOWUP:[Routine]],PROVIDER:[TOKEN:[[id number]],FOLLOWUP:[Routine]],PROVIDER:[TOKEN:[[id number]],FOLLOWUP:[Routine]]

## 2024-03-11 NOTE — DISCHARGE NOTE PROVIDER - NSDCQMSTROKE_NEU_ALL_CORE
Cardiac Observation Unit (COU) H&P      Cardiology Attending:  Dr Yaya Melendrez  Established With:  None  NP/PA/Fellow:  Arlin Peoples NP  pager [phone]  Date of Admit:  9/23/2019  CC:  Chest pain    History Of Present Illness:  Patient is a 85 year old female.  Medical history is significant for hypertension, hyperlipidemia, GERD, diverticulosis, recurrent UTIs and thyroid nodules.  PTA medications include Flomax, nystatin powder, Tylenol, Premarin cream, Lipitor, losartan, vitamin D, Linzess capsule, probiotic, aspirin, calcium with vitamin D, omega-3.  Pt is compliant with these medications.    Pt presented to [facility] ED with complaints of chest and neck pain. Patient describes the discomfort as pressure. She states the pain started 4 nights ago in the left upper chest in her clavicle region. She states she was able to manage the pain with Tylenol extra strength. 2 nights ago the pain significantly worsened. When she woke yesterday morning she had pain all across her left chest and into her left neck. When she rolled onto her back in bed yesterday morning she felt pain all across her upper chest. She also had shortness of breath yesterday morning that inhibited her walking. The shortness of breath started to improve throughout the day yesterday but she still presented to urgent care. Urgent care center to the emergency department for more workup. This morning she states that she feels improved. She only experiences chest pain when she's taking a deep breath while lying down. Patient states that she had an ultrasound of the thyroid nodule 2 days before the pain in her left upper chest and neck started. She states the ultrasound was uncomfortable at the time it was performed and she is concerned that her pain is due to the ultrasound.    The patient lives in an independent apartment with thin and assisted living facility. She was never a smoker and denies alcohol or drug use. Patient states she is usually  fairly active. There are long core doors in her apartment building which she walks on a regular basis. She can walk through a grocery store and she can climb a flight of stairs without any issues.    Echocardiogram 12/17/18:  Normal left ventricular size, systolic function and wall thickness, with no regional wall motion abnormalities. Left ventricular  ejection fraction, 64 %. Grade I/IV diastolic dysfunction .  Normal right ventricular size and systolic function, RVSP could not be calculated .  Mildly dilated ascending aorta.  Mild aortic sclerosis, otherwise no significant valve abnormalities.      Cardiac Risk Factors:  hypertension , elevated cholesterol and age greater than 55 in a woman    The ASCVD Risk score (Hoangnettie BLUNT Jr., et al., 2013) failed to calculate for the following reasons:    The 2013 ASCVD risk score is only valid for ages 40 to 79    ED Eval:  Meds given:  ASA   CXR:  No acute cardiopulmonary findings  Cr:    Lab Results   Component Value Date    CREATININE 0.65 09/23/2019     Troponins:    Lab Results   Component Value Date    RAPDTR <0.02 09/24/2019    RAPDTR <0.02 09/23/2019    RAPDTR <0.02 09/23/2019     NT pBNP:    NT proBNP   Date Value Ref Range Status   09/23/2019 80 <451 pg/mL Final     DDimer:    Lab Results   Component Value Date    DDIMER 0.38 09/23/2019     ECG:  NSR  I have personally reviewed the EKG       Review of Systems:    Constitutional:  Negative for fever.  Negative for chills and appetite change.   HENT:  Negative for congestion and sore throat.   Eyes:  Negative for photophobia and visual disturbance.   Respiratory:  Negative for cough and choking.  Positive for SOB.  Cardiovascular:  Positive for left chest and neck pain  Gastrointestinal:  Negative for abdominal pain and constipation.  Negative for vomiting and abdominal distention.  No masses.   Musculoskeletal:  Negative for back pain and arthralgias.   Skin:  Negative for rash or color change.   Neurological:   Negative for dizziness and headaches.   Hematological:  Negative for adenopathy.   Psychiatric/Behavioral:  Negative for hallucinations, behavioral problems and agitation.  The patient is not nervous/anxious.    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   • Recurrent UTI    • Urinary incontinence      Past Surgical History:   Procedure Laterality Date   • Breast biopsy Left    • Cholecystectomy  1993   • Dilation and curettage of uterus  1986    3 x betwee 1986 and 1992   • Eye surgery  2003    eyelid surgery   • Removal gallbladder     • Toe surgery  1966     Current Facility-Administered Medications   Medication Dose Route Frequency Provider Last Rate Last Dose   • sodium chloride 0.9 % flush bag 25 mL  25 mL Intravenous PRN Kori Pacheco MD       • sodium chloride (PF) 0.9 % injection 2 mL  2 mL Intracatheter 2 times per day Kori Pacheco MD   2 mL at 09/23/19 1076   • nitroGLYcerin (NITROSTAT) sublingual tablet 0.4 mg  0.4 mg Sublingual Q5 Min PRN Eliud Zapata MD       • sodium chloride (NORMAL SALINE) 0.9 % bolus 500 mL  500 mL Intravenous PRN Eliud Zapata MD       • aspirin chewable 81 mg  81 mg Oral Daily Eilud Zapata MD       • atorvastatin (LIPITOR) tablet 80 mg  80 mg Oral Nightly Eliud Zapata MD       • losartan (COZAAR) tablet 25 mg  25 mg Oral Daily Eliud Zapata MD       •  albuterol-ipratropium 2.5 mg/0.5 mg (DUONEB) nebulizer solution 3 mL  3 mL Nebulization Q4H Resp PRN Eliud Zapata MD         ALLERGIES:   Allergen Reactions   • Demerol ANXIETY   • Ace Inhibitors Cough     Social History     Tobacco Use   • Smoking status: Never Smoker   • Smokeless tobacco: Never Used   Substance Use Topics   • Alcohol use: Yes     Alcohol/week: 0.0 - 1.0 standard drinks     Comment: wine once a week     Family History   Problem Relation Age of Onset   • Cancer Mother         breast   • High blood pressure Mother    • Infectious Disease Father         septicemia from strep throat    • High blood pressure Sister    • Kidney disease Sister          Objective:    Visit Vitals  /67 (BP Location: RUE - Right upper extremity, Patient Position: Sitting)   Pulse 71   Temp 97.7 °F (36.5 °C) (Oral)   Resp 23   Ht 5' 1\" (1.549 m)   Wt 70.7 kg   SpO2 96%   BMI 29.45 kg/m²     GENERAL:  Appears stated age, well developed and well nourished and in no distress.  LYMPH NODES:  No cervical adenopathy and no supraclavicular adenopathy.  SKIN:  Normal color, normal texture, normal turgor, no bruises, warm and dry and no rash or pallor.  HEAD:  Normocephalic and atraumatic.  EYES:  Extraocular movements are full.  Sclerae and conjunctivae are normal.  Lids and lashes are normal.  EARS:  Pinna and external ear is normal bilaterally, external auditory canals are normal and auditory acuity is grossly normal.  NOSE:  External nose is normal to inspection and no septal deviation.  MOUTH/THROAT:  Tongue is midline and appears normal, oral mucosa is normal.  NECK:  Neck is supple, no thyromegaly, no carotid bruits noted and there is full range of motion.  LUNGS:  Lungs are clear to auscultation with normal inspiratory/expiratory sounds, no rales, no rhonchi and no wheezes.  HEART:  Cardiac:  Normal PMI, normal rate and rhythm, S1 and S2 normal and no murmurs or palpable thrill.  Radial pulses +2 bilaterally. DP  pulses diminished bilaterally.  ABDOMEN:  Abdomen is soft, normoactive bowel sounds, nontender, without masses, or hepatosplenomegaly.  NEUROLOGIC:  Alert and oriented to person, place and time.  Motor strength normal, coordination normal and no tremor noted.  EXTREMITIES:  No clubbing, no cyanosis, no edema and normal muscle tone and development bilaterally.  PSYCHIATRIC:  Normal mood and affect.        LAB:  Lab Results   Component Value Date    TSH 1.087 08/21/2019    POTASSIUM 3.7 09/23/2019    CHLORIDE 107 09/23/2019    GLUCOSE 94 09/23/2019    CALCIUM 9.1 09/23/2019    CO2 28 09/23/2019    BUN 14 09/23/2019    CREATININE 0.65 09/23/2019    WBC 6.3 09/23/2019    RBC 4.35 09/23/2019    HCT 40.9 09/23/2019    HGB 12.8 09/23/2019     09/23/2019       Liver Function Test:  AST/SGOT (Units/L)   Date Value   08/21/2019 13     ALT/SGPT (Units/L)   Date Value   08/21/2019 19     ALK PHOSPHATASE (Units/L)   Date Value   08/21/2019 69     TOTAL BILIRUBIN (mg/dL)   Date Value   08/21/2019 1.1 (H)       CHOLESTEROL (mg/dL)   Date Value   08/21/2019 189     HDL (mg/dL)   Date Value   08/21/2019 70     CHOL/HDL (no units)   Date Value   08/21/2019 2.7     TRIGLYCERIDE (mg/dL)   Date Value   08/21/2019 94     CALCULATED LDL (mg/dL)   Date Value   08/21/2019 100         Impressions:  Chest pain: Troponin negative X3, EKG without acute ischemia and ddimer WNL.  HTN: Losartan  HLD: Low dose statin  Thyroid nodule: S/P US  Diminished pedal pulses: No complaints of claudication. ABIs normal. Has venous US scheduled for tomorrow as outpatient.    Plan:  -Admit to observation and continue essential home medications  -Monitor telemetry and vitals per routine standards  -Patient is S/P NM stress test. Await results.     If stress testing is negative, will confer with Dr Melendrez but anticipate DC to home; If stress testing positive, will again confer with Dr Melendrez but anticipate admission to hospital for further  investigation    Arlin WHATLEY Amarilismartha, DK  [phone]  9/24/2019  11:22 AM    For cardiac questions between 7 pm and 7 am please contact the Cardiology Consult Fellow on call.        Attending:  I attest to having seen and examined the patient with the NP during bedside rounds and I reviewed her documented note which reflects the services and clinical decision-making I personally provided.  Seen sitting up in bedside chair, has no complaints and denies recurrent CP since presentation.  I reviewed her cardiac testing results with her including stress test which showed no reversible ischemia and was an otherwise low risk study.  Her exam is otherwise unremarkable with normal heart tones, RRR, clear lung fields, and events of tele.  Seems OK for discharge with follow up instructions reviewed and to be placed in her discharge summary.    Yaya Melendrez MD FACC     No

## 2024-03-11 NOTE — DISCHARGE NOTE PROVIDER - ATTENDING DISCHARGE PHYSICAL EXAMINATION:
Patient seen and examined at bedside. No acute events overnight. Off oxygen. Still having some leg pain. Will give 5 days of Lasix po for CXR findings, likely from volume overload from IVF for hyponatremia. Updated daughter Matilda at bedside. Stable for d/c.    PHYSICAL EXAM:   GENERAL: NAD, lying in bed comfortably, thin elderly female on 2L NC   HEAD:  Atraumatic, Normocephalic  EYES: EOMI, PERRLA, conjunctiva and sclera clear   ENT: moist mucous membranes   NECK: Supple, No JVD  CHEST/LUNG: Clear to auscultation bilaterally; dec b/s b/l lower lobes   HEART: Regular rate and rhythm; right sided systolic murmur radiating to the carotids.   ABDOMEN: Bowel sounds present; Soft, Nontender, Nondistended. No hepatomegaly  EXTREMITIES:  2+ Peripheral Pulses, brisk capillary refill. No clubbing, cyanosis, or edema; left hip dressing c/d/i   NERVOUS SYSTEM:  Alert & Oriented X3, speech clear. No deficits     Consultant(s) Notes Reviewed:  [x ] YES  [ ] NO   Care Discussed with Consultants/Other Providers [ x] YES  [ ] NO    Vital Signs Last 24 Hrs  T(C): 36.8 (11 Mar 2024 08:51), Max: 36.8 (11 Mar 2024 08:51)  T(F): 98.3 (11 Mar 2024 08:51), Max: 98.3 (11 Mar 2024 08:51)  HR: 66 (11 Mar 2024 08:51) (66 - 83)  BP: 134/62 (11 Mar 2024 08:51) (117/78 - 134/62)  BP(mean): --  RR: 18 (11 Mar 2024 08:51) (18 - 18)  SpO2: 96% (11 Mar 2024 08:51) (96% - 100%)    Parameters below as of 11 Mar 2024 08:51  Patient On (Oxygen Delivery Method): nasal cannula  O2 Flow (L/min): 2   PHYSICAL EXAM:   GENERAL: NAD, lying in bed comfortably, thin elderly female on RA  HEAD:  Atraumatic, Normocephalic  EYES: EOMI, PERRLA, conjunctiva and sclera clear   ENT: moist mucous membranes   NECK: Supple, No JVD  CHEST/LUNG: Clear to auscultation bilaterally; dec b/s b/l lower lobes   HEART: Regular rate and rhythm; right sided systolic murmur radiating to the carotids.   ABDOMEN: Bowel sounds present; Soft, Nontender, Nondistended. No hepatomegaly  EXTREMITIES:  2+ Peripheral Pulses, brisk capillary refill. No clubbing, cyanosis, or edema; left hip dressing c/d/i   NERVOUS SYSTEM:  Alert & Oriented X3, speech clear. No deficits     Consultant(s) Notes Reviewed:  [x ] YES  [ ] NO   Care Discussed with Consultants/Other Providers [ x] YES  [ ] NO    Vital Signs Last 24 Hrs  T(C): 36.2 (12 Mar 2024 08:06), Max: 36.4 (12 Mar 2024 05:04)  T(F): 97.1 (12 Mar 2024 08:06), Max: 97.6 (12 Mar 2024 05:04)  HR: 59 (12 Mar 2024 08:06) (59 - 87)  BP: 132/86 (12 Mar 2024 08:06) (125/59 - 164/78)  BP(mean): --  RR: 18 (12 Mar 2024 08:06) (18 - 18)  SpO2: 96% (12 Mar 2024 08:06) (95% - 98%)    Parameters below as of 11 Mar 2024 16:48  Patient On (Oxygen Delivery Method): room air

## 2024-03-11 NOTE — DISCHARGE NOTE PROVIDER - NSDCMRMEDTOKEN_GEN_ALL_CORE_FT
albuterol 90 mcg/inh inhalation aerosol: 2 puff(s) inhaled every 6 hours As needed Shortness of Breath and/or Wheezing  amLODIPine 10 mg oral tablet: 1 tab(s) orally once a day  budesonide-formoterol 160 mcg-4.5 mcg/inh inhalation aerosol: 2 puff(s) inhaled 2 times a day  donepezil 10 mg oral tablet: 1 tab(s) orally once a day  furosemide 40 mg oral tablet: 1 tab(s) orally once a day For 5 days and stop on 3/15/2024  ipratropium-albuterol 0.5 mg-2.5 mg/3 mL inhalation solution: 3 milliliter(s) inhaled every 6 hours  Lipitor 40 mg oral tablet: 1 tab(s) orally once a day  polyethylene glycol 3350 oral powder for reconstitution: 17 gram(s) orally 2 times a day  senna leaf extract oral tablet: 2 tab(s) orally once a day (at bedtime)   albuterol 90 mcg/inh inhalation aerosol: 2 puff(s) inhaled every 6 hours As needed Shortness of Breath and/or Wheezing  amLODIPine 10 mg oral tablet: 1 tab(s) orally once a day  aspirin 81 mg oral tablet, chewable: 1 tab(s) orally once a day  budesonide-formoterol 160 mcg-4.5 mcg/inh inhalation aerosol: 2 puff(s) inhaled 2 times a day  donepezil 10 mg oral tablet: 1 tab(s) orally once a day  furosemide 40 mg oral tablet: 1 tab(s) orally once a day For 5 days and stop on 3/15/2024  ipratropium-albuterol 0.5 mg-2.5 mg/3 mL inhalation solution: 3 milliliter(s) inhaled every 6 hours  Lipitor 40 mg oral tablet: 1 tab(s) orally once a day  melatonin 5 mg oral tablet: 1 tab(s) orally  metoprolol: 12.5 milligram(s) orally 2 times a day  oxyCODONE 5 mg oral tablet: 1 tab(s) orally every 6 hours as needed for Moderate Pain (4 - 6)  polyethylene glycol 3350 oral powder for reconstitution: 17 gram(s) orally 2 times a day  senna leaf extract oral tablet: 2 tab(s) orally once a day (at bedtime)   acetaminophen 325 mg oral tablet: 2 tab(s) orally every 6 hours as needed for Mild Pain (1 - 3)  albuterol 90 mcg/inh inhalation aerosol: 2 puff(s) inhaled every 6 hours As needed Shortness of Breath and/or Wheezing  amLODIPine 10 mg oral tablet: 1 tab(s) orally once a day  aspirin 81 mg oral tablet, chewable: 1 tab(s) orally once a day  budesonide-formoterol 160 mcg-4.5 mcg/inh inhalation aerosol: 2 puff(s) inhaled 2 times a day  donepezil 10 mg oral tablet: 1 tab(s) orally once a day  furosemide 40 mg oral tablet: 1 tab(s) orally once a day For 5 days and stop on 3/15/2024  Lipitor 40 mg oral tablet: 1 tab(s) orally once a day  Lovenox 40 mg/0.4 mL injectable solution: 40 milligram(s) subcutaneously once a day continue for 6 weeks postop from date of surgery as dvt prophylaxis  metoprolol: 12.5 milligram(s) orally 2 times a day  oxyCODONE 5 mg oral tablet: 1 tab(s) orally every 6 hours as needed for Moderate Pain (4 - 6)  polyethylene glycol 3350 oral powder for reconstitution: 17 gram(s) orally 2 times a day  senna leaf extract oral tablet: 2 tab(s) orally once a day (at bedtime)  tiotropium 2.5 mcg/inh inhalation aerosol: 2 puff(s) inhaled once a day

## 2024-03-11 NOTE — DISCHARGE NOTE PROVIDER - HOSPITAL COURSE
87 yo F w/PMH of HTN, HLD, and dementia who presented to the ED with L hip pain and deformity after tripping on the stairs, falling to her left side, and being unable to get on her own now admitted to medicine for management of L femoral hip fracture pending L hip hemiarthroplasty by orthopedic surgery.    #Acute hypoxic respiratory failure  -however suspect chronic respiratory failure due to underlying undiagnosed COPD  -saturating well on 2L NC at rest  -CXR with b/l opacities; likely volume overload from IVF; give Lasix 40mg IV x1 today; repeat CXR in AM  -no wheezing  -c/w Symbicort, albuterol prn  -o/p pulm eval; 60 year smoking history   -wean o2 as tolerated; target O2 sat 88-92%   -IS 10x/hr     #Mechanical fall, no syncope  #L femoral hip fracture s/p hemiarthroplasty 3/6/24   -PT/rehab- poss 4a candidate?  -pain control  - Bowel reg: Senna and miralax   -dvt ppx: heparin 5000u sq q12h      #Acute hyponatremia- resolved   -patient observed to be drinking a lot of water   -went from 140 on 3/7 to 127 on 3/8; today 136    -patient asymptomatic  -patient's mucous membranes moist now   -hypoosmolar   -d/c IVF  -restrict free water; encourage PO intake   -d/c'd HCTZ    #HTN  - c/w home amlodipine 10 mg daily; holding HCTZ  -now on lopressor 12.5mg po q12h   -if BP rises can try ACE or ARB     #Moderate-severe AS  -o/p cardio   -keep euvolemic   -c/w beta blocker and baby aspirin    #Hypomagnesemia   -replete to >2.0      #HLD  - c/w home lipitor 40 mg nightly    #Dementia  -Patient and daughter report worsening forgetfulness lately  - Patient reports she has an appointment with a neurologist outpatient  - c/w home donepezil 10 mg daily

## 2024-03-11 NOTE — DISCHARGE NOTE PROVIDER - CARE PROVIDERS DIRECT ADDRESSES
,[email or web address]

## 2024-03-12 ENCOUNTER — NON-APPOINTMENT (OUTPATIENT)
Age: 89
End: 2024-03-12

## 2024-03-12 ENCOUNTER — TRANSCRIPTION ENCOUNTER (OUTPATIENT)
Age: 89
End: 2024-03-12

## 2024-03-12 VITALS
TEMPERATURE: 97 F | HEART RATE: 72 BPM | RESPIRATION RATE: 18 BRPM | DIASTOLIC BLOOD PRESSURE: 57 MMHG | SYSTOLIC BLOOD PRESSURE: 100 MMHG | OXYGEN SATURATION: 98 %

## 2024-03-12 LAB
ALBUMIN SERPL ELPH-MCNC: 3 G/DL — LOW (ref 3.5–5.2)
ALP SERPL-CCNC: 70 U/L — SIGNIFICANT CHANGE UP (ref 30–115)
ALT FLD-CCNC: 22 U/L — SIGNIFICANT CHANGE UP (ref 0–41)
ANION GAP SERPL CALC-SCNC: 10 MMOL/L — SIGNIFICANT CHANGE UP (ref 7–14)
AST SERPL-CCNC: 21 U/L — SIGNIFICANT CHANGE UP (ref 0–41)
BASOPHILS # BLD AUTO: 0.11 K/UL — SIGNIFICANT CHANGE UP (ref 0–0.2)
BASOPHILS NFR BLD AUTO: 0.9 % — SIGNIFICANT CHANGE UP (ref 0–1)
BILIRUB SERPL-MCNC: 0.8 MG/DL — SIGNIFICANT CHANGE UP (ref 0.2–1.2)
BUN SERPL-MCNC: 16 MG/DL — SIGNIFICANT CHANGE UP (ref 10–20)
CALCIUM SERPL-MCNC: 8.3 MG/DL — LOW (ref 8.4–10.4)
CHLORIDE SERPL-SCNC: 98 MMOL/L — SIGNIFICANT CHANGE UP (ref 98–110)
CO2 SERPL-SCNC: 27 MMOL/L — SIGNIFICANT CHANGE UP (ref 17–32)
CREAT SERPL-MCNC: 0.7 MG/DL — SIGNIFICANT CHANGE UP (ref 0.7–1.5)
EGFR: 84 ML/MIN/1.73M2 — SIGNIFICANT CHANGE UP
EOSINOPHIL # BLD AUTO: 0.11 K/UL — SIGNIFICANT CHANGE UP (ref 0–0.7)
EOSINOPHIL NFR BLD AUTO: 0.9 % — SIGNIFICANT CHANGE UP (ref 0–8)
GLUCOSE SERPL-MCNC: 113 MG/DL — HIGH (ref 70–99)
HCT VFR BLD CALC: 31.9 % — LOW (ref 37–47)
HGB BLD-MCNC: 10.6 G/DL — LOW (ref 12–16)
IMM GRANULOCYTES NFR BLD AUTO: 1.4 % — HIGH (ref 0.1–0.3)
LYMPHOCYTES # BLD AUTO: 2.45 K/UL — SIGNIFICANT CHANGE UP (ref 1.2–3.4)
LYMPHOCYTES # BLD AUTO: 20.9 % — SIGNIFICANT CHANGE UP (ref 20.5–51.1)
MCHC RBC-ENTMCNC: 28.6 PG — SIGNIFICANT CHANGE UP (ref 27–31)
MCHC RBC-ENTMCNC: 33.2 G/DL — SIGNIFICANT CHANGE UP (ref 32–37)
MCV RBC AUTO: 86 FL — SIGNIFICANT CHANGE UP (ref 81–99)
MONOCYTES # BLD AUTO: 1.23 K/UL — HIGH (ref 0.1–0.6)
MONOCYTES NFR BLD AUTO: 10.5 % — HIGH (ref 1.7–9.3)
NEUTROPHILS # BLD AUTO: 7.69 K/UL — HIGH (ref 1.4–6.5)
NEUTROPHILS NFR BLD AUTO: 65.4 % — SIGNIFICANT CHANGE UP (ref 42.2–75.2)
NRBC # BLD: 0 /100 WBCS — SIGNIFICANT CHANGE UP (ref 0–0)
PLATELET # BLD AUTO: 290 K/UL — SIGNIFICANT CHANGE UP (ref 130–400)
PMV BLD: 9.9 FL — SIGNIFICANT CHANGE UP (ref 7.4–10.4)
POTASSIUM SERPL-MCNC: 4.1 MMOL/L — SIGNIFICANT CHANGE UP (ref 3.5–5)
POTASSIUM SERPL-SCNC: 4.1 MMOL/L — SIGNIFICANT CHANGE UP (ref 3.5–5)
PROT SERPL-MCNC: 5.3 G/DL — LOW (ref 6–8)
RBC # BLD: 3.71 M/UL — LOW (ref 4.2–5.4)
RBC # FLD: 15.3 % — HIGH (ref 11.5–14.5)
SODIUM SERPL-SCNC: 135 MMOL/L — SIGNIFICANT CHANGE UP (ref 135–146)
SURGICAL PATHOLOGY STUDY: SIGNIFICANT CHANGE UP
WBC # BLD: 11.75 K/UL — HIGH (ref 4.8–10.8)
WBC # FLD AUTO: 11.75 K/UL — HIGH (ref 4.8–10.8)

## 2024-03-12 PROCEDURE — 99239 HOSP IP/OBS DSCHRG MGMT >30: CPT

## 2024-03-12 RX ADMIN — AMLODIPINE BESYLATE 10 MILLIGRAM(S): 2.5 TABLET ORAL at 05:26

## 2024-03-12 RX ADMIN — HYDROMORPHONE HYDROCHLORIDE 1 MILLIGRAM(S): 2 INJECTION INTRAMUSCULAR; INTRAVENOUS; SUBCUTANEOUS at 10:04

## 2024-03-12 RX ADMIN — HYDROMORPHONE HYDROCHLORIDE 1 MILLIGRAM(S): 2 INJECTION INTRAMUSCULAR; INTRAVENOUS; SUBCUTANEOUS at 15:54

## 2024-03-12 RX ADMIN — HYDROMORPHONE HYDROCHLORIDE 1 MILLIGRAM(S): 2 INJECTION INTRAMUSCULAR; INTRAVENOUS; SUBCUTANEOUS at 05:25

## 2024-03-12 RX ADMIN — Medication 12.5 MILLIGRAM(S): at 05:25

## 2024-03-12 RX ADMIN — HEPARIN SODIUM 5000 UNIT(S): 5000 INJECTION INTRAVENOUS; SUBCUTANEOUS at 11:02

## 2024-03-12 RX ADMIN — BUDESONIDE AND FORMOTEROL FUMARATE DIHYDRATE 2 PUFF(S): 160; 4.5 AEROSOL RESPIRATORY (INHALATION) at 11:05

## 2024-03-12 RX ADMIN — Medication 3 MILLILITER(S): at 14:49

## 2024-03-12 RX ADMIN — Medication 81 MILLIGRAM(S): at 11:02

## 2024-03-12 RX ADMIN — HYDROMORPHONE HYDROCHLORIDE 1 MILLIGRAM(S): 2 INJECTION INTRAMUSCULAR; INTRAVENOUS; SUBCUTANEOUS at 09:51

## 2024-03-12 RX ADMIN — Medication 40 MILLIGRAM(S): at 05:25

## 2024-03-12 RX ADMIN — HYDROMORPHONE HYDROCHLORIDE 1 MILLIGRAM(S): 2 INJECTION INTRAMUSCULAR; INTRAVENOUS; SUBCUTANEOUS at 15:39

## 2024-03-12 NOTE — PROGRESS NOTE ADULT - REASON FOR ADMISSION
L femoral hip fracture s/p mechanical fall

## 2024-03-12 NOTE — DISCHARGE NOTE NURSING/CASE MANAGEMENT/SOCIAL WORK - NSDCPEFALRISK_GEN_ALL_CORE
For information on Fall & Injury Prevention, visit: [email or web address] OR  [email or web address] OR  https://www.cdc.gov/steadi/patient.html

## 2024-03-12 NOTE — DISCHARGE NOTE NURSING/CASE MANAGEMENT/SOCIAL WORK - PATIENT PORTAL LINK FT
You can access the FollowMyHealth Patient Portal offered by [facility] by registering at the following website: [email or web address]. By joining [employer]’s FollowMyHealth portal, you will also be able to view your health information using other applications (apps) compatible with our system.

## 2024-03-12 NOTE — PROGRESS NOTE ADULT - ASSESSMENT
87 yo F w/PMH of HTN, HLD, and dementia who presented to the ED with L hip pain and deformity after tripping on the stairs, falling to her left side, and being unable to get on her own now admitted to medicine for management of L femoral hip fracture pending L hip hemiarthroplasty by orthopedic surgery.    #Acute hypoxic respiratory failure  -however suspect chronic respiratory failure due to underlying undiagnosed COPD  -saturating well on RA at rest   -CXR with b/l opacities; likely volume overload from IVF; can do PO Lasix 40mg qd for 4 more days   -no wheezing  -c/w Symbicort, albuterol prn   -o/p pulm eval; 60 year smoking history    -wean o2 as tolerated; target O2 sat 88-92%   -IS 10x/hr      #Mechanical fall, no syncope  #L femoral hip fracture s/p hemiarthroplasty 3/6/24   -PT/rehab- appreciated   -pain control  - Bowel reg: Senna and miralax   -dvt ppx: heparin 5000u sq q12h      #Acute hyponatremia- resolved   -patient observed to be drinking a lot of water   -went from 140 on 3/7 to 127 on 3/8; today 135    -patient asymptomatic   -patient's mucous membranes moist now   -hypoosmolar   -d/c'd IVF   -restrict free water; encourage PO intake   -d/c'd HCTZ    #HTN   - c/w home amlodipine 10 mg daily; holding HCTZ  -now on lopressor 12.5mg po q12h   -if BP rises can try ACE or ARB     #Moderate-severe AS  -o/p cardio   -keep euvolemic   -c/w beta blocker and baby aspirin    #Hypomagnesemia   -replete to >2.0      #HLD  - c/w home lipitor 40 mg nightly    #Dementia  -Patient and daughter report worsening forgetfulness lately  - Patient reports she has an appointment with a neurologist outpatient  - c/w home donepezil 10 mg daily      #Progress Note Handoff  Pending (specify): d/c   Family discussion: Updated daughter Matilda at bedside, answered all questions.   Disposition: SNF

## 2024-03-12 NOTE — PROGRESS NOTE ADULT - TIME BILLING
Total time spent to complete patient's bedside assessment, physical examination, review medical chart including labs & imaging, discuss medical plan of care with housestaff was more than 35 minutes
Total time spent to complete patient's bedside assessment, physical examination, review medical chart including labs & imaging, discuss medical plan of care with housestaff was more than 50 minutes.
Total time spent to complete patient's bedside assessment, physical examination, review medical chart including labs & imaging, discuss medical plan of care with housestaff was more than 35 minutes
Total time spent to complete patient's bedside assessment, physical examination, review medical chart including labs & imaging, discuss medical plan of care with housestaff was more than 50 minutes.
Total time spent to complete patient's bedside assessment, physical examination, review medical chart including labs & imaging, discuss medical plan of care with housestaff was more than 35 minutes
Total time spent to complete patient's bedside assessment, physical examination, review medical chart including labs & imaging, discuss medical plan of care with housestaff was more than 50 minutes.

## 2024-03-12 NOTE — PROGRESS NOTE ADULT - SUBJECTIVE AND OBJECTIVE BOX
NIDIACAT MCCALL  [facility] 011 A ([facility])    ***My note supersedes ALL resident notes that I sign.  My corrections for their notes are in my note.***    Patient is a 88y old  Female who presents with a chief complaint of L femoral hip fracture s/p mechanical fall (11 Mar 2024 16:20)        Interval events:   Patient seen and examined at bedside. No acute events overnight. Says she feels well. No SOB. No fevers. Pain controlled.     -PMHx: HTN (hypertension)    Hypercholesteremia    Heavy cigarette smoker      -PSHx:History of laparoscopic cholecystectomy    H/O: hysterectomy    History of surgery            REVIEW OF SYSTEMS:  CONSTITUTIONAL: No fever, weight loss, or fatigue  RESPIRATORY: No cough, wheezing, chills or hemoptysis; No shortness of breath  CARDIOVASCULAR: No chest pain, palpitations, dizziness, or leg swelling  GASTROINTESTINAL: No abdominal or epigastric pain. No nausea, vomiting, or hematemesis; No diarrhea or constipation. No melena or hematochezia.  NEUROLOGICAL: No headaches  LYMPH NODES: No enlarged glands  MUSCULOSKELETAL: as above       T(C): , Max: 36.4 (03-12-24 @ 05:04)  HR: 59 (03-12-24 @ 08:06)  BP: 132/86 (03-12-24 @ 08:06)  RR: 18 (03-12-24 @ 08:06)  SpO2: 96% (03-12-24 @ 08:06)  CAPILLARY BLOOD GLUCOSE          PHYSICAL EXAM:   GENERAL: NAD, lying in bed comfortably, thin elderly female on RA   HEAD:  Atraumatic, Normocephalic  EYES: EOMI, PERRLA, conjunctiva and sclera clear   ENT: moist mucous membranes    NECK: Supple, No JVD  CHEST/LUNG: Clear to auscultation bilaterally; dec b/s b/l lower lobes   HEART: Regular rate and rhythm; right sided systolic murmur radiating to the carotids.   ABDOMEN: Bowel sounds present; Soft, Nontender, Nondistended. No hepatomegaly  EXTREMITIES:  2+ Peripheral Pulses, brisk capillary refill. No clubbing, cyanosis, or edema; left hip dressing c/d/i   NERVOUS SYSTEM:  Alert & Oriented X3, speech clear. No deficits     Consultant(s) Notes Reviewed:  [x ] YES  [ ] NO   Care Discussed with Consultants/Other Providers [ x] YES  [ ] NO       LABS:          10.6  11.75  )-------(290          31.9  N=65.4  L=20.9  MCV=86.0    135|98|16  ------------------<113<H>  4.1|27|0.7  eGFR:--  Ca:8.3<L>            Microbiology:      RADIOLOGY & ADDITIONAL TESTS:        Medications:  acetaminophen     Tablet .. 650 milliGRAM(s) Oral every 6 hours PRN  albuterol    90 MICROgram(s) HFA Inhaler 2 Puff(s) Inhalation every 6 hours PRN  albuterol/ipratropium for Nebulization 3 milliLiter(s) Nebulizer every 6 hours  amLODIPine   Tablet 10 milliGRAM(s) Oral daily  aspirin  chewable 81 milliGRAM(s) Oral daily  atorvastatin 40 milliGRAM(s) Oral at bedtime  budesonide 160 MICROgram(s)/formoterol 4.5 MICROgram(s) Inhaler 2 Puff(s) Inhalation two times a day  donepezil 10 milliGRAM(s) Oral at bedtime  furosemide    Tablet 40 milliGRAM(s) Oral daily  heparin   Injectable 5000 Unit(s) SubCutaneous every 12 hours  HYDROmorphone  Injectable 1 milliGRAM(s) IV Push every 4 hours PRN  melatonin 5 milliGRAM(s) Oral <User Schedule>  metoprolol tartrate 12.5 milliGRAM(s) Oral daily  oxyCODONE    IR 5 milliGRAM(s) Oral every 6 hours PRN  polyethylene glycol 3350 17 Gram(s) Oral two times a day  senna 2 Tablet(s) Oral at bedtime

## 2024-03-12 NOTE — PROGRESS NOTE ADULT - PROVIDER SPECIALTY LIST ADULT
Hospitalist
Hospitalist
Orthopedics
Physiatry
Hospitalist
Orthopedics
Hospitalist
Orthopedics
Hospitalist

## 2024-03-19 DIAGNOSIS — I10 ESSENTIAL (PRIMARY) HYPERTENSION: ICD-10-CM
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
## 2024-03-19 DIAGNOSIS — F03.90 UNSPECIFIED DEMENTIA, UNSPECIFIED SEVERITY, WITHOUT BEHAVIORAL DISTURBANCE, PSYCHOTIC DISTURBANCE, MOOD DISTURBANCE, AND ANXIETY: ICD-10-CM

## 2024-03-19 DIAGNOSIS — S72.042A DISPLACED FRACTURE OF BASE OF NECK OF LEFT FEMUR, INITIAL ENCOUNTER FOR CLOSED FRACTURE: ICD-10-CM

## 2024-03-19 DIAGNOSIS — J44.9 CHRONIC OBSTRUCTIVE PULMONARY DISEASE, UNSPECIFIED: ICD-10-CM

## 2024-03-19 DIAGNOSIS — I35.0 NONRHEUMATIC AORTIC (VALVE) STENOSIS: ICD-10-CM

## 2024-03-19 DIAGNOSIS — Z90.49 ACQUIRED ABSENCE OF OTHER SPECIFIED PARTS OF DIGESTIVE TRACT: ICD-10-CM

## 2024-03-19 DIAGNOSIS — E78.5 HYPERLIPIDEMIA, UNSPECIFIED: ICD-10-CM

## 2024-03-19 DIAGNOSIS — F17.210 NICOTINE DEPENDENCE, CIGARETTES, UNCOMPLICATED: ICD-10-CM

## 2024-03-19 DIAGNOSIS — E83.42 HYPOMAGNESEMIA: ICD-10-CM
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
## 2024-03-19 DIAGNOSIS — E87.1 HYPO-OSMOLALITY AND HYPONATREMIA: ICD-10-CM

## 2024-03-19 DIAGNOSIS — Z90.710 ACQUIRED ABSENCE OF BOTH CERVIX AND UTERUS: ICD-10-CM

## 2024-03-19 DIAGNOSIS — J96.21 ACUTE AND CHRONIC RESPIRATORY FAILURE WITH HYPOXIA: ICD-10-CM

## 2024-03-26 ENCOUNTER — APPOINTMENT (OUTPATIENT)
Dept: ORTHOPEDIC SURGERY | Facility: CLINIC | Age: 89
End: 2024-03-26
Payer: MEDICARE

## 2024-03-26 DIAGNOSIS — Z96.649 PRESENCE OF UNSPECIFIED ARTIFICIAL HIP JOINT: ICD-10-CM

## 2024-03-26 PROCEDURE — 99024 POSTOP FOLLOW-UP VISIT: CPT

## 2024-03-26 PROCEDURE — 73502 X-RAY EXAM HIP UNI 2-3 VIEWS: CPT

## 2024-03-26 NOTE — HISTORY OF PRESENT ILLNESS
[de-identified] : 88-year-old woman returns for annual follow-up status post left hip hemiarthroplasty for displaced femoral neck fracture on March 5, 2024.  Patient is currently residing in a skilled nursing facility.  She contends with some cognitive impairment.  She is still walking well with assistive device according to reports from her aide.  No history of fevers or drainage.  Pain well-controlled with Tylenol.  Patient is walking with a walker and rehab.

## 2024-03-26 NOTE — ASSESSMENT
[FreeTextEntry1] : 88-year-old woman with some cognitive impairment status post left hip hemiarthroplasty 2 and half weeks ago.  Wounds of healed sufficiently for staple removal.  May shower but should avoid baths or soaks for 2 weeks.  Continue with physical therapy.  Physical therapy to continue work on gait training balance and generalized conditioning.  Physical therapy can also work on hip strengthening and core strengthening.  Will have the patient follow-up in my office in 2 to 3 months for interval check with radiographs of her left hip.  If she has any problems I am happy to see her back sooner.  All questions were answered to her and her daughter satisfaction.  Copy note provided to skilled nursing facility.

## 2024-04-07 ENCOUNTER — INPATIENT (INPATIENT)
Facility: HOSPITAL | Age: 89
LOS: 2 days | Discharge: NURSING, REHAB OR LONG-TERM CARE | DRG: 393 | End: 2024-04-10
Attending: STUDENT IN AN ORGANIZED HEALTH CARE EDUCATION/TRAINING PROGRAM | Admitting: STUDENT IN AN ORGANIZED HEALTH CARE EDUCATION/TRAINING PROGRAM
Payer: MEDICARE

## 2024-04-07 VITALS
TEMPERATURE: 98 F | HEART RATE: 57 BPM | OXYGEN SATURATION: 96 % | DIASTOLIC BLOOD PRESSURE: 63 MMHG | RESPIRATION RATE: 18 BRPM | HEIGHT: 66 IN | SYSTOLIC BLOOD PRESSURE: 147 MMHG

## 2024-04-07 DIAGNOSIS — Z90.710 ACQUIRED ABSENCE OF BOTH CERVIX AND UTERUS: Chronic | ICD-10-CM

## 2024-04-07 DIAGNOSIS — Z90.49 ACQUIRED ABSENCE OF OTHER SPECIFIED PARTS OF DIGESTIVE TRACT: Chronic | ICD-10-CM

## 2024-04-07 DIAGNOSIS — Z98.890 OTHER SPECIFIED POSTPROCEDURAL STATES: Chronic | ICD-10-CM

## 2024-04-07 DIAGNOSIS — I82.409 ACUTE EMBOLISM AND THROMBOSIS OF UNSPECIFIED DEEP VEINS OF UNSPECIFIED LOWER EXTREMITY: ICD-10-CM

## 2024-04-07 LAB
ALBUMIN SERPL ELPH-MCNC: 3.3 G/DL — LOW (ref 3.5–5.2)
ALP SERPL-CCNC: 73 U/L — SIGNIFICANT CHANGE UP (ref 30–115)
ALT FLD-CCNC: 14 U/L — SIGNIFICANT CHANGE UP (ref 0–41)
ANION GAP SERPL CALC-SCNC: 9 MMOL/L — SIGNIFICANT CHANGE UP (ref 7–14)
APTT BLD: 31.6 SEC — SIGNIFICANT CHANGE UP (ref 27–39.2)
AST SERPL-CCNC: 12 U/L — SIGNIFICANT CHANGE UP (ref 0–41)
BASOPHILS # BLD AUTO: 0.02 K/UL — SIGNIFICANT CHANGE UP (ref 0–0.2)
BASOPHILS NFR BLD AUTO: 0.5 % — SIGNIFICANT CHANGE UP (ref 0–1)
BILIRUB SERPL-MCNC: 0.3 MG/DL — SIGNIFICANT CHANGE UP (ref 0.2–1.2)
BLD GP AB SCN SERPL QL: SIGNIFICANT CHANGE UP
BUN SERPL-MCNC: 15 MG/DL — SIGNIFICANT CHANGE UP (ref 10–20)
CALCIUM SERPL-MCNC: 8.4 MG/DL — SIGNIFICANT CHANGE UP (ref 8.4–10.4)
CHLORIDE SERPL-SCNC: 104 MMOL/L — SIGNIFICANT CHANGE UP (ref 98–110)
CO2 SERPL-SCNC: 22 MMOL/L — SIGNIFICANT CHANGE UP (ref 17–32)
CREAT SERPL-MCNC: 0.7 MG/DL — SIGNIFICANT CHANGE UP (ref 0.7–1.5)
EGFR: 84 ML/MIN/1.73M2 — SIGNIFICANT CHANGE UP
EOSINOPHIL # BLD AUTO: 0.03 K/UL — SIGNIFICANT CHANGE UP (ref 0–0.7)
EOSINOPHIL NFR BLD AUTO: 0.8 % — SIGNIFICANT CHANGE UP (ref 0–8)
GLUCOSE SERPL-MCNC: 104 MG/DL — HIGH (ref 70–99)
HCT VFR BLD CALC: 32 % — LOW (ref 37–47)
HGB BLD-MCNC: 10.3 G/DL — LOW (ref 12–16)
IMM GRANULOCYTES NFR BLD AUTO: 0.3 % — SIGNIFICANT CHANGE UP (ref 0.1–0.3)
INR BLD: 1.13 RATIO — SIGNIFICANT CHANGE UP (ref 0.65–1.3)
LYMPHOCYTES # BLD AUTO: 1.93 K/UL — SIGNIFICANT CHANGE UP (ref 1.2–3.4)
LYMPHOCYTES # BLD AUTO: 49.6 % — SIGNIFICANT CHANGE UP (ref 20.5–51.1)
MCHC RBC-ENTMCNC: 27.9 PG — SIGNIFICANT CHANGE UP (ref 27–31)
MCHC RBC-ENTMCNC: 32.2 G/DL — SIGNIFICANT CHANGE UP (ref 32–37)
MCV RBC AUTO: 86.7 FL — SIGNIFICANT CHANGE UP (ref 81–99)
MONOCYTES # BLD AUTO: 0.53 K/UL — SIGNIFICANT CHANGE UP (ref 0.1–0.6)
MONOCYTES NFR BLD AUTO: 13.6 % — HIGH (ref 1.7–9.3)
NEUTROPHILS # BLD AUTO: 1.37 K/UL — LOW (ref 1.4–6.5)
NEUTROPHILS NFR BLD AUTO: 35.2 % — LOW (ref 42.2–75.2)
NRBC # BLD: 0 /100 WBCS — SIGNIFICANT CHANGE UP (ref 0–0)
PLATELET # BLD AUTO: 155 K/UL — SIGNIFICANT CHANGE UP (ref 130–400)
PMV BLD: 9.7 FL — SIGNIFICANT CHANGE UP (ref 7.4–10.4)
POTASSIUM SERPL-MCNC: 3.9 MMOL/L — SIGNIFICANT CHANGE UP (ref 3.5–5)
POTASSIUM SERPL-SCNC: 3.9 MMOL/L — SIGNIFICANT CHANGE UP (ref 3.5–5)
PROT SERPL-MCNC: 5.6 G/DL — LOW (ref 6–8)
PROTHROM AB SERPL-ACNC: 12.9 SEC — HIGH (ref 9.95–12.87)
RBC # BLD: 3.69 M/UL — LOW (ref 4.2–5.4)
RBC # FLD: 15.6 % — HIGH (ref 11.5–14.5)
SODIUM SERPL-SCNC: 135 MMOL/L — SIGNIFICANT CHANGE UP (ref 135–146)
WBC # BLD: 3.89 K/UL — LOW (ref 4.8–10.8)
WBC # FLD AUTO: 3.89 K/UL — LOW (ref 4.8–10.8)

## 2024-04-07 PROCEDURE — 80048 BASIC METABOLIC PNL TOTAL CA: CPT

## 2024-04-07 PROCEDURE — 86850 RBC ANTIBODY SCREEN: CPT

## 2024-04-07 PROCEDURE — 74177 CT ABD & PELVIS W/CONTRAST: CPT | Mod: 26,MC

## 2024-04-07 PROCEDURE — 99223 1ST HOSP IP/OBS HIGH 75: CPT

## 2024-04-07 PROCEDURE — 36415 COLL VENOUS BLD VENIPUNCTURE: CPT

## 2024-04-07 PROCEDURE — 81003 URINALYSIS AUTO W/O SCOPE: CPT

## 2024-04-07 PROCEDURE — 85027 COMPLETE CBC AUTOMATED: CPT

## 2024-04-07 PROCEDURE — 82962 GLUCOSE BLOOD TEST: CPT

## 2024-04-07 PROCEDURE — 80053 COMPREHEN METABOLIC PANEL: CPT

## 2024-04-07 PROCEDURE — 85025 COMPLETE CBC W/AUTO DIFF WBC: CPT

## 2024-04-07 PROCEDURE — 99497 ADVNCD CARE PLAN 30 MIN: CPT | Mod: 25

## 2024-04-07 PROCEDURE — 94640 AIRWAY INHALATION TREATMENT: CPT

## 2024-04-07 PROCEDURE — 85730 THROMBOPLASTIN TIME PARTIAL: CPT

## 2024-04-07 PROCEDURE — 87086 URINE CULTURE/COLONY COUNT: CPT

## 2024-04-07 PROCEDURE — 83735 ASSAY OF MAGNESIUM: CPT

## 2024-04-07 PROCEDURE — 97162 PT EVAL MOD COMPLEX 30 MIN: CPT | Mod: GP

## 2024-04-07 PROCEDURE — 93970 EXTREMITY STUDY: CPT | Mod: 26

## 2024-04-07 PROCEDURE — C9113: CPT

## 2024-04-07 PROCEDURE — 99285 EMERGENCY DEPT VISIT HI MDM: CPT

## 2024-04-07 PROCEDURE — 86901 BLOOD TYPING SEROLOGIC RH(D): CPT

## 2024-04-07 PROCEDURE — 86900 BLOOD TYPING SEROLOGIC ABO: CPT

## 2024-04-07 RX ORDER — HEPARIN SODIUM 5000 [USP'U]/ML
INJECTION INTRAVENOUS; SUBCUTANEOUS
Qty: 25000 | Refills: 0 | Status: DISCONTINUED | OUTPATIENT
Start: 2024-04-07 | End: 2024-04-09

## 2024-04-07 RX ORDER — TRAZODONE HCL 50 MG
75 TABLET ORAL AT BEDTIME
Refills: 0 | Status: DISCONTINUED | OUTPATIENT
Start: 2024-04-07 | End: 2024-04-10

## 2024-04-07 RX ORDER — ATORVASTATIN CALCIUM 80 MG/1
40 TABLET, FILM COATED ORAL AT BEDTIME
Refills: 0 | Status: DISCONTINUED | OUTPATIENT
Start: 2024-04-07 | End: 2024-04-10

## 2024-04-07 RX ORDER — PANTOPRAZOLE SODIUM 20 MG/1
40 TABLET, DELAYED RELEASE ORAL ONCE
Refills: 0 | Status: COMPLETED | OUTPATIENT
Start: 2024-04-07 | End: 2024-04-07

## 2024-04-07 RX ORDER — BUDESONIDE AND FORMOTEROL FUMARATE DIHYDRATE 160; 4.5 UG/1; UG/1
2 AEROSOL RESPIRATORY (INHALATION)
Refills: 0 | Status: DISCONTINUED | OUTPATIENT
Start: 2024-04-07 | End: 2024-04-10

## 2024-04-07 RX ORDER — DONEPEZIL HYDROCHLORIDE 10 MG/1
10 TABLET, FILM COATED ORAL AT BEDTIME
Refills: 0 | Status: DISCONTINUED | OUTPATIENT
Start: 2024-04-07 | End: 2024-04-10

## 2024-04-07 RX ORDER — HEPARIN SODIUM 5000 [USP'U]/ML
5500 INJECTION INTRAVENOUS; SUBCUTANEOUS ONCE
Refills: 0 | Status: COMPLETED | OUTPATIENT
Start: 2024-04-07 | End: 2024-04-07

## 2024-04-07 RX ORDER — OXYCODONE AND ACETAMINOPHEN 5; 325 MG/1; MG/1
1 TABLET ORAL ONCE
Refills: 0 | Status: DISCONTINUED | OUTPATIENT
Start: 2024-04-07 | End: 2024-04-07

## 2024-04-07 RX ORDER — TIOTROPIUM BROMIDE 18 UG/1
2 CAPSULE ORAL; RESPIRATORY (INHALATION) DAILY
Refills: 0 | Status: DISCONTINUED | OUTPATIENT
Start: 2024-04-07 | End: 2024-04-10

## 2024-04-07 RX ORDER — METRONIDAZOLE 500 MG
500 TABLET ORAL ONCE
Refills: 0 | Status: COMPLETED | OUTPATIENT
Start: 2024-04-07 | End: 2024-04-07

## 2024-04-07 RX ORDER — AMLODIPINE BESYLATE 2.5 MG/1
1.5 TABLET ORAL
Refills: 0 | DISCHARGE

## 2024-04-07 RX ORDER — AMLODIPINE BESYLATE 2.5 MG/1
7.5 TABLET ORAL AT BEDTIME
Refills: 0 | Status: DISCONTINUED | OUTPATIENT
Start: 2024-04-07 | End: 2024-04-10

## 2024-04-07 RX ORDER — TRAZODONE HCL 50 MG
0.5 TABLET ORAL
Refills: 0 | DISCHARGE

## 2024-04-07 RX ORDER — ALBUTEROL 90 UG/1
2 AEROSOL, METERED ORAL EVERY 6 HOURS
Refills: 0 | Status: DISCONTINUED | OUTPATIENT
Start: 2024-04-07 | End: 2024-04-10

## 2024-04-07 RX ORDER — DIPHENHYDRAMINE HCL 50 MG
25 CAPSULE ORAL ONCE
Refills: 0 | Status: DISCONTINUED | OUTPATIENT
Start: 2024-04-07 | End: 2024-04-10

## 2024-04-07 RX ORDER — AMLODIPINE BESYLATE 2.5 MG/1
1 TABLET ORAL
Refills: 0 | DISCHARGE

## 2024-04-07 RX ORDER — LANOLIN ALCOHOL/MO/W.PET/CERES
1 CREAM (GRAM) TOPICAL
Refills: 0 | DISCHARGE

## 2024-04-07 RX ORDER — METOPROLOL TARTRATE 50 MG
12.5 TABLET ORAL EVERY 12 HOURS
Refills: 0 | Status: DISCONTINUED | OUTPATIENT
Start: 2024-04-07 | End: 2024-04-10

## 2024-04-07 RX ORDER — LANOLIN ALCOHOL/MO/W.PET/CERES
10 CREAM (GRAM) TOPICAL AT BEDTIME
Refills: 0 | Status: DISCONTINUED | OUTPATIENT
Start: 2024-04-07 | End: 2024-04-10

## 2024-04-07 RX ORDER — CIPROFLOXACIN LACTATE 400MG/40ML
400 VIAL (ML) INTRAVENOUS ONCE
Refills: 0 | Status: COMPLETED | OUTPATIENT
Start: 2024-04-07 | End: 2024-04-07

## 2024-04-07 RX ADMIN — Medication 100 MILLIGRAM(S): at 18:41

## 2024-04-07 RX ADMIN — OXYCODONE AND ACETAMINOPHEN 1 TABLET(S): 5; 325 TABLET ORAL at 20:00

## 2024-04-07 RX ADMIN — Medication 200 MILLIGRAM(S): at 18:41

## 2024-04-07 RX ADMIN — PANTOPRAZOLE SODIUM 40 MILLIGRAM(S): 20 TABLET, DELAYED RELEASE ORAL at 20:11

## 2024-04-07 RX ADMIN — HEPARIN SODIUM 1200 UNIT(S)/HR: 5000 INJECTION INTRAVENOUS; SUBCUTANEOUS at 20:13

## 2024-04-07 RX ADMIN — HEPARIN SODIUM 5500 UNIT(S): 5000 INJECTION INTRAVENOUS; SUBCUTANEOUS at 20:12

## 2024-04-07 NOTE — H&P ADULT - ASSESSMENT
87y/o F w/ Mild Dementia (Baseline A&Ox2) w/ PMHx HTN, HLD and recent Left Femur Fracture s/p Fall s/p Left Juan-arthroscopy (2024 - on ASA and Lovenox SubQ for DVT ppx) brought in by EMS from nursing home for bloody stools for 5x days. Approximately last Wednesday, patient developed blood streaks on her stool.  Had a repeat CBC at the nursing home with 1 point drop in hemoglobin. Patient was noted to have left lower extremity edema which she states occured 3 days prior to arrival. Denies shortness of breath or chest pain. Patient denies abdominal pain. Denies vomiting, fevers or any other medical complaints. Found to have acute DVT of LLE and started on Heparin gtt as per Vascular as patient's LENA was negative and no signs of active bleeding.    Vitals: Temp 97.5F, /63, HR 57, RR 18, SpO2 96% on RA    Labs: Hgb 10.3 (previously 10.6), Cr 0.7 (at baseline),     Imagin. CT A/P w/ IV contrast showed Wall thickening and adjacent mild inflammatory change and fluid associated with sigmoid diverticulosis suspicious for diverticulitis. No definite evidence of abscess.  2. LE Venous Duplex shows DVT in left peroneal vein (pre-[facility] report)    In the ED:    Admitted to medicine for further management.   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 Found to have acute DVT of LLE and started on Heparin gtt as per Vascular as patient's LENA was negative and no signs of active bleeding. Admitted to medicine to manage acute LLE DVT iso suspected GI Bleed.    #Acute LLE Peroneal DVT - likely provoked d/t recent surgery  #Recent Left Femur Fracture s/p Fall s/p Left Juan-arthroscopy (March 2024 - on ASA and Lovenox SubQ for DVT ppx)   - LE Venous Duplex shows DVT in left peroneal vein (pre-[facility] report)  - seen by vascular - recommend AC - no surgical intervention at this time  - will c/w Heparin gtt iso suspected GI bleed  - vascular recommend to discharge on Eliquis 10mg BID for 1 week and 5mg BID for 3 months  - If patient is unable to tolerate therapeutic anticoagulation, recommend wrapping LLE including the foot with ace bandage and elevation  - f/u w/ vascular Dr. Castro in 3-4 weeks o/p    #Bloody Stools - possibly 2/2 hemorrhoids  - states blood streaked stools for 5x days  - was previously on ASA and Lovenox SubQ for DVT ppx  - Vitals: Temp 97.5F, /63, HR 57, RR 18, SpO2 96% on RA  - Hgb 10.3 (previously 10.6), BUN wnl  - LENA negative for blood, denies melena  - s/p PPI IV x1 in the ED  - c/w PPI IV BID  - f/u GI consult    1. CT A/P w/ IV contrast showed Wall thickening and adjacent mild inflammatory change and fluid associated with sigmoid diverticulosis suspicious for diverticulitis. No definite evidence of abscess.  - s/p Cipro/Flagyl IV x1    #HTN   - c/w home amlodipine 10 mg daily; holding HCTZ  -now on lopressor 12.5mg po q12h   -if BP rises can try ACE or ARB     #HLD  - c/w home lipitor 40 mg nightly    #Moderate-severe AS  -o/p cardio   -keep euvolemic  -c/w beta blocker and baby aspirin    #Dementia  - Basline A&Ox2  - c/w home donepezil 10 mg daily    #DVT ppx: Heparin gtt  #GI ppx: PPI IV BID  #Diet: clear liquid diet  #Activity: IAT  #Code Status: Full Code  #Dispo: from NH, acute 89y/o F w/ Mild Dementia (Baseline A&Ox2) w/ PMHx HTN, HLD, mod-severe Aortic Stenosis and recent Left Femur Fracture s/p Fall s/p Left Juan-arthroscopy (March 2024 - on ASA and Lovenox SubQ for DVT ppx) brought in by EMS from nursing home for bloody stools for 5x days. Found to have acute DVT of LLE and started on Heparin gtt as per Vascular as patient's LENA was negative and no signs of active bleeding. Admitted to medicine to manage acute LLE DVT iso suspected GI Bleed.    #Acute LLE Peroneal DVT - likely provoked d/t recent surgery  #Recent Left Femur Fracture s/p Fall s/p Left Juan-arthroscopy (March 2024 - on ASA and Lovenox SubQ for DVT ppx)   - LE Venous Duplex shows DVT in left peroneal vein (pre-[facility] report)  - seen by vascular - recommend AC - no surgical intervention at this time  - will c/w Heparin gtt iso suspected GI bleed  - vascular recommend to discharge on Eliquis 10mg BID for 1 week and 5mg BID for 3 months  - If patient is unable to tolerate therapeutic anticoagulation, recommend wrapping LLE including the foot with ace bandage and elevation  - f/u w/ vascular Dr. Castro in 3-4 weeks o/p    #Bloody Stools - possibly 2/2 hemorrhoids  - states blood streaked stools for 5x days  - was previously on ASA and Lovenox SubQ for DVT ppx  - Vitals: Temp 97.5F, /63, HR 57, RR 18, SpO2 96% on RA  - Hgb 10.3 (previously 10.6), BUN wnl  - LENA negative for blood, denies melena  - s/p PPI IV x1 in the ED  - c/w PPI IV BID  - f/u GI consult    1. CT A/P w/ IV contrast showed Wall thickening and adjacent mild inflammatory change and fluid associated with sigmoid diverticulosis suspicious for diverticulitis. No definite evidence of abscess.  - s/p Cipro/Flagyl IV x1    #HTN   - c/w home amlodipine 10 mg daily; holding HCTZ  -now on lopressor 12.5mg po q12h   -if BP rises can try ACE or ARB     #HLD  - c/w home lipitor 40 mg nightly    #Moderate-severe AS  -o/p cardio   -keep euvolemic  -c/w beta blocker and baby aspirin    #Dementia  - Baseline A&Ox2 (currently A&Ox1-2 to person and place)  - c/w home donepezil 10 mg daily    #DVT ppx: Heparin gtt  #GI ppx: PPI IV BID  #Diet: clear liquid diet  #Activity: IAT  #Code Status: Full Code  #Dispo: from NH, acute 89y/o F w/ Mild Dementia (Baseline A&Ox2) w/ PMHx HTN, HLD, mod-severe Aortic Stenosis and recent Left Femur Fracture s/p Fall s/p Left Juan-arthroscopy (March 2024 - on ASA and Lovenox SubQ for DVT ppx) brought in by EMS from nursing home for bloody stools for 5x days. Found to have acute DVT of LLE and started on Heparin gtt as per Vascular as patient's LENA was negative and no signs of active bleeding. Admitted to medicine to manage acute LLE DVT iso suspected GI Bleed.    #Bloody Stools - possibly 2/2 hemorrhoids  - states blood streaked stools for 5x days  - was previously on ASA and Lovenox SubQ for DVT ppx  - Vitals: Temp 97.5F, /63, HR 57, RR 18, SpO2 96% on RA  - Hgb 10.3 (previously 10.6), BUN wnl  - LENA negative for blood, denies melena  - s/p PPI IV x1 in the ED  - c/w PPI IV BID  - clear liquid diet for now  - home ASA 81mg on hold - resume as per GI  - f/u GI consult    #Acute LLE Peroneal DVT - likely provoked d/t recent surgery  #Recent Left Femur Fracture s/p Fall s/p Left Juan-arthroscopy (March 2024 - on ASA and Lovenox SubQ for DVT ppx)   - LE Venous Duplex shows DVT in left peroneal vein (pre-[facility] report)  - seen by vascular - recommend AC - no surgical intervention at this time  - will c/w Heparin gtt iso suspected GI bleed  - vascular recommend to discharge on Eliquis 10mg BID for 1 week and 5mg BID for 3 months  - If patient is unable to tolerate therapeutic anticoagulation, recommend wrapping LLE including the foot with ace bandage and elevation  - f/u w/ vascular Dr. Castro in 3-4 weeks o/p    #Wall Thickening and Adjacent Mild Inflammatory Change in the Sigmoid Colon  - CT A/P w/ IV contrast showed Wall thickening and adjacent mild inflammatory change and fluid associated with sigmoid diverticulosis suspicious for diverticulitis. No definite evidence of abscess  - s/p Cipro/Flagyl IV x1 in the ED  - afebrile, no diarrhea, no abdominal pain, no n/v  - tolerating PO intake  - monitor off abx    #Moderate-Severe AS  #HFpEF - not in exacerbation  - ECHO from 3/2024 shows EF 75% w/ GIIDD, severe AS, pulm HTN, mTR  - was previously on Lasix PO, no longer on diuretics  - c/w home Metoprolol 12.5mg BID  - keep euvolemic  - strict I&Os, daily weight    #HTN  #HLD  - c/w home Amlodipine 7.5mg qD  - c/w home Atorvastatin 40mg qhs    #Dementia  - Baseline A&Ox2 (currently A&Ox1-2 to person and place)  - c/w home donepezil 10mg qD    #DVT ppx: Heparin gtt  #GI ppx: PPI IV BID  #Diet: clear liquid diet  #Activity: IAT  #Code Status: DNR/DNI  #Dispo: from NH, acute 87y/o F w/ Mild Dementia (Baseline A&Ox2) w/ PMHx HTN, HLD, HFpEF (EF 75%, GIIDD, Pulm HTN and mod-severe Aortic Stenosis), recent Left Femur Fracture s/p Fall s/p Left Juan-arthroscopy (March 2024 - on ASA and Lovenox SubQ for DVT ppx), Anxiety and Insomnia brought in by EMS from nursing home for bloody stools for 5x days. Found to have acute DVT of LLE and started on Heparin gtt as per Vascular as patient's LENA was negative and no signs of active bleeding. Admitted to medicine to manage acute LLE DVT iso suspected GI Bleed.    #Bloody Stools - possibly 2/2 hemorrhoids  - states blood streaked stools for 5x days  - was previously on ASA and Lovenox SubQ for DVT ppx  - Vitals: Temp 97.5F, /63, HR 57, RR 18, SpO2 96% on RA  - Hgb 10.3 (previously 10.6), BUN wnl  - LENA negative for blood, denies melena  - s/p PPI IV x1 in the ED  - c/w PPI IV BID  - clear liquid diet for now  - home ASA 81mg on hold - resume as per GI  - f/u GI consult    #Acute LLE Peroneal DVT - likely provoked d/t recent surgery  #Recent Left Femur Fracture s/p Fall s/p Left Juan-arthroscopy (March 2024 - on ASA and Lovenox SubQ for DVT ppx)   - LE Venous Duplex shows DVT in left peroneal vein (pre-[facility] report)  - seen by vascular - recommend AC - no surgical intervention at this time  - will c/w Heparin gtt iso suspected GI bleed  - vascular recommend to discharge on Eliquis 10mg BID for 1 week and 5mg BID for 3 months  - If patient is unable to tolerate therapeutic anticoagulation, recommend wrapping LLE including the foot with ace bandage and elevation  - f/u w/ vascular Dr. Castro in 3-4 weeks o/p    #Wall Thickening and Adjacent Mild Inflammatory Change in the Sigmoid Colon  - CT A/P w/ IV contrast showed Wall thickening and adjacent mild inflammatory change and fluid associated with sigmoid diverticulosis suspicious for diverticulitis. No definite evidence of abscess  - s/p Cipro/Flagyl IV x1 in the ED  - afebrile, no diarrhea, no abdominal pain, no n/v  - tolerating PO intake  - monitor off abx    #HFpEF - not in exacerbation  #Moderate-Severe AS  #Pulm HTN  - ECHO from 3/2024 shows EF 75% w/ GIIDD, severe AS, pulm HTN, mTR  - was previously on Lasix PO, no longer on diuretics  - c/w home Metoprolol 12.5mg BID  - keep euvolemic  - strict I&Os, daily weight    #COPD vs Asthma? - not in exacerbation  - c/w home inhalers    #HTN  #HLD  - c/w home Amlodipine 7.5mg qD  - c/w home Atorvastatin 40mg qhs    #Dementia  #Anxiety  #Insomnia  - Baseline A&Ox2 (currently A&Ox1-2 to person and place)  - c/w home donepezil 10mg qD  - c/w home Trazodone 75mg qhs  - c/w home Melatonin 10mg qhs PRN for insomnia    #DVT ppx: Heparin gtt  #GI ppx: PPI IV BID  #Diet: clear liquid diet  #Activity: IAT  #Code Status: DNR/DNI  #Dispo: from NH, acute

## 2024-04-07 NOTE — ED ADULT NURSE NOTE - NSFALLHARMRISKINTERV_ED_ALL_ED

## 2024-04-07 NOTE — H&P ADULT - HISTORY OF PRESENT ILLNESS
87 yo F w/PMH of HTN, HLD, mild dementia Oriented x 2 at baseline, status post left Juan arthroscopy after left femur fracture previously on aspirin as well as prophylactic Lovenox brought in by EMS from nursing home for bloody stools.  Patient was initially constipated postop, was placed on senna and stool softener therefore for the last 3 weeks has had 5-6 episodes of loose bowel.  Approximately last Wednesday patient developed blood streaks on her stool.  Had a repeat CBC at the nursing home with 1 point drop in hemoglobin.  Patient denies abdominal pain.  Denies vomiting fevers or any other medical complaints.  Patient noted to have left lower extremity edema which she states recurred 3 days prior to arrival.  Denies shortness of breath or chest pain. 89y/o F w/ Mild Dementia (Baseline A&Ox2) w/ PMHx HTN, HLD, s/p left Juan arthroscopy after left femur fracture previously on aspirin as well as prophylactic Lovenox brought in by EMS from nursing home for bloody stools for 5x days. Patient was initially constipated postop, was placed on senna and stool softener therefore for the last 3 weeks has had 5-6 episodes of loose bowel.  Approximately last Wednesday patient developed blood streaks on her stool.  Had a repeat CBC at the nursing home with 1 point drop in hemoglobin.  Patient denies abdominal pain.  Denies vomiting fevers or any other medical complaints.  Patient noted to have left lower extremity edema which she states recurred 3 days prior to arrival.  Denies shortness of breath or chest pain. Found to have LLE edema +ve for DVT.     no blood on LENA however has had blood tinged stool x 5 days. CT abd/pelvis performed given hx of fever last night, will diverticulitis- given iv ABX. Vas surg evaluated patient, recommended AC. will admit for monitoring- on heparin drip. 87y/o F w/ Mild Dementia (Baseline A&Ox2) w/ PMHx HTN, HLD and recent Left Femur Fracture s/p Fall s/p Left Juan-arthroscopy (2024 - on ASA and Lovenox SubQ for DVT ppx) brought in by EMS from nursing home for bloody stools for 5x days. Approximately last Wednesday, patient developed blood streaks on her stool.  Had a repeat CBC at the nursing home with 1 point drop in hemoglobin. Patient was noted to have left lower extremity edema which she states occured 3 days prior to arrival. Denies shortness of breath or chest pain. Patient denies abdominal pain. Denies vomiting, fevers or any other medical complaints. Found to have acute DVT of LLE and started on Heparin gtt as per Vascular as patient's LENA was negative and no signs of active bleeding.    Vitals: Temp 97.5F, /63, HR 57, RR 18, SpO2 96% on RA    Labs: Hgb 10.3 (previously 10.6), Cr 0.7 (at baseline),     Imagin. CT A/P w/ IV contrast showed Wall thickening and adjacent mild inflammatory change and fluid associated with sigmoid diverticulosis suspicious for diverticulitis. No definite evidence of abscess.  2. LE Venous Duplex shows DVT in left peroneal vein (pre-[facility] report)    In the ED:    Admitted to medicine for further management. 89y/o F w/ Mild Dementia (Baseline A&Ox2) w/ PMHx HTN, HLD, mod-severe Aortic Stenosis and recent Left Femur Fracture s/p Fall s/p Left Juan-arthroscopy (2024 - on ASA and Lovenox SubQ for DVT ppx) brought in by EMS from nursing home for bloody stools for 5x days. Approximately last Wednesday, patient developed blood streaks on her stool.  Had a repeat CBC at the nursing home with 1 point drop in hemoglobin. Patient was noted to have left lower extremity edema which she states occured 3 days prior to arrival. Denies shortness of breath or chest pain. Patient denies abdominal pain. Denies vomiting, fevers or any other medical complaints. Found to have acute DVT of LLE and started on Heparin gtt as per Vascular as patient's LENA was negative and no signs of active bleeding.    Vitals: Temp 97.5F, /63, HR 57, RR 18, SpO2 96% on RA    Labs: Hgb 10.3 (previously 10.6), Cr 0.7 (at baseline),     Imagin. CT A/P w/ IV contrast showed Wall thickening and adjacent mild inflammatory change and fluid associated with sigmoid diverticulosis suspicious for diverticulitis. No definite evidence of abscess.  2. LE Venous Duplex shows DVT in left peroneal vein (pre-[facility] report)    In the ED:  - s/p Heparin gtt  - s/p Cipro/Flagyl IV x1  - s/p PPI IV x1    Admitted to medicine for further management of GI bleed and acute LLE DVT. 87y/o F w/ Mild Dementia (Baseline A&Ox2) w/ PMHx HTN, HLD, HFpEF (EF 75%, GIIDD, Pulm HTN and mod-severe Aortic Stenosis), recent Left Femur Fracture s/p Fall s/p Left Juan-arthroscopy (2024 - on ASA and Lovenox SubQ for DVT ppx), Anxiety and Insomnia brought in by EMS from nursing home for bloody stools for 5x days. Approximately last Wednesday, patient developed blood streaks on her stool.  Had a repeat CBC at the nursing home with 1 point drop in hemoglobin. Patient was noted to have left lower extremity edema which she states occurred 3 days prior to arrival. Denies shortness of breath or chest pain. Patient denies abdominal pain. Denies vomiting, fevers or any other medical complaints. Found to have acute DVT of LLE and started on Heparin gtt as per Vascular as patient's LENA was negative and no signs of active bleeding.    Vitals: Temp 97.5F, /63, HR 57, RR 18, SpO2 96% on RA    Labs: Hgb 10.3 (previously 10.6), Cr 0.7 (at baseline),     Imagin. CT A/P w/ IV contrast showed Wall thickening and adjacent mild inflammatory change and fluid associated with sigmoid diverticulosis suspicious for diverticulitis. No definite evidence of abscess.  2. LE Venous Duplex shows DVT in left peroneal vein (pre-[facility] report)    In the ED:  - s/p Heparin gtt  - s/p Cipro/Flagyl IV x1  - s/p PPI IV x1    Admitted to medicine for further management of GI bleed and acute LLE DVT.

## 2024-04-07 NOTE — H&P ADULT - ATTENDING COMMENTS
89y/o F w/ Mild Dementia (Baseline A&Ox2) w/ PMHx HTN, HLD, HFpEF (EF 75%, GIIDD, Pulm HTN and mod-severe Aortic Stenosis), recent Left Femur Fracture s/p Fall s/p Left Juan-arthroscopy (March 2024 - on ASA and Lovenox SubQ for DVT ppx), Anxiety and Insomnia brought in by EMS from nursing home for bloody stools for 5x days. Found to have acute DVT of LLE and started on Heparin gtt as per Vascular as patient's LENA was negative and no signs of active bleeding. Admitted to medicine to manage acute LLE DVT with  suspected GI Bleed.    Agree  with assessment  except for changes below.   Vital Signs Last 24 Hrs  T(C): 36.3 (07 Apr 2024 23:54), Max: 36.7 (07 Apr 2024 17:07)  T(F): 97.3 (07 Apr 2024 23:54), Max: 98 (07 Apr 2024 17:07)  HR: 57 (07 Apr 2024 23:54) (56 - 57)  BP: 163/74 (07 Apr 2024 23:54) (147/63 - 172/75)  BP(mean): --  RR: 18 (07 Apr 2024 17:07) (18 - 18)  SpO2: 96% (07 Apr 2024 23:54) (96% - 97%)    Parameters below as of 07 Apr 2024 23:54  Patient On (Oxygen Delivery Method): room air    CT A/P: Wall thickening and adjacent mild inflammatory change and fluid   associated with sigmoid diverticulosis suspicious for diverticulitis. No   definite evidence of abscess. Follow-up is recommended to exclude   neoplasm.    IMPRESSION   Suspected GIB  Possible Secondary to Hemorrhoids   Found to have Sigmoidal Diverticulitis   No signs of Active Bleeding   LENA negative for blood, denies melena  Clear liquid diet for now. NPO after midnight. IV fluid hydration and trend LA  Trend CBC closely BID and transfuse as needed (target Hb >8). Maintain active Type and screen  Please correct electrolytes (Target Na 135-145, Mg 1.7-2.2, K 3.5-5)  Check coagulation profile. Please correct INR to <1.5  Trend BUN  x2 large 18G IV Bores  On pantoprazole 40 IV BID can switch to PO Pantoprazole   Monitor BM and for recurrence of coffee ground emesis  Hold Lovenox for DVT  start Hep   Please avoid any NSAIDs  If any unstable bleed, please call GI stat  - s/p Cipro/Flagyl IV x1 in the ED  - afebrile, no diarrhea, no abdominal pain, no n/v  - tolerating PO intake  - monitor off abx      Acute LLE Peroneal DVT  Likely provoked d/t recent surgery  Recent Left Femur Fracture s/p Fall s/p Left Juan-arthroscopy (March 2024 - on ASA and Lovenox SubQ for DVT ppx)   - LE Venous Duplex shows DVT in left peroneal vein (pre-[facility] report)  - seen by vascular - recommend AC - no surgical intervention at this time  -c/w Heparin gtt iso suspected GI bleed  - Start on Hep Drip RN Driven protocol PTT goal  60-90  - Hold if  Signs  of Bleeding       Hx HFpEF Euvolemic   Hx Moderate-Severe AS  Hx Pulm HTN  - ECHO from 3/2024 shows EF 75% w/ GIIDD, severe AS, pulm HTN, mTR  - was previously on Lasix PO, no longer on diuretics  - c/w home Metoprolol 12.5mg BID  - keep euvolemic  - strict I&Os, daily weight      Hx HTN  Hx HLD  - c/w home Amlodipine 7.5mg qD  - c/w home Atorvastatin 40mg qhs    Hx Dementia  Hx Anxiety  Hx Insomnia  - Baseline A&Ox2 (currently A&Ox1-2 to person and place)  - c/w home donepezil 10mg qD  - c/w home Trazodone 75mg qhs 87y/o F w/ Mild Dementia (Baseline A&Ox2) w/ PMHx HTN, HLD, HFpEF (EF 75%, GIIDD, Pulm HTN and mod-severe Aortic Stenosis), recent Left Femur Fracture s/p Fall s/p Left Juan-arthroscopy (March 2024 - on ASA and Lovenox SubQ for DVT ppx), Anxiety and Insomnia brought in by EMS from nursing home for bloody stools for 5x days. Found to have acute DVT of LLE and started on Heparin gtt as per Vascular as patient's LENA was negative and no signs of active bleeding. Admitted to medicine to manage acute LLE DVT with  suspected GI Bleed.    Agree  with assessment  except for changes below.   Vital Signs Last 24 Hrs  T(C): 36.3 (07 Apr 2024 23:54), Max: 36.7 (07 Apr 2024 17:07)  T(F): 97.3 (07 Apr 2024 23:54), Max: 98 (07 Apr 2024 17:07)  HR: 57 (07 Apr 2024 23:54) (56 - 57)  BP: 163/74 (07 Apr 2024 23:54) (147/63 - 172/75)  BP(mean): --  RR: 18 (07 Apr 2024 17:07) (18 - 18)  SpO2: 96% (07 Apr 2024 23:54) (96% - 97%)    Parameters below as of 07 Apr 2024 23:54  Patient On (Oxygen Delivery Method): room air    CT A/P: Wall thickening and adjacent mild inflammatory change and fluid   associated with sigmoid diverticulosis suspicious for diverticulitis. No   definite evidence of abscess. Follow-up is recommended to exclude   neoplasm.    PHYSICAL EXAM  GENERAL: NAD,  HEAD:  NCAT, EOMI, MM  NECK: Supple, Nontender  NERVOUS SYSTEM:  AAOx1, NFD  CHEST/LUNG: +bs b/l, No wheezing   HEART: +s1s2 RRR  ABDOMEN: soft, NT/ND  EXTREMITIES:  pp, no edema  SKIN: age related skin changes     IMPRESSION   Suspected GIB  Possible Secondary to Hemorrhoids   Found to have Sigmoidal Diverticulitis   No signs of Active Bleeding   LENA negative for blood, denies melena  Clear liquid diet for now. NPO after midnight. IV fluid hydration and trend LA  Trend CBC closely BID and transfuse as needed (target Hb >8). Maintain active Type and screen  Please correct electrolytes (Target Na 135-145, Mg 1.7-2.2, K 3.5-5)  Check coagulation profile. Please correct INR to <1.5  Trend BUN  x2 large 18G IV Bores  On pantoprazole 40 IV BID can switch to PO Pantoprazole   Monitor BM and for recurrence of coffee ground emesis  Hold Lovenox for DVT  start Hep   Please avoid any NSAIDs  If any unstable bleed, please call GI stat  - s/p Cipro/Flagyl IV x1 in the ED  - afebrile, no diarrhea, no abdominal pain, no n/v  - tolerating PO intake  - monitor off abx      Acute LLE Peroneal DVT  Likely provoked d/t recent surgery  Recent Left Femur Fracture s/p Fall s/p Left Juan-arthroscopy (March 2024 - on ASA and Lovenox SubQ for DVT ppx)   - LE Venous Duplex shows DVT in left peroneal vein (pre-[facility] report)  - seen by vascular - recommend AC - no surgical intervention at this time  -c/w Heparin gtt iso suspected GI bleed  - Start on Hep Drip RN Driven protocol PTT goal  60-90  - Hold if  Signs  of Bleeding       Hx HFpEF Euvolemic   Hx Moderate-Severe AS  Hx Pulm HTN  - ECHO from 3/2024 shows EF 75% w/ GIIDD, severe AS, pulm HTN, mTR  - was previously on Lasix PO, no longer on diuretics  - c/w home Metoprolol 12.5mg BID  - keep euvolemic  - strict I&Os, daily weight      Hx HTN  Hx HLD  - c/w home Amlodipine 7.5mg qD  - c/w home Atorvastatin 40mg qhs    Hx Dementia  Hx Anxiety  Hx Insomnia  - Baseline A&Ox2 (currently A&Ox1-2 to person and place)  - c/w home donepezil 10mg qD  - c/w home Trazodone 75mg qhs    Seen 04/07

## 2024-04-07 NOTE — H&P ADULT - HIV OFFER
[FreeTextEntry1] : 51 yo female with large 21 cm right/midline pelvic mass and pelvic pain. US today with uterus measuring 4.0 x 2.5 x 3.2 cm, endometrial thickness 3.56 mm, large cystic mass with internal complex areas measuring 22 x 15 x 12 cm (stable). Color flow surrounding mass with a RI of 0.46, unclear if ovarian. Advised patient surgical intervention is recommended at this time with CINDI BSO FS possible staging. We discussed it is not common for pelvic mass to be this large and malignant, however it is impossible to definitely say without surgical pathology. \par \par I discussed at length with the patient the nature, purpose, risks, benefits, and alternatives of total abdominal hysterectomy and bilateral salpingo-oophorectomy via a vertical, midline incision, possible bilateral pelvic and para-aortic lymphadenectomy and surgical staging.  The patient understands the risks to include (but not be limited to) bowel injury, bleeding (with the possible need for transfusion), bladder or ureteral injury, infections, protracted wound closure, deep venous thrombosis, and gudelia-operative death.  She is also aware of  the possibility of  a unrecognized surgical complication with need for subsequent re-exploration.  She also understands the rationale for surgical procedures such as omentectomy, or pelvic and para-aortic lymphadenectomy for the proper staging of a gynecological cancer.  She agrees to proceed.  She asked numerous questions which were answered to her satisfaction.  She understands the need for a pre-operative bowel preparation and agrees to comply with our instructions. Opt out

## 2024-04-07 NOTE — ED ADULT NURSE NOTE - TEMPLATE LIST FOR HEAD TO TOE ASSESSMENT
TRANSFER - OUT REPORT:  
 
Verbal report given to: April Trice. Report consisted of patient's Situation, Background, Assessment and  
Recommendations(SBAR). Opportunity for questions and clarification was provided. Patient transported with a Registered Nurse and [address] [place] [address] / [facility]. Patient transported to: ICU 10. Abdominal Pain, N/V/D

## 2024-04-07 NOTE — ED PROVIDER NOTE - CLINICAL SUMMARY MEDICAL DECISION MAKING FREE TEXT BOX
89 yo F w/PMH of HTN, HLD, mild dementia Oriented x 2 at baseline, status post left Juan arthroscopy after left femur fracture previously on aspirin as well as prophylactic Lovenox brought in by EMS from nursing home for bloody stools as well as LLE edema. Labs stable. +DVT to the LLE. no blood on LENA however has had blood tinged stool x 5 days. CT abd/pelvis performed given hx of fever last night, will diverticulitis- given iv ABX. Vas surg evaluated patient, recommended AC. will admit for monitoring- on heparin drip.

## 2024-04-07 NOTE — H&P ADULT - CONVERSATION DETAILS
Spoke to Daughter Matilda over the phone. Discussed diagnosis. Daughter understands the diagnosis and treatment. States she also had previously signed a DNR/DNI at the nursing home 2 weeks ago. States that it was her mother's wishes to not receive CPR and not to be intubated. If that time comes, patient wants to be comfortable. Patient is A&Ox2, so daughter was called and she confirmed the above. Trial of NIV, fluids, medical treatment are all okay. If need for feeding tube or dialysis, will discuss further when the time comes. Spoke to Daughter Matilda over the phone. Discussed diagnosis. Daughter understands the diagnosis and treatment. States she also had previously signed a DNR/DNI at the nursing home 2 weeks ago. States that it was her mother's wishes to not receive CPR and not to be intubated. If that time comes, patient wants to be comfortable. Patient is A&Ox2, so daughter was called and she confirmed the above. Trial of NIV, fluids, medical treatment are all okay. If need for feeding tube or dialysis, will discuss further when the time comes.    Advance care Planing   I have spent 30 minutes with this patient. Over 50% of the encounter was spent in counseling on and coordination of patient care. The above recommendations were discussed with the patient. The patient and or family had all questions answered and expressed understanding of the plan.

## 2024-04-07 NOTE — CONSULT NOTE ADULT - SUBJECTIVE AND OBJECTIVE BOX
VASCULAR SURGERY CONSULT NOTE      HPI: Patient is a 88F w/ pmhx of left femur fracture s/p open hemiarthroplasty 3/6/24, HTN, HLD, mild dementia, smoking, laparoscopic cholecystectomy, hysterectomy, COPD, anxiety, aortic valve stenosis who presents complaining of blood in her stools. Patient was discharged postoperatively to a rehab facility on LVX 40 qd dvt ppx for 6 weeks. She noticed bright red blood in her bowel movements after wiping starting approximately 3 days ago, at that time her DVT ppx was held and the next day she noticed swelling and pain in her left lower extremity which has been persistent. Patient and her daughter state that she had a duplex ultrasound performed in the rehab about two weeks ago which was negative. A b/lo LE venous duplex was performed in the ED which demonstrated a LLE peroneal DVT on wet read. She denies any nausea, vomiting, abdominal pain, chest pain, fevers, weakness, numbness or tingling in any of her extremities or shortness of breath. She endorses diarrhea, but states she has been taking laxatives since leaving the hospital postoperatively. Denies any pmhx of CAD, MI, stroke, cancer, diabetes.         PAST MEDICAL & SURGICAL HISTORY:  HTN (hypertension)  Hypercholesteremia  Heavy cigarette smoker  History of laparoscopic cholecystectomy  H/O: hysterectomy  History of surgery  LEFT EYE CATARACT EXTRACTION WITH LENS  IMPLANT    No Known Allergies    Home Medications:  albuterol 90 mcg/inh inhalation aerosol: 2 puff(s) inhaled every 6 hours As needed Shortness of Breath and/or Wheezing (11 Mar 2024 11:23)  amLODIPine 10 mg oral tablet: 1 tab(s) orally once a day (06 Mar 2024 06:56)  aspirin 81 mg oral tablet, chewable: 1 tab(s) orally once a day (11 Mar 2024 11:26)  budesonide-formoterol 160 mcg-4.5 mcg/inh inhalation aerosol: 2 puff(s) inhaled 2 times a day (11 Mar 2024 11:23)  donepezil 10 mg oral tablet: 1 tab(s) orally once a day (06 Mar 2024 06:57)  furosemide 40 mg oral tablet: 1 tab(s) orally once a day For 5 days and stop on 3/15/2024 (11 Mar 2024 11:23)  Lipitor 40 mg oral tablet: 1 tab(s) orally once a day (27 May 2022 08:49)  metoprolol: 12.5 milligram(s) orally 2 times a day (11 Mar 2024 11:26)  oxyCODONE 5 mg oral tablet: 1 tab(s) orally every 6 hours as needed for Moderate Pain (4 - 6) (11 Mar 2024 11:26)  polyethylene glycol 3350 oral powder for reconstitution: 17 gram(s) orally 2 times a day (11 Mar 2024 11:23)  senna leaf extract oral tablet: 2 tab(s) orally once a day (at bedtime) (11 Mar 2024 11:23)    No pertinent family history of PVD    REVIEW OF SYSTEMS:    12 point ROS otherwise normal except as stated in HPI    PHYSICAL EXAM  Vital Signs Last 24 Hrs  T(C): 36.7 (07 Apr 2024 17:07), Max: 36.7 (07 Apr 2024 17:07)  T(F): 98 (07 Apr 2024 17:07), Max: 98 (07 Apr 2024 17:07)  HR: 56 (07 Apr 2024 17:07) (56 - 57)  BP: 172/75 (07 Apr 2024 17:07) (147/63 - 172/75)  BP(mean): --  RR: 18 (07 Apr 2024 17:07) (18 - 18)  SpO2: 97% (07 Apr 2024 17:07) (96% - 97%)    Parameters below as of 07 Apr 2024 13:05  Patient On (Oxygen Delivery Method): room air        Appearance: Normal	  HEENT:   Normal oral mucosa, PERRL, EOMI	  Neck: Supple, - JVD; Carotid Bruit   Cardiovascular: Regular rate   Respiratory: Equal chest rise bilaterally, no audible wheezing  Gastrointestinal:  Soft, Non-tender, positive BS	  Skin: No rashes, No ecchymoses, No cyanosis  Extremities: Normal range of motion, No clubbing, cyanosis. LLE swelling present below the knee, pitting edema at the ankle.   Vascular: Peripheral pulses palpable 2+ bilateral lower extremities.   Neurologic: Non-focal  Psychiatry: A & O x 3, Mood & affect appropriate    MEDICATIONS:   MEDICATIONS  (STANDING):    MEDICATIONS  (PRN):      LAB/STUDIES:                        10.3   3.89  )-----------( 155      ( 07 Apr 2024 14:14 )             32.0     04-07    135  |  104  |  15  ----------------------------<  104<H>  3.9   |  22  |  0.7    Ca    8.4      07 Apr 2024 14:14    TPro  5.6<L>  /  Alb  3.3<L>  /  TBili  0.3  /  DBili  x   /  AST  12  /  ALT  14  /  AlkPhos  73  04-07    PT/INR - ( 07 Apr 2024 14:14 )   PT: 12.90 sec;   INR: 1.13 ratio         PTT - ( 07 Apr 2024 14:14 )  PTT:31.6 sec  LIVER FUNCTIONS - ( 07 Apr 2024 14:14 )  Alb: 3.3 g/dL / Pro: 5.6 g/dL / ALK PHOS: 73 U/L / ALT: 14 U/L / AST: 12 U/L / GGT: x             Urinalysis Basic - ( 07 Apr 2024 14:14 )    Color: x / Appearance: x / SG: x / pH: x  Gluc: 104 mg/dL / Ketone: x  / Bili: x / Urobili: x   Blood: x / Protein: x / Nitrite: x   Leuk Esterase: x / RBC: x / WBC x   Sq Epi: x / Non Sq Epi: x / Bacteria: x      IMAGING:    no new imaging. CTAP and b/l LE venous duplex pending     VASCULAR SURGERY CONSULT NOTE      HPI: Patient is a 88F w/ pmhx of left femur fracture s/p open hemiarthroplasty 3/6/24, HTN, HLD, mild dementia, smoking, laparoscopic cholecystectomy, hysterectomy, COPD, anxiety, aortic valve stenosis who presents complaining of blood in her stools. Patient was discharged postoperatively to a rehab facility on LVX 40 qd dvt ppx for 6 weeks. She noticed bright red blood in her bowel movements after wiping starting approximately 3 days ago, at that time her DVT ppx was held and the next day she noticed swelling and pain in her left lower extremity which has been persistent. Patient and her daughter state that she had a duplex ultrasound performed in the rehab about two weeks ago which was negative. A b/lo LE venous duplex was performed in the ED which demonstrated a LLE peroneal DVT on wet read. She denies any nausea, vomiting, abdominal pain, chest pain, fevers, weakness, numbness or tingling in any of her extremities or shortness of breath. She endorses diarrhea, but states she has been taking laxatives since leaving the hospital postoperatively. Denies any pmhx of CAD, MI, stroke, cancer, diabetes.         PAST MEDICAL & SURGICAL HISTORY:  HTN (hypertension)  Hypercholesteremia  Heavy cigarette smoker  History of laparoscopic cholecystectomy  H/O: hysterectomy  History of surgery  LEFT EYE CATARACT EXTRACTION WITH LENS  IMPLANT    No Known Allergies    Home Medications:  albuterol 90 mcg/inh inhalation aerosol: 2 puff(s) inhaled every 6 hours As needed Shortness of Breath and/or Wheezing (11 Mar 2024 11:23)  amLODIPine 10 mg oral tablet: 1 tab(s) orally once a day (06 Mar 2024 06:56)  aspirin 81 mg oral tablet, chewable: 1 tab(s) orally once a day (11 Mar 2024 11:26)  budesonide-formoterol 160 mcg-4.5 mcg/inh inhalation aerosol: 2 puff(s) inhaled 2 times a day (11 Mar 2024 11:23)  donepezil 10 mg oral tablet: 1 tab(s) orally once a day (06 Mar 2024 06:57)  furosemide 40 mg oral tablet: 1 tab(s) orally once a day For 5 days and stop on 3/15/2024 (11 Mar 2024 11:23)  Lipitor 40 mg oral tablet: 1 tab(s) orally once a day (27 May 2022 08:49)  metoprolol: 12.5 milligram(s) orally 2 times a day (11 Mar 2024 11:26)  oxyCODONE 5 mg oral tablet: 1 tab(s) orally every 6 hours as needed for Moderate Pain (4 - 6) (11 Mar 2024 11:26)  polyethylene glycol 3350 oral powder for reconstitution: 17 gram(s) orally 2 times a day (11 Mar 2024 11:23)  senna leaf extract oral tablet: 2 tab(s) orally once a day (at bedtime) (11 Mar 2024 11:23)    No pertinent family history of PVD    REVIEW OF SYSTEMS:    12 point ROS otherwise normal except as stated in HPI    PHYSICAL EXAM  Vital Signs Last 24 Hrs  T(C): 36.7 (07 Apr 2024 17:07), Max: 36.7 (07 Apr 2024 17:07)  T(F): 98 (07 Apr 2024 17:07), Max: 98 (07 Apr 2024 17:07)  HR: 56 (07 Apr 2024 17:07) (56 - 57)  BP: 172/75 (07 Apr 2024 17:07) (147/63 - 172/75)  BP(mean): --  RR: 18 (07 Apr 2024 17:07) (18 - 18)  SpO2: 97% (07 Apr 2024 17:07) (96% - 97%)    Parameters below as of 07 Apr 2024 13:05  Patient On (Oxygen Delivery Method): room air        Appearance: Normal	  HEENT:   Normal oral mucosa, PERRL, EOMI	  Neck: Supple, - JVD; Carotid Bruit   Cardiovascular: Regular rate   Respiratory: Equal chest rise bilaterally, no audible wheezing  Gastrointestinal:  Soft, Non-tender, positive BS	  Skin: No rashes, No ecchymoses, No cyanosis  Extremities: Normal range of motion, No clubbing, cyanosis. LLE swelling present below the knee, pitting edema at the ankle.   Vascular: Peripheral pulses palpable 2+ bilateral lower extremities.   Neurologic: Non-focal  Psychiatry: A & O x 3, Mood & affect appropriate    MEDICATIONS:   MEDICATIONS  (STANDING):    MEDICATIONS  (PRN):      LAB/STUDIES:                        10.3   3.89  )-----------( 155      ( 07 Apr 2024 14:14 )             32.0     04-07    135  |  104  |  15  ----------------------------<  104<H>  3.9   |  22  |  0.7    Ca    8.4      07 Apr 2024 14:14    TPro  5.6<L>  /  Alb  3.3<L>  /  TBili  0.3  /  DBili  x   /  AST  12  /  ALT  14  /  AlkPhos  73  04-07    PT/INR - ( 07 Apr 2024 14:14 )   PT: 12.90 sec;   INR: 1.13 ratio         PTT - ( 07 Apr 2024 14:14 )  PTT:31.6 sec  LIVER FUNCTIONS - ( 07 Apr 2024 14:14 )  Alb: 3.3 g/dL / Pro: 5.6 g/dL / ALK PHOS: 73 U/L / ALT: 14 U/L / AST: 12 U/L / GGT: x             Urinalysis Basic - ( 07 Apr 2024 14:14 )    Color: x / Appearance: x / SG: x / pH: x  Gluc: 104 mg/dL / Ketone: x  / Bili: x / Urobili: x   Blood: x / Protein: x / Nitrite: x   Leuk Esterase: x / RBC: x / WBC x   Sq Epi: x / Non Sq Epi: x / Bacteria: x      IMAGING:    < from: CT Abdomen and Pelvis w/ IV Cont (04.07.24 @ 16:46) >  Wall thickening and adjacent mild inflammatory change and fluid   associated with sigmoid diverticulosis suspicious for diverticulitis. No   definite evidence of abscess. Follow-up is recommended to exclude   neoplasm.    < end of copied text >  < from: [facility] Duplex Lower Ext Vein Scan, Bilat (04.07.24 @ 16:23) >  Right lower extremity negative for DVT.    DVT in left peroneal vein.    < end of copied text >

## 2024-04-07 NOTE — ED PROVIDER NOTE - OBJECTIVE STATEMENT
87 yo F w/PMH of HTN, HLD, mild dementia Oriented x 2 at baseline, status post left Juan arthroscopy after left femur fracture previously on aspirin as well as prophylactic Lovenox brought in by EMS from nursing home for bloody stools.  Patient was initially constipated postop, was placed on senna and stool softener therefore for the last 3 weeks has had 5-6 episodes of loose bowel.  Approximately last Wednesday patient developed blood streaks on her stool.  Had a repeat CBC at the nursing home with 1 point drop in hemoglobin.  Patient denies abdominal pain.  Denies vomiting fevers or any other medical complaints.  Patient noted to have left lower extremity edema which she states recurred 3 days prior to arrival.  Denies shortness of breath or chest pain.

## 2024-04-07 NOTE — ED PROVIDER NOTE - PHYSICAL EXAMINATION
CONSTITUTIONAL: Well-developed; well-nourished; in no acute distress.   SKIN: warm, dry  HEAD: Normocephalic; atraumatic.  EYES: PERRL, EOMI, no conjunctival erythema  ENT: No nasal discharge; airway clear.  NECK: Supple; non tender.  CARD: S1, S2 normal; no murmurs, gallops, or rubs. Regular rate and rhythm.   RESP: No wheezes, rales or rhonchi.  ABD: soft ntnd  RECTAl: chaperoned by CHUCKY morales, small external hemorrhoid, empty vault, no evidence of active bleed. picture of stool from [facility], is brown stool with blood on wipe.   EXT: Normal ROM.  No clubbing, cyanosis or edema.   NEURO: Alert, oriented, grossly unremarkable  PSYCH: Cooperative, appropriate.

## 2024-04-07 NOTE — ED PROVIDER NOTE - WET READ LAUNCH FT
Immunization order cancelled and chart closed.  Thank you. Yvette Sanchez R.N.     There are no Wet Read(s) to document.

## 2024-04-07 NOTE — ED ADULT NURSE NOTE - CAS EDP DISCH TYPE
----- Message from Sandra Fine sent at 8/2/2023  9:22 AM EDT -----  Subject: Message to Provider    QUESTIONS  Information for Provider? Patient requests call back for update on her   orders for a sleep study. Also she would like to know if the office does   birth control shots.   ---------------------------------------------------------------------------  --------------  Luiz Kiowa Luisa  [phone]; OK to leave message on voicemail  ---------------------------------------------------------------------------  --------------  SCRIPT ANSWERS  Relationship to Patient?  [facility]

## 2024-04-07 NOTE — CONSULT NOTE ADULT - ASSESSMENT
ASSESSMENT:  Patient is a 88F w/ pmhx of left femur fracture s/p open hemiarthroplasty 3/6/24, HTN, HLD, mild dementia, smoking, laparoscopic cholecystectomy, hysterectomy, COPD, anxiety, aortic valve stenosis who presents complaining of blood in her stools. Patient was discharged postoperatively to a rehab facility on LVX 40 qd dvt ppx for 6 weeks. She noticed bright red blood in her bowel movements after wiping starting approximately 3 days ago, at that time her DVT ppx was held and the next day she noticed swelling and pain in her left lower extremity which has been persistent.    Vascular surgery consulted for LLE peroneal DVT     PLAN:   -   -   -   -   - Patient seen/examined or Plan Discussed with Fellow,    - Plan to be discussed with Attending,   ASSESSMENT:  Patient is a 88F w/ pmhx of left femur fracture s/p open hemiarthroplasty 3/6/24, HTN, HLD, mild dementia, smoking, laparoscopic cholecystectomy, hysterectomy, COPD, anxiety, aortic valve stenosis who presents complaining of blood in her stools. Patient was discharged postoperatively to a rehab facility on LVX 40 qd dvt ppx for 6 weeks. She noticed bright red blood in her bowel movements after wiping starting approximately 3 days ago, at that time her DVT ppx was held and the next day she noticed swelling and pain in her left lower extremity which has been persistent.    Vascular surgery consulted for LLE peroneal DVT     PLAN:   - Patient denies any past medical history of DVTs, blood clots  - Likely has provoked DVT in LLE peroneal vein, symptomatic with swelling and pain in setting of recent surgery.   - Recommend therapeutic anticoagulation with heparin gtt, adjusted per nomogram if patient being admitted, Eliquis 10 BID x1 week, then 5 BID for total of three months if being discharged.   - Explained risks and benefits of starting therapeutic anticoagulation medication for DVT, as well as need to return for evaluation if she experiences any bleeding that does not stop, falls, or head strikes.   - No acute vascular surgical intervention  - If patient is unable to tolerate therapeutic anticoagulation, recommend wrapping LLE including the foot with ace bandage and elevation.  - Patient may follow up outpatient with Dr. Castro in 3-4 weeks.    - Dispo per ED    - Patient seen/examined or Plan Discussed with Fellow, Dr. Franco  - Plan to be discussed with Attending, Dr. Castro

## 2024-04-07 NOTE — H&P ADULT - NSHPLABSRESULTS_GEN_ALL_CORE
LABS:  cret                        10.3   3.89  )-----------( 155      ( 07 Apr 2024 14:14 )             32.0     04-07    135  |  104  |  15  ----------------------------<  104<H>  3.9   |  22  |  0.7    Ca    8.4      07 Apr 2024 14:14    TPro  5.6<L>  /  Alb  3.3<L>  /  TBili  0.3  /  DBili  x   /  AST  12  /  ALT  14  /  AlkPhos  73  04-07    PT/INR - ( 07 Apr 2024 14:14 )   PT: 12.90 sec;   INR: 1.13 ratio         PTT - ( 07 Apr 2024 14:14 )  PTT:31.6 sec

## 2024-04-07 NOTE — H&P ADULT - NSHPPHYSICALEXAM_GEN_ALL_CORE
VITALS:   Vital Signs Last 24 Hrs  T(C): 36.7 (07 Apr 2024 17:07), Max: 36.7 (07 Apr 2024 17:07)  T(F): 98 (07 Apr 2024 17:07), Max: 98 (07 Apr 2024 17:07)  HR: 56 (07 Apr 2024 17:07) (56 - 57)  BP: 172/75 (07 Apr 2024 17:07) (147/63 - 172/75)  RR: 18 (07 Apr 2024 17:07) (18 - 18)  SpO2: 97% (07 Apr 2024 17:07) (96% - 97%)      Parameters below as of 07 Apr 2024 13:05  Patient On (Oxygen Delivery Method): room air      PHYSICAL EXAM:  General: WN/WD NAD  HEENT: PERRLA, EOMI, moist mucous membranes  Neurology: A&Ox3, nonfocal, SCHULTZ x 4  Respiratory: CTA B/L, normal respiratory effort, no wheezes, crackles, rales  CV: RRR, S1S2, no murmurs, rubs or gallops  Abdominal: Soft, NT, ND +BS, Last BM  Extremities: No edema, + peripheral pulses VITALS:   Vital Signs Last 24 Hrs  T(C): 36.7 (07 Apr 2024 17:07), Max: 36.7 (07 Apr 2024 17:07)  T(F): 98 (07 Apr 2024 17:07), Max: 98 (07 Apr 2024 17:07)  HR: 56 (07 Apr 2024 17:07) (56 - 57)  BP: 172/75 (07 Apr 2024 17:07) (147/63 - 172/75)  RR: 18 (07 Apr 2024 17:07) (18 - 18)  SpO2: 97% (07 Apr 2024 17:07) (96% - 97%)      Parameters below as of 07 Apr 2024 13:05  Patient On (Oxygen Delivery Method): room air      PHYSICAL EXAM:  General: in NAD  HEENT: PERRLA, EOMI, moist mucous membranes  Neurology: A&Ox1-2 (person and place)  Respiratory: CTA B/L, normal respiratory effort, no wheezes, crackles, rales  CV: RRR, S1S2, no murmurs, rubs or gallops  Abdominal: Soft, NT, ND, +BS, no guarding or rebound  Extremities: RLE trace edema, LLE +1 pitting edema, L>R, +2 peripheral pulses in all 4 extremities

## 2024-04-08 LAB
ANION GAP SERPL CALC-SCNC: 12 MMOL/L — SIGNIFICANT CHANGE UP (ref 7–14)
APPEARANCE UR: CLEAR — SIGNIFICANT CHANGE UP
APTT BLD: 118.5 SEC — CRITICAL HIGH (ref 27–39.2)
APTT BLD: 79.1 SEC — CRITICAL HIGH (ref 27–39.2)
APTT BLD: 83 SEC — CRITICAL HIGH (ref 27–39.2)
BILIRUB UR-MCNC: NEGATIVE — SIGNIFICANT CHANGE UP
BUN SERPL-MCNC: 10 MG/DL — SIGNIFICANT CHANGE UP (ref 10–20)
CALCIUM SERPL-MCNC: 8.5 MG/DL — SIGNIFICANT CHANGE UP (ref 8.4–10.4)
CHLORIDE SERPL-SCNC: 106 MMOL/L — SIGNIFICANT CHANGE UP (ref 98–110)
CO2 SERPL-SCNC: 23 MMOL/L — SIGNIFICANT CHANGE UP (ref 17–32)
COLOR SPEC: YELLOW — SIGNIFICANT CHANGE UP
CREAT SERPL-MCNC: 0.6 MG/DL — LOW (ref 0.7–1.5)
DIFF PNL FLD: NEGATIVE — SIGNIFICANT CHANGE UP
EGFR: 87 ML/MIN/1.73M2 — SIGNIFICANT CHANGE UP
GLUCOSE BLDC GLUCOMTR-MCNC: 110 MG/DL — HIGH (ref 70–99)
GLUCOSE BLDC GLUCOMTR-MCNC: 119 MG/DL — HIGH (ref 70–99)
GLUCOSE SERPL-MCNC: 90 MG/DL — SIGNIFICANT CHANGE UP (ref 70–99)
GLUCOSE UR QL: NEGATIVE MG/DL — SIGNIFICANT CHANGE UP
HCT VFR BLD CALC: 31.6 % — LOW (ref 37–47)
HCT VFR BLD CALC: 34 % — LOW (ref 37–47)
HGB BLD-MCNC: 10.5 G/DL — LOW (ref 12–16)
HGB BLD-MCNC: 10.8 G/DL — LOW (ref 12–16)
KETONES UR-MCNC: NEGATIVE MG/DL — SIGNIFICANT CHANGE UP
LEUKOCYTE ESTERASE UR-ACNC: NEGATIVE — SIGNIFICANT CHANGE UP
MAGNESIUM SERPL-MCNC: 1.8 MG/DL — SIGNIFICANT CHANGE UP (ref 1.8–2.4)
MCHC RBC-ENTMCNC: 27.7 PG — SIGNIFICANT CHANGE UP (ref 27–31)
MCHC RBC-ENTMCNC: 28.6 PG — SIGNIFICANT CHANGE UP (ref 27–31)
MCHC RBC-ENTMCNC: 31.8 G/DL — LOW (ref 32–37)
MCHC RBC-ENTMCNC: 33.2 G/DL — SIGNIFICANT CHANGE UP (ref 32–37)
MCV RBC AUTO: 86.1 FL — SIGNIFICANT CHANGE UP (ref 81–99)
MCV RBC AUTO: 87.2 FL — SIGNIFICANT CHANGE UP (ref 81–99)
NITRITE UR-MCNC: NEGATIVE — SIGNIFICANT CHANGE UP
NRBC # BLD: 0 /100 WBCS — SIGNIFICANT CHANGE UP (ref 0–0)
NRBC # BLD: 0 /100 WBCS — SIGNIFICANT CHANGE UP (ref 0–0)
PH UR: 5.5 — SIGNIFICANT CHANGE UP (ref 5–8)
PLATELET # BLD AUTO: 150 K/UL — SIGNIFICANT CHANGE UP (ref 130–400)
PLATELET # BLD AUTO: 181 K/UL — SIGNIFICANT CHANGE UP (ref 130–400)
PMV BLD: 10.1 FL — SIGNIFICANT CHANGE UP (ref 7.4–10.4)
PMV BLD: 10.2 FL — SIGNIFICANT CHANGE UP (ref 7.4–10.4)
POTASSIUM SERPL-MCNC: 3.7 MMOL/L — SIGNIFICANT CHANGE UP (ref 3.5–5)
POTASSIUM SERPL-SCNC: 3.7 MMOL/L — SIGNIFICANT CHANGE UP (ref 3.5–5)
PROT UR-MCNC: NEGATIVE MG/DL — SIGNIFICANT CHANGE UP
RBC # BLD: 3.67 M/UL — LOW (ref 4.2–5.4)
RBC # BLD: 3.9 M/UL — LOW (ref 4.2–5.4)
RBC # FLD: 15.4 % — HIGH (ref 11.5–14.5)
RBC # FLD: 15.8 % — HIGH (ref 11.5–14.5)
SODIUM SERPL-SCNC: 141 MMOL/L — SIGNIFICANT CHANGE UP (ref 135–146)
SP GR SPEC: >1.03 — HIGH (ref 1–1.03)
UROBILINOGEN FLD QL: 1 MG/DL — SIGNIFICANT CHANGE UP (ref 0.2–1)
WBC # BLD: 5.75 K/UL — SIGNIFICANT CHANGE UP (ref 4.8–10.8)
WBC # BLD: 7.23 K/UL — SIGNIFICANT CHANGE UP (ref 4.8–10.8)
WBC # FLD AUTO: 5.75 K/UL — SIGNIFICANT CHANGE UP (ref 4.8–10.8)
WBC # FLD AUTO: 7.23 K/UL — SIGNIFICANT CHANGE UP (ref 4.8–10.8)

## 2024-04-08 PROCEDURE — 99223 1ST HOSP IP/OBS HIGH 75: CPT

## 2024-04-08 PROCEDURE — 99222 1ST HOSP IP/OBS MODERATE 55: CPT

## 2024-04-08 PROCEDURE — 99233 SBSQ HOSP IP/OBS HIGH 50: CPT

## 2024-04-08 RX ORDER — ACETAMINOPHEN 500 MG
650 TABLET ORAL EVERY 6 HOURS
Refills: 0 | Status: DISCONTINUED | OUTPATIENT
Start: 2024-04-08 | End: 2024-04-10

## 2024-04-08 RX ORDER — CIPROFLOXACIN LACTATE 400MG/40ML
400 VIAL (ML) INTRAVENOUS EVERY 12 HOURS
Refills: 0 | Status: DISCONTINUED | OUTPATIENT
Start: 2024-04-08 | End: 2024-04-10

## 2024-04-08 RX ORDER — PANTOPRAZOLE SODIUM 20 MG/1
40 TABLET, DELAYED RELEASE ORAL EVERY 12 HOURS
Refills: 0 | Status: DISCONTINUED | OUTPATIENT
Start: 2024-04-08 | End: 2024-04-10

## 2024-04-08 RX ORDER — METRONIDAZOLE 500 MG
500 TABLET ORAL EVERY 8 HOURS
Refills: 0 | Status: DISCONTINUED | OUTPATIENT
Start: 2024-04-08 | End: 2024-04-10

## 2024-04-08 RX ORDER — ONDANSETRON 8 MG/1
4 TABLET, FILM COATED ORAL EVERY 8 HOURS
Refills: 0 | Status: DISCONTINUED | OUTPATIENT
Start: 2024-04-08 | End: 2024-04-10

## 2024-04-08 RX ORDER — METRONIDAZOLE 500 MG
TABLET ORAL
Refills: 0 | Status: DISCONTINUED | OUTPATIENT
Start: 2024-04-08 | End: 2024-04-10

## 2024-04-08 RX ORDER — METRONIDAZOLE 500 MG
500 TABLET ORAL ONCE
Refills: 0 | Status: COMPLETED | OUTPATIENT
Start: 2024-04-08 | End: 2024-04-08

## 2024-04-08 RX ADMIN — Medication 12.5 MILLIGRAM(S): at 18:09

## 2024-04-08 RX ADMIN — Medication 100 MILLIGRAM(S): at 21:10

## 2024-04-08 RX ADMIN — TIOTROPIUM BROMIDE 2 PUFF(S): 18 CAPSULE ORAL; RESPIRATORY (INHALATION) at 08:50

## 2024-04-08 RX ADMIN — AMLODIPINE BESYLATE 7.5 MILLIGRAM(S): 2.5 TABLET ORAL at 00:50

## 2024-04-08 RX ADMIN — HEPARIN SODIUM 1100 UNIT(S)/HR: 5000 INJECTION INTRAVENOUS; SUBCUTANEOUS at 16:25

## 2024-04-08 RX ADMIN — Medication 200 MILLIGRAM(S): at 18:09

## 2024-04-08 RX ADMIN — Medication 100 MILLIGRAM(S): at 17:33

## 2024-04-08 RX ADMIN — BUDESONIDE AND FORMOTEROL FUMARATE DIHYDRATE 2 PUFF(S): 160; 4.5 AEROSOL RESPIRATORY (INHALATION) at 08:47

## 2024-04-08 RX ADMIN — HEPARIN SODIUM 1100 UNIT(S)/HR: 5000 INJECTION INTRAVENOUS; SUBCUTANEOUS at 02:30

## 2024-04-08 RX ADMIN — Medication 75 MILLIGRAM(S): at 00:49

## 2024-04-08 RX ADMIN — Medication 12.5 MILLIGRAM(S): at 00:49

## 2024-04-08 RX ADMIN — ATORVASTATIN CALCIUM 40 MILLIGRAM(S): 80 TABLET, FILM COATED ORAL at 21:11

## 2024-04-08 RX ADMIN — DONEPEZIL HYDROCHLORIDE 10 MILLIGRAM(S): 10 TABLET, FILM COATED ORAL at 00:49

## 2024-04-08 RX ADMIN — PANTOPRAZOLE SODIUM 40 MILLIGRAM(S): 20 TABLET, DELAYED RELEASE ORAL at 18:10

## 2024-04-08 RX ADMIN — PANTOPRAZOLE SODIUM 40 MILLIGRAM(S): 20 TABLET, DELAYED RELEASE ORAL at 05:31

## 2024-04-08 RX ADMIN — Medication 12.5 MILLIGRAM(S): at 05:25

## 2024-04-08 RX ADMIN — BUDESONIDE AND FORMOTEROL FUMARATE DIHYDRATE 2 PUFF(S): 160; 4.5 AEROSOL RESPIRATORY (INHALATION) at 22:07

## 2024-04-08 RX ADMIN — DONEPEZIL HYDROCHLORIDE 10 MILLIGRAM(S): 10 TABLET, FILM COATED ORAL at 21:12

## 2024-04-08 RX ADMIN — AMLODIPINE BESYLATE 7.5 MILLIGRAM(S): 2.5 TABLET ORAL at 21:11

## 2024-04-08 RX ADMIN — Medication 75 MILLIGRAM(S): at 21:11

## 2024-04-08 NOTE — PROGRESS NOTE ADULT - ASSESSMENT
87y/o F w/ Mild Dementia (Baseline A&Ox2) w/ PMHx HTN, HLD, HFpEF (EF 75%, GIIDD, Pulm HTN and mod-severe Aortic Stenosis), recent Left Femur Fracture s/p Fall s/p Left Juan-arthroscopy (March 2024 - on ASA and Lovenox SubQ for DVT ppx), Anxiety and Insomnia brought in by EMS from nursing home for bloody stools for 5x days. Found to have acute DVT of LLE and started on Heparin gtt as per Vascular as patient's LENA was negative and no signs of active bleeding. Admitted to medicine to manage acute LLE DVT iso suspected GI Bleed. Currently HDS, afebrile and sating well on RA    #Bloody Stools   - possibly 2/2 hemorrhoids?  - states blood streaked stools for 5x days  - was previously on ASA and Lovenox SubQ for DVT ppx  - Vitals: Temp 97.5F, /63, HR 57, RR 18, SpO2 96% on RA  - Hgb 10.3 (previously 10.6), BUN wnl  - LENA negative for blood, denies melena  - s/p PPI IV x1 in the ED  - c/w PPI IV BID  - clear liquid diet for now  - home ASA 81mg on hold - resume as per GI  - f/u GI consult        Would benefit from a non urgent outpatient colonoscopy in 6 weeks after completion of antibiotics and resolution of diverticulitis          Start IV Ciprofloxacin and IV Metronidazole         Agree with clear liquid diet. Advance diet as tolerated. Consider IV fluids        Monitor BM for recurrence of hematochezia; in case diarrhea develops, recommend sending stool studies (GI PCR, Clostridium Difficile, and Stool for                     ova/parasites/cx); check ESR and CRP          In the setting of stable Hb, absence of active bleeding, and newly diagnosed acute left peroneal DVT, agree to continue short acting AC as benefits currently                 outweigh risks       #Acute LLE Peroneal DVT - likely provoked d/t recent surgery  #Recent Left Femur Fracture s/p Fall s/p Left Juan-arthroscopy (March 2024 - on ASA and Lovenox SubQ for DVT ppx)   - LE Venous Duplex shows DVT in left peroneal vein (pre-saxena report)  - seen by vascular - recommend AC - no surgical intervention at this time  - will c/w Heparin gtt iso suspected GI bleed  - vascular recommend to discharge on Eliquis 10mg BID for 1 week and 5mg BID for 3 months  - If patient is unable to tolerate therapeutic anticoagulation, recommend wrapping LLE including the foot with ace bandage and elevation  - f/u w/ vascular Dr. Castro in 3-4 weeks o/p    #Wall Thickening and Adjacent Mild Inflammatory Change in the Sigmoid Colon  - CT A/P w/ IV contrast showed Wall thickening and adjacent mild inflammatory change and fluid associated with sigmoid diverticulosis suspicious for diverticulitis. No definite evidence of abscess  - s/p Cipro/Flagyl IV x1 in the ED  - afebrile, no diarrhea, no abdominal pain, no n/v  - tolerating PO intake  - monitor off abx    #HFpEF - not in exacerbation  #Moderate-Severe AS  #Pulm HTN  - ECHO from 3/2024 shows EF 75% w/ GIIDD, severe AS, pulm HTN, mTR  - was previously on Lasix PO, no longer on diuretics  - c/w home Metoprolol 12.5mg BID  - keep euvolemic  - strict I&Os, daily weight    #COPD vs Asthma? - not in exacerbation  - c/w home inhalers    #HTN  #HLD  - c/w home Amlodipine 7.5mg qD  - c/w home Atorvastatin 40mg qhs    #Dementia  #Anxiety  #Insomnia  - Baseline A&Ox2 (currently A&Ox1-2 to person and place)  - c/w home donepezil 10mg qD  - c/w home Trazodone 75mg qhs  - c/w home Melatonin 10mg qhs PRN for insomnia    #DVT ppx: Heparin gtt  #GI ppx: PPI IV BID  #Diet: clear liquid diet  #Activity: IAT  #Code Status: DNR/DNI  #Dispo: from NH, acute   87y/o F w/ Mild Dementia (Baseline A&Ox2) w/ PMHx HTN, HLD, HFpEF (EF 75%, GIIDD, Pulm HTN and mod-severe Aortic Stenosis), recent Left Femur Fracture s/p Fall s/p Left Juan-arthroscopy (March 2024 - on ASA and Lovenox SubQ for DVT ppx), Anxiety and Insomnia brought in by EMS from nursing home for bloody stools for 5x days. Found to have acute DVT of LLE and started on Heparin gtt as per Vascular as patient's LENA was negative and no signs of active bleeding. Admitted to medicine to manage acute LLE DVT iso suspected GI Bleed. Currently HDS, afebrile and sating well on RA    #Bloody Stools   - possibly 2/2 hemorrhoids?  - states blood streaked stools for 5x days  - was previously on ASA and Lovenox SubQ for DVT ppx  - Vitals: Temp 97.5F, /63, HR 57, RR 18, SpO2 96% on RA  - Hgb 10.3 (previously 10.6), BUN wnl  - LENA negative for blood, denies melena  - s/p PPI IV x1 in the ED  - c/w PPI IV BID  - clear liquid diet for now  - home ASA 81mg on hold - resume as per GI  - f/u GI consult        Would benefit from a non urgent outpatient colonoscopy in 6 weeks after completion of antibiotics and resolution of diverticulitis          Start IV Ciprofloxacin and IV Metronidazole         Agree with clear liquid diet. Advance diet as tolerated. Consider IV fluids        Monitor BM for recurrence of hematochezia; in case diarrhea develops, recommend sending stool studies (GI PCR, Clostridium Difficile, and Stool for                     ova/parasites/cx); check ESR and CRP          In the setting of stable Hb, absence of active bleeding, and newly diagnosed acute left peroneal DVT, agree to continue short acting AC as benefits currently                 outweigh risks       #Acute LLE Peroneal DVT - likely provoked d/t recent surgery  #Recent Left Femur Fracture s/p Fall s/p Left Juan-arthroscopy (March 2024 - on ASA and Lovenox SubQ for DVT ppx)   - LE Venous Duplex shows DVT in left peroneal vein (pre-saxena report)  - seen by vascular - recommend AC - no surgical intervention at this time  - will c/w Heparin gtt iso suspected GI bleed  - vascular recommend to discharge on Eliquis 10mg BID for 1 week and 5mg BID for 3 months  - If patient is unable to tolerate therapeutic anticoagulation, recommend wrapping LLE including the foot with ace bandage and elevation  - f/u w/ vascular Dr. Castro in 3-4 weeks o/p    #Wall Thickening and Adjacent Mild Inflammatory Change in the Sigmoid Colon  - CT A/P w/ IV contrast showed Wall thickening and adjacent mild inflammatory change and fluid associated with sigmoid diverticulosis suspicious for diverticulitis. No definite evidence of abscess  - s/p Cipro/Flagyl IV x1 in the ED  - afebrile, no diarrhea, no abdominal pain, no n/v  - tolerating PO intake  - monitor off abx    #HFpEF - not in exacerbation  #Moderate-Severe AS  #Pulm HTN  - ECHO from 3/2024 shows EF 75% w/ GIIDD, severe AS, pulm HTN, mTR  - was previously on Lasix PO, no longer on diuretics  - c/w home Metoprolol 12.5mg BID  - keep euvolemic  - strict I&Os, daily weight    #COPD vs Asthma? - not in exacerbation  - c/w home inhalers    #HTN  #HLD  - c/w home Amlodipine 7.5mg qD  - c/w home Atorvastatin 40mg qhs    #Dementia  #Anxiety  #Insomnia  - Baseline A&Ox2 (currently A&Ox1-2 to person and place)  - c/w home donepezil 10mg qD  - c/w home Trazodone 75mg qhs  - c/w home Melatonin 10mg qhs PRN for insomnia    #DVT ppx: Heparin gtt  #GI ppx: PPI IV BID  #Diet: clear liquid diet  #Activity: IAT  #Code Status: DNR/DNI  #Dispo: from NH, acute    #Progress Note Handoff  Pending (specify):  transition off heparin gtt in AM to DOAC, monitor h/h for morning, PT eval  Family discussion: updated  Disposition: return to rehab, tentatively tomorrow

## 2024-04-08 NOTE — CONSULT NOTE ADULT - ASSESSMENT
Assessment and Plan  Case of an 88 year old female patient known to have a history of Dementia, Anxiety, insomnia, recent femur fracture s/p hemiarthroplasty in 03/2024 s/p discharge on Aspirin/Prophylactic Lovenox, HTN, moderate AS, HFpEF, DL, and CCY who was brought to the ED from NH on 04/07 for few episodes of hematochezia, found to be stable with evidence of new left peroneal DVT and sigmoid diverticulitis on imaging. We are consulted for concern of hematochezia and sigmoid diverticulitis.      Acute Uncomplicated Sigmoid Diverticulitis  Hematochezia in Setting of History of Constipation: Likely Hemorrhoidal Versus Diverticular; R/O Malignancy  Stable Hemoglobin: New Baseline Post Orthopedic Procedure in 03/2024  * No prior EGD or colonoscopy per patient  * Recent left femur fracture s/p hemiarthroplasty on 03/06/2024 -> discharged on Aspirin 81mg QD and prophylactic Lovenox post procedure  * Baseline Hb 15.2 in 03/05/2024 -> had hemiarthroplasty in 03/2024 -> Hb 10.6 on 03/12 (no previous iron studies)  * No hypotension or tachycardia on arrival  * ED Labs WBC 3.89, Hb 10.3, Plt 155, INR 1.13, LFTs 0.3/73/12/14  * LENA empty vault on 04/08   * CT Abdomen and Pelvis w/ IV Cont (04.07.24 @ 16:46) wall thickening and adjacent mild inflammatory change and fluid associated with sigmoid diverticulosis suspicious for diverticulitis. No definite evidence of abscess. Follow-up is recommended to exclude neoplasm.    RECOMMENDATIONS  - Would benefit from a non urgent outpatient colonoscopy in 6 weeks after completion of antibiotics and resolution of diverticulitis (follow up with our [facility] located at [address] [place] [address]. Phone Number: [phone])  - Start IV Ciprofloxacin and IV Metronidazole   - Agree with clear liquid diet. Advance diet as tolerated. Consider IV fluids  - Antiemetics: IV Zofran 4mg Q8h PRN if QTc normal  - Pain control: keep score at 01/10  - Monitor BM for recurrence of hematochezia; in case diarrhea develops, recommend sending stool studies (GI PCR, Clostridium Difficile, and Stool for ova/parasites/cx); check ESR and CRP    - In the setting of stable Hb, absence of active bleeding, and newly diagnosed acute left peroneal DVT, agree to continue short acting AC as benefits currently outweigh risks   - Trend CBC and keep Hb > 7      Thank you for your consult.  - Please note that plan was communicated with medical team.   - Please reach GI on [phone] during weekdays till 5pm.  - Please call the GI service line after 5pm on Weekdays and anytime on Weekends: [phone].      Angeli Sutton MD  PGY - 4 Gastroenterology Fellow   [facility]

## 2024-04-08 NOTE — CONSULT NOTE ADULT - SUBJECTIVE AND OBJECTIVE BOX
Gastroenterology Initial Consult Note      Location: [facility] [place] ED Hold 052 A ([facility] [place] ED Hold)  Patient Name: CAT JACOB  Age: 88y  Gender: Female      Chief Complaint  Patient is a 88y old Female who presents with a chief complaint of GI Bleed, Acute LLE DVT (07 Apr 2024 20:42)  Primary diagnosis of Acute embolism and thrombosis of deep vein of lower extremity        Reason for Consult  Hematochezia  Diverticulitis      History of Present Illness  88 year old female patient known to have a history of Dementia, Anxiety, insomnia, recent femur fracture s/p hemiarthroplasty in 03/2024 s/p discharge on Aspirin/Prophylactic Lovenox, HTN, moderate AS, HFpEF, DL, and CCY who was brought to the ED from NH on 04/07 for few episodes of hematochezia, found to be stable with evidence of new left peroneal DVT and sigmoid diverticulitis on imaging. We are consulted for concern of hematochezia and sigmoid diverticulitis.      Summary:  * No prior EGD or colonoscopy per patient  * Recent left femur fracture s/p hemiarthroplasty on 03/06/2024 -> discharged on Aspirin 81mg QD and prophylactic Lovenox post procedure  * Baseline Hb 15.2 in 03/05/2024 -> had hemiarthroplasty in 03/2024 -> Hb 10.6 on 03/12 (no previous iron studies)    * Presentation: had constipation post hemiarthroplasty -> then since last Wednesday, patient has been having intermittent blood on wiping once-twice daily; no abdominal pain, nausea, vomiting; no weight loss  * No hypotension or tachycardia on arrival  * ED Labs WBC 3.89, Hb 10.3, Plt 155, INR 1.13, LFTs 0.3/73/12/14  * LENA empty vault on 04/08   * CT Abdomen and Pelvis w/ IV Cont (04.07.24 @ 16:46) wall thickening and adjacent mild inflammatory change and fluid associated with sigmoid diverticulosis suspicious for diverticulitis. No definite evidence of abscess. Follow-up is recommended to exclude neoplasm.      Prior EGD:  no prior      Prior Colonoscopy:  no prior      Past Medical and Surgical History:  HTN (hypertension)    Hypercholesteremia    Heavy cigarette smoker    History of laparoscopic cholecystectomy    H/O: hysterectomy    History of surgery  LEFT EYE CATARACT EXTRACTION WITH LENS  IMPLANT      Home Medications:  Home Medications:  albuterol 90 mcg/inh inhalation aerosol: 2 puff(s) inhaled every 6 hours As needed Shortness of Breath and/or Wheezing (11 Mar 2024 11:23)  amLODIPine 5 mg oral tablet: 1.5 tab(s) orally once a day (at bedtime) (07 Apr 2024 23:20)  aspirin 81 mg oral tablet, chewable: 1 tab(s) orally once a day (11 Mar 2024 11:26)  budesonide-formoterol 160 mcg-4.5 mcg/inh inhalation aerosol: 2 puff(s) inhaled 2 times a day (11 Mar 2024 11:23)  donepezil 10 mg oral tablet: 1 tab(s) orally once a day (06 Mar 2024 06:57)  ergocalciferol 50 mcg (2000 intl units) oral capsule: 1 cap(s) orally once a day (07 Apr 2024 23:26)  Lipitor 40 mg oral tablet: 1 tab(s) orally once a day (27 May 2022 08:49)  melatonin 10 mg oral capsule: 1 cap(s) orally once a day (at bedtime) as needed for  insomnia (07 Apr 2024 23:25)  metoprolol: 12.5 milligram(s) orally 2 times a day (11 Mar 2024 11:26)  polyethylene glycol 3350 oral powder for reconstitution: 17 gram(s) orally 2 times a day (11 Mar 2024 11:23)  senna leaf extract oral tablet: 2 tab(s) orally once a day (at bedtime) (11 Mar 2024 11:23)  traZODone 150 mg oral tablet: 0.5 orally once a day (at bedtime) (07 Apr 2024 23:23)      Social History:  Tobacco: ex smoker  Alcohol: denies  Drugs: denies      Allergies:  No Known Allergies      Family History:  FAMILY HISTORY:  no GI malignancies      Vital Signs in the last 24 hours   Vitals Summary T(C): 36.4 (04-08-24 @ 07:25), Max: 36.7 (04-07-24 @ 17:07)  HR: 56 (04-08-24 @ 07:25) (56 - 63)  BP: 160/70 (04-08-24 @ 07:25) (155/72 - 172/75)  RR: 19 (04-08-24 @ 07:25) (18 - 19)  SpO2: 95% (04-08-24 @ 07:25) (95% - 97%)  Vent Data   Intake/ Output   Measurements   Weight (kg): 65.771 (04-07-24 @ 19:17)      Physical Exam  * General Appearance: Alert, cooperative, interactive, oriented to time, place, and person, in no acute distress  * Neck: Supple, symmetrical, trachea midline, no adenopathy   * Lungs: Good bilateral air entry, normal breath sounds   * Heart: Regular Rate and Rhythm, normal S1 and S2, no audible murmur, rub, or gallop  * Abdomen: Symmetric, non-distended, soft, non-tender, bowel sounds active all four quadrants, no masses  * Rectal: empty vault      Investigations   Laboratory Workup      - CBC:                        10.8   5.75  )-----------( 181      ( 08 Apr 2024 07:47 )             34.0       - Hgb Trend:  10.8  04-08-24 @ 07:47  10.5  04-08-24 @ 02:10  10.3  04-07-24 @ 14:14        - Chemistry:  04-08    141  |  106  |  10  ----------------------------<  90  3.7   |  23  |  0.6<L>    Ca    8.5      08 Apr 2024 07:47  Mg     1.8     04-08    TPro  5.6<L>  /  Alb  3.3<L>  /  TBili  0.3  /  DBili  x   /  AST  12  /  ALT  14  /  AlkPhos  73  04-07    Liver panel trend:  TBili 0.3   /   AST 12   /   ALT 14   /   AlkP 73   /   Tptn 5.6   /   Alb 3.3    /   DBili --      04-07      - Coagulation Studies:  PT/INR - ( 07 Apr 2024 14:14 )   PT: 12.90 sec;   INR: 1.13 ratio         PTT - ( 08 Apr 2024 09:50 )  PTT:79.1 sec    - ABG:      - Cardiac Markers:        Microbiological Workup  Urinalysis Basic - ( 08 Apr 2024 07:47 )    Color: x / Appearance: x / SG: x / pH: x  Gluc: 90 mg/dL / Ketone: x  / Bili: x / Urobili: x   Blood: x / Protein: x / Nitrite: x   Leuk Esterase: x / RBC: x / WBC x   Sq Epi: x / Non Sq Epi: x / Bacteria: x          Radiological Workup  CT Abdomen and Pelvis w/ IV Cont:   ACC: [id number] EXAM:  CT ABDOMEN AND PELVIS IC   ORDERED BY: KYLE LEAL     PROCEDURE DATE:  04/07/2024          INTERPRETATION:  CLINICAL STATEMENT: Abdominal pain.    TECHNIQUE: Contiguous axial CT images were obtained of the abdomen and   pelvis following administration of intravenous contrast.  Oral contrast   was not administered. Reformatted images in the coronal and sagittal   planes were acquired.    COMPARISON CT: None    FINDINGS:    LOWER CHEST: Small pleural effusions with adjacent atelectasis, Partially   imaged.    HEPATOBILIARY: Prior cholecystectomy. Mild biliary ductal dilatation   which can be seen after cholecystectomy.    SPLEEN: Within normal limits.    ADRENALS: Mild nonspecific nodularity/thickening of the adrenal glands.    PANCREAS: Within normal limits.    KIDNEYS: Symmetric renal enhancement. No hydronephrosis.    ABDOMINOPELVIC NODES: No enlarged abdominal or pelvic lymph nodes.    PELVIC ORGANS: Limited evaluation due to left hip streak artifact. The   uterus is not definitely identified; correlate with surgical history    PERITONEUM/MESENTERY/BOWEL: No bowel obstruction, or free intraperitoneal   air. Wall thickening and adjacent inflammatory change and fluid   associated with sigmoid diverticulosis. No definite evidence of abscess.   Additional areas of Colonic diverticulosis. Normal appendix    BONES/SOFT TISSUES: Degenerative changes of the spine. Diffuse   atherosclerotic calcifications of the aorta and its major branches. left   hip arthroplasty. Anterior abdominal wall subcutaneous foci of air;   correlate with injection history      IMPRESSION:  Wall thickening and adjacent mild inflammatory change and fluid   associated with sigmoid diverticulosis suspicious for diverticulitis. No   definite evidence of abscess. Follow-up is recommended to exclude   neoplasm.    --- End of Report ---          Current Medications  Standing Medications  amLODIPine   Tablet 7.5 milliGRAM(s) Oral at bedtime  atorvastatin 40 milliGRAM(s) Oral at bedtime  budesonide 160 MICROgram(s)/formoterol 4.5 MICROgram(s) Inhaler 2 Puff(s) Inhalation two times a day  diphenhydrAMINE Injectable 25 milliGRAM(s) IV Push once  donepezil 10 milliGRAM(s) Oral at bedtime  heparin  Infusion.  Unit(s)/Hr (12 mL/Hr) IV Continuous <Continuous>  metoprolol tartrate 12.5 milliGRAM(s) Oral every 12 hours  pantoprazole  Injectable 40 milliGRAM(s) IV Push every 12 hours  tiotropium 2.5 MICROgram(s) Inhaler 2 Puff(s) Inhalation daily  traZODone 75 milliGRAM(s) Oral at bedtime    PRN Medications  acetaminophen     Tablet .. 650 milliGRAM(s) Oral every 6 hours PRN Temp greater or equal to 38C (100.4F), Mild Pain (1 - 3)  albuterol    90 MICROgram(s) HFA Inhaler 2 Puff(s) Inhalation every 6 hours PRN Shortness of Breath and/or Wheezing  aluminum hydroxide/magnesium hydroxide/simethicone Suspension 30 milliLiter(s) Oral every 4 hours PRN Dyspepsia  melatonin 10 milliGRAM(s) Oral at bedtime PRN Insomnia  ondansetron Injectable 4 milliGRAM(s) IV Push every 8 hours PRN Nausea and/or Vomiting    Singles Doses Administered  (ADM OVERRIDE) 1 each &lt;see task&gt; GiveOnce  (ADM OVERRIDE) 1 each &lt;see task&gt; GiveOnce  (ADM OVERRIDE) 1 each &lt;see task&gt; GiveOnce  (ADM OVERRIDE) 1 each &lt;see task&gt; GiveOnce  (ADM OVERRIDE) 1 each &lt;see task&gt; GiveOnce  (ADM OVERRIDE) 1 each &lt;see task&gt; GiveOnce  (ADM OVERRIDE) 2 each &lt;see task&gt; GiveOnce  (ADM OVERRIDE) 1 each &lt;see task&gt; GiveOnce  (ADM OVERRIDE) 1 each &lt;see task&gt; GiveOnce  (ADM OVERRIDE) 1 each &lt;see task&gt; GiveOnce  (ADM OVERRIDE) 1 each &lt;see task&gt; GiveOnce  (ADM OVERRIDE) 2 each &lt;see task&gt; GiveOnce  (ADM OVERRIDE) 2 each &lt;see task&gt; GiveOnce  ciprofloxacin   IVPB 400 milliGRAM(s) IV Intermittent once  heparin   Injectable 5500 Unit(s) IV Push once  metroNIDAZOLE  IVPB 500 milliGRAM(s) IV Intermittent once  oxycodone    5 mG/acetaminophen 325 mG 1 Tablet(s) Oral Once  pantoprazole  Injectable 40 milliGRAM(s) IV Push Once        Assessment and Plan

## 2024-04-08 NOTE — PROGRESS NOTE ADULT - SUBJECTIVE AND OBJECTIVE BOX
CAT JACOB  88y  Female      Patient is a 88y old  Female who presents with a chief complaint of GI Bleed, Acute LLE DVT (08 Apr 2024 15:16)      INTERVAL HPI/OVERNIGHT EVENTS:      REVIEW OF SYSTEMS:  CONSTITUTIONAL: No fever, weight loss, or fatigue  RESPIRATORY: No cough, wheezing, chills or hemoptysis; No shortness of breath  CARDIOVASCULAR: No chest pain, palpitations, dizziness, or leg swelling  GASTROINTESTINAL: No abdominal or epigastric pain. No nausea, vomiting, or hematemesis; No diarrhea or constipation. No melena or hematochezia.  GENITOURINARY: No dysuria, frequency, hematuria, or incontinence  NEUROLOGICAL: No headaches, memory loss, loss of strength, numbness, or tremors  SKIN: No itching, burning, rashes, or lesions   MUSCULOSKELETAL: No joint pain or swelling; No muscle, back, or extremity pain  PSYCHIATRIC: No depression, anxiety, mood swings, or difficulty sleeping  All other review of systems negative    T(C): 36.4 (04-08-24 @ 07:25), Max: 36.7 (04-07-24 @ 17:07)  HR: 56 (04-08-24 @ 07:25) (56 - 63)  BP: 160/70 (04-08-24 @ 07:25) (155/72 - 172/75)  RR: 19 (04-08-24 @ 07:25) (18 - 19)  SpO2: 95% (04-08-24 @ 07:25) (95% - 97%)  Wt(kg): --Vital Signs Last 24 Hrs  T(C): 36.4 (08 Apr 2024 07:25), Max: 36.7 (07 Apr 2024 17:07)  T(F): 97.5 (08 Apr 2024 07:25), Max: 98 (07 Apr 2024 17:07)  HR: 56 (08 Apr 2024 07:25) (56 - 63)  BP: 160/70 (08 Apr 2024 07:25) (155/72 - 172/75)  BP(mean): 100 (08 Apr 2024 07:25) (100 - 100)  RR: 19 (08 Apr 2024 07:25) (18 - 19)  SpO2: 95% (08 Apr 2024 07:25) (95% - 97%)    Parameters below as of 08 Apr 2024 07:25  Patient On (Oxygen Delivery Method): room air            PHYSICAL EXAM:  GENERAL: NAD, well-groomed, well-developed  PSYCH: no agitation, baseline mentation  NERVOUS SYSTEM:  Alert & Oriented X3, Motor Strength 5/5 B/L upper and lower extremities; Sensory intact; FTN WNL  PULMONARY: Clear to percussion bilaterally; No rales, rhonchi, wheezing, or rubs  CARDIOVASCULAR: Regular rate and rhythm; No murmurs, rubs, or gallops  GI: Soft, Nontender, Nondistended; Bowel sounds present  EXTREMITIES:  2+ Peripheral Pulses, No clubbing, cyanosis, or edema  LYMPH: No lymphadenopathy noted  SKIN: No rashes or lesions    Consultant(s) Notes Reviewed:  [x ] YES  [ ] NO    Discussed with Consultants/Other Providers [ x] YES     LABS                          10.8   5.75  )-----------( 181      ( 08 Apr 2024 07:47 )             34.0     04-08    141  |  106  |  10  ----------------------------<  90  3.7   |  23  |  0.6<L>    Ca    8.5      08 Apr 2024 07:47  Mg     1.8     04-08    TPro  5.6<L>  /  Alb  3.3<L>  /  TBili  0.3  /  DBili  x   /  AST  12  /  ALT  14  /  AlkPhos  73  04-07      Urinalysis Basic - ( 08 Apr 2024 07:47 )    Color: x / Appearance: x / SG: x / pH: x  Gluc: 90 mg/dL / Ketone: x  / Bili: x / Urobili: x   Blood: x / Protein: x / Nitrite: x   Leuk Esterase: x / RBC: x / WBC x   Sq Epi: x / Non Sq Epi: x / Bacteria: x      PT/INR - ( 07 Apr 2024 14:14 )   PT: 12.90 sec;   INR: 1.13 ratio    PTT - ( 08 Apr 2024 09:50 )  PTT:79.1 sec    POCT Blood Glucose.: 110 mg/dL (08 Apr 2024 11:49)    RADIOLOGY & ADDITIONAL TESTS:  < from: CT Abdomen and Pelvis w/ IV Cont (04.07.24 @ 16:46) >  IMPRESSION:  Wall thickening and adjacent mild inflammatory change and fluid   associated with sigmoid diverticulosis suspicious for diverticulitis. No   definite evidence of abscess. Follow-up is recommended to exclude   neoplasm.    < end of copied text >  < from: [facility] Duplex Lower Ext Vein Scan, Bilat (04.07.24 @ 16:23) >    IMPRESSION:  Right lower extremity negative for DVT.    DVT in left peroneal vein.    Imaging Personally Reviewed:  [ ] YES  [x] NO    HEALTH ISSUES - PROBLEM Dx:      MEDICATIONS  (STANDING):  amLODIPine   Tablet 7.5 milliGRAM(s) Oral at bedtime  atorvastatin 40 milliGRAM(s) Oral at bedtime  budesonide 160 MICROgram(s)/formoterol 4.5 MICROgram(s) Inhaler 2 Puff(s) Inhalation two times a day  diphenhydrAMINE Injectable 25 milliGRAM(s) IV Push once  donepezil 10 milliGRAM(s) Oral at bedtime  heparin  Infusion.  Unit(s)/Hr (12 mL/Hr) IV Continuous <Continuous>  metoprolol tartrate 12.5 milliGRAM(s) Oral every 12 hours  pantoprazole  Injectable 40 milliGRAM(s) IV Push every 12 hours  tiotropium 2.5 MICROgram(s) Inhaler 2 Puff(s) Inhalation daily  traZODone 75 milliGRAM(s) Oral at bedtime    MEDICATIONS  (PRN):  acetaminophen     Tablet .. 650 milliGRAM(s) Oral every 6 hours PRN Temp greater or equal to 38C (100.4F), Mild Pain (1 - 3)  albuterol    90 MICROgram(s) HFA Inhaler 2 Puff(s) Inhalation every 6 hours PRN Shortness of Breath and/or Wheezing  aluminum hydroxide/magnesium hydroxide/simethicone Suspension 30 milliLiter(s) Oral every 4 hours PRN Dyspepsia  melatonin 10 milliGRAM(s) Oral at bedtime PRN Insomnia  ondansetron Injectable 4 milliGRAM(s) IV Push every 8 hours PRN Nausea and/or Vomiting         NIDIAJESSECAT  88y  Female      Patient is a 88y old  Female who presents with a chief complaint of GI Bleed, Acute LLE DVT (08 Apr 2024 15:16)      INTERVAL HPI/OVERNIGHT EVENTS: no acute events overnight. daughter at bedside. no recurrence of hematochezia or melena. no abdominal pain or fevers or chills.       REVIEW OF SYSTEMS:  CONSTITUTIONAL: No fever, weight loss, or fatigue  RESPIRATORY: No cough, wheezing, chills or hemoptysis; No shortness of breath  CARDIOVASCULAR: No chest pain, palpitations, dizziness, or leg swelling  GASTROINTESTINAL: No abdominal or epigastric pain. No nausea, vomiting, or hematemesis; No diarrhea or constipation. No melena or hematochezia.  GENITOURINARY: No dysuria, frequency, hematuria, or incontinence  NEUROLOGICAL: No headaches, memory loss, loss of strength, numbness, or tremors  SKIN: No itching, burning, rashes, or lesions   MUSCULOSKELETAL: No joint pain or swelling; No muscle, back, or extremity pain  PSYCHIATRIC: No depression, anxiety, mood swings, or difficulty sleeping  All other review of systems negative    T(C): 36.4 (04-08-24 @ 07:25), Max: 36.7 (04-07-24 @ 17:07)  HR: 56 (04-08-24 @ 07:25) (56 - 63)  BP: 160/70 (04-08-24 @ 07:25) (155/72 - 172/75)  RR: 19 (04-08-24 @ 07:25) (18 - 19)  SpO2: 95% (04-08-24 @ 07:25) (95% - 97%)  Wt(kg): --Vital Signs Last 24 Hrs  T(C): 36.4 (08 Apr 2024 07:25), Max: 36.7 (07 Apr 2024 17:07)  T(F): 97.5 (08 Apr 2024 07:25), Max: 98 (07 Apr 2024 17:07)  HR: 56 (08 Apr 2024 07:25) (56 - 63)  BP: 160/70 (08 Apr 2024 07:25) (155/72 - 172/75)  BP(mean): 100 (08 Apr 2024 07:25) (100 - 100)  RR: 19 (08 Apr 2024 07:25) (18 - 19)  SpO2: 95% (08 Apr 2024 07:25) (95% - 97%)    Parameters below as of 08 Apr 2024 07:25  Patient On (Oxygen Delivery Method): room air            PHYSICAL EXAM:  GENERAL: elderly, frail appearing F  PSYCH: no agitation, baseline mentation  NERVOUS SYSTEM:  Alert & Oriented X3   PULMONARY: symmetrical chest rise, no accessory muscle use  CARDIOVASCULAR: non tachycardic  GI:  Nondistended   EXTREMITIES:  No clubbing, cyanosis, or edema  SKIN: No rashes or lesions    Consultant(s) Notes Reviewed:  [x ] YES  [ ] NO    Discussed with Consultants/Other Providers [ x] YES     LABS                          10.8   5.75  )-----------( 181      ( 08 Apr 2024 07:47 )             34.0     04-08    141  |  106  |  10  ----------------------------<  90  3.7   |  23  |  0.6<L>    Ca    8.5      08 Apr 2024 07:47  Mg     1.8     04-08    TPro  5.6<L>  /  Alb  3.3<L>  /  TBili  0.3  /  DBili  x   /  AST  12  /  ALT  14  /  AlkPhos  73  04-07      Urinalysis Basic - ( 08 Apr 2024 07:47 )    Color: x / Appearance: x / SG: x / pH: x  Gluc: 90 mg/dL / Ketone: x  / Bili: x / Urobili: x   Blood: x / Protein: x / Nitrite: x   Leuk Esterase: x / RBC: x / WBC x   Sq Epi: x / Non Sq Epi: x / Bacteria: x      PT/INR - ( 07 Apr 2024 14:14 )   PT: 12.90 sec;   INR: 1.13 ratio    PTT - ( 08 Apr 2024 09:50 )  PTT:79.1 sec    POCT Blood Glucose.: 110 mg/dL (08 Apr 2024 11:49)    RADIOLOGY & ADDITIONAL TESTS:  < from: CT Abdomen and Pelvis w/ IV Cont (04.07.24 @ 16:46) >  IMPRESSION:  Wall thickening and adjacent mild inflammatory change and fluid   associated with sigmoid diverticulosis suspicious for diverticulitis. No   definite evidence of abscess. Follow-up is recommended to exclude   neoplasm.    < end of copied text >  < from: [facility] Duplex Lower Ext Vein Scan, Bilat (04.07.24 @ 16:23) >    IMPRESSION:  Right lower extremity negative for DVT.    DVT in left peroneal vein.    Imaging Personally Reviewed:  [ ] YES  [x] NO    HEALTH ISSUES - PROBLEM Dx:      MEDICATIONS  (STANDING):  amLODIPine   Tablet 7.5 milliGRAM(s) Oral at bedtime  atorvastatin 40 milliGRAM(s) Oral at bedtime  budesonide 160 MICROgram(s)/formoterol 4.5 MICROgram(s) Inhaler 2 Puff(s) Inhalation two times a day  diphenhydrAMINE Injectable 25 milliGRAM(s) IV Push once  donepezil 10 milliGRAM(s) Oral at bedtime  heparin  Infusion.  Unit(s)/Hr (12 mL/Hr) IV Continuous <Continuous>  metoprolol tartrate 12.5 milliGRAM(s) Oral every 12 hours  pantoprazole  Injectable 40 milliGRAM(s) IV Push every 12 hours  tiotropium 2.5 MICROgram(s) Inhaler 2 Puff(s) Inhalation daily  traZODone 75 milliGRAM(s) Oral at bedtime    MEDICATIONS  (PRN):  acetaminophen     Tablet .. 650 milliGRAM(s) Oral every 6 hours PRN Temp greater or equal to 38C (100.4F), Mild Pain (1 - 3)  albuterol    90 MICROgram(s) HFA Inhaler 2 Puff(s) Inhalation every 6 hours PRN Shortness of Breath and/or Wheezing  aluminum hydroxide/magnesium hydroxide/simethicone Suspension 30 milliLiter(s) Oral every 4 hours PRN Dyspepsia  melatonin 10 milliGRAM(s) Oral at bedtime PRN Insomnia  ondansetron Injectable 4 milliGRAM(s) IV Push every 8 hours PRN Nausea and/or Vomiting

## 2024-04-09 ENCOUNTER — TRANSCRIPTION ENCOUNTER (OUTPATIENT)
Age: 89
End: 2024-04-09

## 2024-04-09 LAB
ALBUMIN SERPL ELPH-MCNC: 3.2 G/DL — LOW (ref 3.5–5.2)
ALP SERPL-CCNC: 72 U/L — SIGNIFICANT CHANGE UP (ref 30–115)
ALT FLD-CCNC: 13 U/L — SIGNIFICANT CHANGE UP (ref 0–41)
ANION GAP SERPL CALC-SCNC: 13 MMOL/L — SIGNIFICANT CHANGE UP (ref 7–14)
AST SERPL-CCNC: 13 U/L — SIGNIFICANT CHANGE UP (ref 0–41)
BASOPHILS # BLD AUTO: 0.05 K/UL — SIGNIFICANT CHANGE UP (ref 0–0.2)
BASOPHILS NFR BLD AUTO: 0.8 % — SIGNIFICANT CHANGE UP (ref 0–1)
BILIRUB SERPL-MCNC: 0.4 MG/DL — SIGNIFICANT CHANGE UP (ref 0.2–1.2)
BUN SERPL-MCNC: 6 MG/DL — LOW (ref 10–20)
CALCIUM SERPL-MCNC: 8.3 MG/DL — LOW (ref 8.4–10.5)
CHLORIDE SERPL-SCNC: 103 MMOL/L — SIGNIFICANT CHANGE UP (ref 98–110)
CO2 SERPL-SCNC: 24 MMOL/L — SIGNIFICANT CHANGE UP (ref 17–32)
CREAT SERPL-MCNC: 0.6 MG/DL — LOW (ref 0.7–1.5)
CULTURE RESULTS: SIGNIFICANT CHANGE UP
EGFR: 87 ML/MIN/1.73M2 — SIGNIFICANT CHANGE UP
EOSINOPHIL # BLD AUTO: 0.03 K/UL — SIGNIFICANT CHANGE UP (ref 0–0.7)
EOSINOPHIL NFR BLD AUTO: 0.5 % — SIGNIFICANT CHANGE UP (ref 0–8)
GLUCOSE SERPL-MCNC: 103 MG/DL — HIGH (ref 70–99)
HCT VFR BLD CALC: 32.7 % — LOW (ref 37–47)
HGB BLD-MCNC: 10.8 G/DL — LOW (ref 12–16)
IMM GRANULOCYTES NFR BLD AUTO: 0.5 % — HIGH (ref 0.1–0.3)
LYMPHOCYTES # BLD AUTO: 1.89 K/UL — SIGNIFICANT CHANGE UP (ref 1.2–3.4)
LYMPHOCYTES # BLD AUTO: 30.7 % — SIGNIFICANT CHANGE UP (ref 20.5–51.1)
MAGNESIUM SERPL-MCNC: 1.6 MG/DL — LOW (ref 1.8–2.4)
MCHC RBC-ENTMCNC: 28.3 PG — SIGNIFICANT CHANGE UP (ref 27–31)
MCHC RBC-ENTMCNC: 33 G/DL — SIGNIFICANT CHANGE UP (ref 32–37)
MCV RBC AUTO: 85.6 FL — SIGNIFICANT CHANGE UP (ref 81–99)
MONOCYTES # BLD AUTO: 0.75 K/UL — HIGH (ref 0.1–0.6)
MONOCYTES NFR BLD AUTO: 12.2 % — HIGH (ref 1.7–9.3)
NEUTROPHILS # BLD AUTO: 3.4 K/UL — SIGNIFICANT CHANGE UP (ref 1.4–6.5)
NEUTROPHILS NFR BLD AUTO: 55.3 % — SIGNIFICANT CHANGE UP (ref 42.2–75.2)
NRBC # BLD: 0 /100 WBCS — SIGNIFICANT CHANGE UP (ref 0–0)
PLATELET # BLD AUTO: 189 K/UL — SIGNIFICANT CHANGE UP (ref 130–400)
PMV BLD: 9.5 FL — SIGNIFICANT CHANGE UP (ref 7.4–10.4)
POTASSIUM SERPL-MCNC: 3.5 MMOL/L — SIGNIFICANT CHANGE UP (ref 3.5–5)
POTASSIUM SERPL-SCNC: 3.5 MMOL/L — SIGNIFICANT CHANGE UP (ref 3.5–5)
PROT SERPL-MCNC: 5.4 G/DL — LOW (ref 6–8)
RBC # BLD: 3.82 M/UL — LOW (ref 4.2–5.4)
RBC # FLD: 15.6 % — HIGH (ref 11.5–14.5)
SODIUM SERPL-SCNC: 140 MMOL/L — SIGNIFICANT CHANGE UP (ref 135–146)
SPECIMEN SOURCE: SIGNIFICANT CHANGE UP
WBC # BLD: 6.15 K/UL — SIGNIFICANT CHANGE UP (ref 4.8–10.8)
WBC # FLD AUTO: 6.15 K/UL — SIGNIFICANT CHANGE UP (ref 4.8–10.8)

## 2024-04-09 PROCEDURE — 99239 HOSP IP/OBS DSCHRG MGMT >30: CPT

## 2024-04-09 PROCEDURE — 99233 SBSQ HOSP IP/OBS HIGH 50: CPT

## 2024-04-09 PROCEDURE — 99231 SBSQ HOSP IP/OBS SF/LOW 25: CPT

## 2024-04-09 RX ORDER — ERGOCALCIFEROL 1.25 MG/1
1 CAPSULE ORAL
Refills: 0 | DISCHARGE

## 2024-04-09 RX ORDER — HEPARIN SODIUM 5000 [USP'U]/ML
1100 INJECTION INTRAVENOUS; SUBCUTANEOUS
Qty: 25000 | Refills: 0 | Status: DISCONTINUED | OUTPATIENT
Start: 2024-04-09 | End: 2024-04-09

## 2024-04-09 RX ORDER — CIPROFLOXACIN LACTATE 400MG/40ML
1 VIAL (ML) INTRAVENOUS
Qty: 12 | Refills: 0
Start: 2024-04-09 | End: 2024-04-14

## 2024-04-09 RX ORDER — APIXABAN 2.5 MG/1
2 TABLET, FILM COATED ORAL
Qty: 120 | Refills: 0
Start: 2024-04-09 | End: 2024-05-08

## 2024-04-09 RX ORDER — METRONIDAZOLE 500 MG
1 TABLET ORAL
Qty: 18 | Refills: 0
Start: 2024-04-09 | End: 2024-04-14

## 2024-04-09 RX ORDER — APIXABAN 2.5 MG/1
10 TABLET, FILM COATED ORAL EVERY 12 HOURS
Refills: 0 | Status: DISCONTINUED | OUTPATIENT
Start: 2024-04-09 | End: 2024-04-10

## 2024-04-09 RX ADMIN — HEPARIN SODIUM 1100 UNIT(S)/HR: 5000 INJECTION INTRAVENOUS; SUBCUTANEOUS at 00:02

## 2024-04-09 RX ADMIN — Medication 75 MILLIGRAM(S): at 21:53

## 2024-04-09 RX ADMIN — ATORVASTATIN CALCIUM 40 MILLIGRAM(S): 80 TABLET, FILM COATED ORAL at 21:53

## 2024-04-09 RX ADMIN — Medication 12.5 MILLIGRAM(S): at 17:29

## 2024-04-09 RX ADMIN — Medication 200 MILLIGRAM(S): at 17:30

## 2024-04-09 RX ADMIN — Medication 100 MILLIGRAM(S): at 05:13

## 2024-04-09 RX ADMIN — Medication 100 MILLIGRAM(S): at 15:30

## 2024-04-09 RX ADMIN — BUDESONIDE AND FORMOTEROL FUMARATE DIHYDRATE 2 PUFF(S): 160; 4.5 AEROSOL RESPIRATORY (INHALATION) at 22:00

## 2024-04-09 RX ADMIN — Medication 12.5 MILLIGRAM(S): at 05:14

## 2024-04-09 RX ADMIN — PANTOPRAZOLE SODIUM 40 MILLIGRAM(S): 20 TABLET, DELAYED RELEASE ORAL at 05:15

## 2024-04-09 RX ADMIN — Medication 650 MILLIGRAM(S): at 23:23

## 2024-04-09 RX ADMIN — APIXABAN 10 MILLIGRAM(S): 2.5 TABLET, FILM COATED ORAL at 17:29

## 2024-04-09 RX ADMIN — Medication 200 MILLIGRAM(S): at 05:15

## 2024-04-09 RX ADMIN — Medication 650 MILLIGRAM(S): at 22:58

## 2024-04-09 RX ADMIN — AMLODIPINE BESYLATE 7.5 MILLIGRAM(S): 2.5 TABLET ORAL at 21:53

## 2024-04-09 RX ADMIN — TIOTROPIUM BROMIDE 2 PUFF(S): 18 CAPSULE ORAL; RESPIRATORY (INHALATION) at 08:28

## 2024-04-09 RX ADMIN — Medication 100 MILLIGRAM(S): at 21:53

## 2024-04-09 RX ADMIN — BUDESONIDE AND FORMOTEROL FUMARATE DIHYDRATE 2 PUFF(S): 160; 4.5 AEROSOL RESPIRATORY (INHALATION) at 08:25

## 2024-04-09 RX ADMIN — DONEPEZIL HYDROCHLORIDE 10 MILLIGRAM(S): 10 TABLET, FILM COATED ORAL at 21:52

## 2024-04-09 RX ADMIN — PANTOPRAZOLE SODIUM 40 MILLIGRAM(S): 20 TABLET, DELAYED RELEASE ORAL at 17:30

## 2024-04-09 NOTE — DISCHARGE NOTE PROVIDER - NSDCMRMEDTOKEN_GEN_ALL_CORE_FT
albuterol 90 mcg/inh inhalation aerosol: 2 puff(s) inhaled every 6 hours As needed Shortness of Breath and/or Wheezing  amLODIPine 5 mg oral tablet: 1.5 tab(s) orally once a day (at bedtime)  aspirin 81 mg oral tablet, chewable: 1 tab(s) orally once a day  budesonide-formoterol 160 mcg-4.5 mcg/inh inhalation aerosol: 2 puff(s) inhaled 2 times a day  donepezil 10 mg oral tablet: 1 tab(s) orally once a day  ergocalciferol 50 mcg (2000 intl units) oral capsule: 1 cap(s) orally once a day  Lipitor 40 mg oral tablet: 1 tab(s) orally once a day  melatonin 10 mg oral capsule: 1 cap(s) orally once a day (at bedtime) as needed for  insomnia  metoprolol: 12.5 milligram(s) orally 2 times a day  polyethylene glycol 3350 oral powder for reconstitution: 17 gram(s) orally 2 times a day  senna leaf extract oral tablet: 2 tab(s) orally once a day (at bedtime)  tiotropium 2.5 mcg/inh inhalation aerosol: 2 puff(s) inhaled once a day  traZODone 150 mg oral tablet: 0.5 orally once a day (at bedtime)   albuterol 90 mcg/inh inhalation aerosol: 2 puff(s) inhaled every 6 hours As needed Shortness of Breath and/or Wheezing  amLODIPine 5 mg oral tablet: 1.5 tab(s) orally once a day (at bedtime)  apixaban 5 mg oral tablet: 2 tab(s) orally every 12 hours Take 2 tablets (10mg) twice a day for 7 days, then take 1 tablet (5mg) twice a day for 3 months  budesonide-formoterol 160 mcg-4.5 mcg/inh inhalation aerosol: 2 puff(s) inhaled 2 times a day  donepezil 10 mg oral tablet: 1 tab(s) orally once a day  Lipitor 40 mg oral tablet: 1 tab(s) orally once a day  melatonin 10 mg oral capsule: 1 cap(s) orally once a day (at bedtime) as needed for  insomnia  metoprolol: 12.5 milligram(s) orally 2 times a day  polyethylene glycol 3350 oral powder for reconstitution: 17 gram(s) orally 2 times a day  senna leaf extract oral tablet: 2 tab(s) orally once a day (at bedtime)  tiotropium 2.5 mcg/inh inhalation aerosol: 2 puff(s) inhaled once a day  traZODone 150 mg oral tablet: 0.5 orally once a day (at bedtime)   albuterol 90 mcg/inh inhalation aerosol: 2 puff(s) inhaled every 6 hours As needed Shortness of Breath and/or Wheezing  amLODIPine 5 mg oral tablet: 1.5 tab(s) orally once a day (at bedtime)  apixaban 5 mg oral tablet: 2 tab(s) orally every 12 hours Take 2 tablets (10mg) twice a day for 7 days, then take 1 tablet (5mg) twice a day for 3 months  budesonide-formoterol 160 mcg-4.5 mcg/inh inhalation aerosol: 2 puff(s) inhaled 2 times a day  ciprofloxacin 500 mg oral tablet: 1 tab(s) orally 2 times a day  donepezil 10 mg oral tablet: 1 tab(s) orally once a day  Lipitor 40 mg oral tablet: 1 tab(s) orally once a day  melatonin 10 mg oral capsule: 1 cap(s) orally once a day (at bedtime) as needed for  insomnia  metoprolol: 12.5 milligram(s) orally 2 times a day  metroNIDAZOLE 500 mg oral tablet: 1 tab(s) orally 3 times a day  polyethylene glycol 3350 oral powder for reconstitution: 17 gram(s) orally 2 times a day  senna leaf extract oral tablet: 2 tab(s) orally once a day (at bedtime)  tiotropium 2.5 mcg/inh inhalation aerosol: 2 puff(s) inhaled once a day  traZODone 150 mg oral tablet: 0.5 orally once a day (at bedtime)

## 2024-04-09 NOTE — DISCHARGE NOTE PROVIDER - NSDCFUSCHEDAPPT_GEN_ALL_CORE_FT
Julian Sebastian Physician Formerly [facility], [facility]  [address]  Scheduled Appointment: 06/06/2024

## 2024-04-09 NOTE — PATIENT PROFILE ADULT - FALL HARM RISK - HARM RISK INTERVENTIONS

## 2024-04-09 NOTE — PHYSICAL THERAPY INITIAL EVALUATION ADULT - ADDITIONAL COMMENTS
Pt reported she lives in a 2 family house where daughter lives downstairs & she lives upstairs, 4-5 steps to enter & 1 flight of stairs inside.   Pt [facility] admitted from [facility].

## 2024-04-09 NOTE — PHYSICAL THERAPY INITIAL EVALUATION ADULT - GENERAL OBSERVATIONS, REHAB EVAL
Pt seen from [id number]. Pt encountered in the bed in ED 3, +IV heparin, confusion, c/o pain in the L hip however, unable to quantify the pain level, agreeable for b/s PT IE/tx.

## 2024-04-09 NOTE — DISCHARGE NOTE PROVIDER - ATTENDING DISCHARGE PHYSICAL EXAMINATION:
Gen- elderly, frail appearing F, NAD  Eyes- anicteric sclear, non injected or pale conjunctiva, EOMI  ENT- MMM  Chest- symmetrical chest rise  Abdominal- non distended, non ttp

## 2024-04-09 NOTE — PHYSICAL THERAPY INITIAL EVALUATION ADULT - PERTINENT HX OF CURRENT PROBLEM, REHAB EVAL
88 year old female patient known to have a history of Dementia, Anxiety, insomnia, recent femur fracture s/p hemiarthroplasty in 03/2024 s/p discharge on Aspirin/Prophylactic Lovenox, HTN, moderate AS, HFpEF, DL, and CCY who was brought to the ED from NH on 04/07 for few episodes of hematochezia, found to be stable with evidence of new left peroneal DVT and sigmoid diverticulitis on imaging. We are consulted for concern of hematochezia and sigmoid diverticulitis. Acute LLE DVT  * Recent left femur fracture s/p hemiarthroplasty on 03/06/2024 -> discharged on Aspirin 81mg QD and prophylactic Lovenox post procedure  * Baseline Hb 15.2 in 03/05/2024 -> had hemiarthroplasty in 03/2024 -> Hb 10.6 on 03/12 (no previous iron studies)

## 2024-04-09 NOTE — PROGRESS NOTE ADULT - SUBJECTIVE AND OBJECTIVE BOX
Gastroenterology Follow Up Note      Location: [facility] ED Hold [id number] ([facility] ED Hold)  Patient Name: CAT JACOB  Age: 88y  Gender: Female      Chief Complaint  Patient is a 88y old Female who presents with a chief complaint of GI Bleed, Acute LLE DVT (08 Apr 2024 15:36)  Primary diagnosis of Acute embolism and thrombosis of deep vein of lower extremity      Reason for Consult  Hematochezia  Diverticulitis      Progress Note  This morning patient was seen and examined at bedside.    Today is hospital day 2d.  Patient is doing fine. No acute events overnight.   Patient's appetite is adequate, and she is tolerating diet and denies nausea or vomiting.   Patient denies any abdominal pain.  Last bowel movement was soft and was on 04/08 (no recurrent hematochezia per patient and RN).      Vital Signs in the last 24 hours   Vitals Summary T(C): 36.4 (04-09-24 @ 07:45), Max: 36.7 (04-09-24 @ 05:10)  HR: 52 (04-09-24 @ 07:45) (52 - 86)  BP: 141/63 (04-09-24 @ 07:45) (141/63 - 160/68)  RR: 18 (04-09-24 @ 07:45) (18 - 20)  SpO2: 96% (04-09-24 @ 07:45) (94% - 99%)  Vent Data   Intake/ Output   Measurements       Physical Exam  * General Appearance: Alert, cooperative, interactive, oriented to time, place, and person, in no acute distress  * Neck: Supple, symmetrical, trachea midline, no adenopathy   * Lungs: Good bilateral air entry, normal breath sounds   * Heart: Regular Rate and Rhythm, normal S1 and S2, no audible murmur, rub, or gallop  * Abdomen: Symmetric, non-distended, soft, non-tender, bowel sounds active all four quadrants, no masses  * Rectal: empty vault      Investigations   Laboratory Workup      - CBC:                        10.8   5.75  )-----------( 181      ( 08 Apr 2024 07:47 )             34.0       - Hgb Trend:  10.8  04-08-24 @ 07:47  10.5  04-08-24 @ 02:10  10.3  04-07-24 @ 14:14        - Chemistry:  04-08    141  |  106  |  10  ----------------------------<  90  3.7   |  23  |  0.6<L>    Ca    8.5      08 Apr 2024 07:47  Mg     1.8     04-08    TPro  5.6<L>  /  Alb  3.3<L>  /  TBili  0.3  /  DBili  x   /  AST  12  /  ALT  14  /  AlkPhos  73  04-07    Liver panel trend:  TBili 0.3   /   AST 12   /   ALT 14   /   AlkP 73   /   Tptn 5.6   /   Alb 3.3    /   DBili --      04-07      - Coagulation Studies:  PT/INR - ( 07 Apr 2024 14:14 )   PT: 12.90 sec;   INR: 1.13 ratio         PTT - ( 08 Apr 2024 22:04 )  PTT:83.0 sec    - ABG:      - Cardiac Markers:        Microbiological Workup  Urinalysis Basic - ( 08 Apr 2024 07:47 )    Color: x / Appearance: x / SG: x / pH: x  Gluc: 90 mg/dL / Ketone: x  / Bili: x / Urobili: x   Blood: x / Protein: x / Nitrite: x   Leuk Esterase: x / RBC: x / WBC x   Sq Epi: x / Non Sq Epi: x / Bacteria: x        Culture - Urine (collected 08 Apr 2024 01:25)  Source: Clean Catch Clean Catch (Midstream)  Final Report (09 Apr 2024 07:01):    <10,000 CFU/mL Normal Urogenital Shannon    Culture - Blood (collected 07 Apr 2024 18:48)  Source: .Blood Blood  Preliminary Report (09 Apr 2024 01:02):    No growth at 24 hours        Radiological Workup            Current Medications  Standing Medications  amLODIPine   Tablet 7.5 milliGRAM(s) Oral at bedtime  atorvastatin 40 milliGRAM(s) Oral at bedtime  budesonide 160 MICROgram(s)/formoterol 4.5 MICROgram(s) Inhaler 2 Puff(s) Inhalation two times a day  ciprofloxacin   IVPB 400 milliGRAM(s) IV Intermittent every 12 hours  diphenhydrAMINE Injectable 25 milliGRAM(s) IV Push once  donepezil 10 milliGRAM(s) Oral at bedtime  heparin  Infusion.  Unit(s)/Hr (12 mL/Hr) IV Continuous <Continuous>  metoprolol tartrate 12.5 milliGRAM(s) Oral every 12 hours  metroNIDAZOLE  IVPB 500 milliGRAM(s) IV Intermittent every 8 hours  metroNIDAZOLE  IVPB      pantoprazole  Injectable 40 milliGRAM(s) IV Push every 12 hours  tiotropium 2.5 MICROgram(s) Inhaler 2 Puff(s) Inhalation daily  traZODone 75 milliGRAM(s) Oral at bedtime    PRN Medications  acetaminophen     Tablet .. 650 milliGRAM(s) Oral every 6 hours PRN Temp greater or equal to 38C (100.4F), Mild Pain (1 - 3)  albuterol    90 MICROgram(s) HFA Inhaler 2 Puff(s) Inhalation every 6 hours PRN Shortness of Breath and/or Wheezing  aluminum hydroxide/magnesium hydroxide/simethicone Suspension 30 milliLiter(s) Oral every 4 hours PRN Dyspepsia  melatonin 10 milliGRAM(s) Oral at bedtime PRN Insomnia  ondansetron Injectable 4 milliGRAM(s) IV Push every 8 hours PRN Nausea and/or Vomiting    Singles Doses Administered  (ADM OVERRIDE) 1 each &lt;see task&gt; GiveOnce  (ADM OVERRIDE) 1 each &lt;see task&gt; GiveOnce  (ADM OVERRIDE) 1 each &lt;see task&gt; GiveOnce  (ADM OVERRIDE) 1 each &lt;see task&gt; GiveOnce  (ADM OVERRIDE) 1 each &lt;see task&gt; GiveOnce  (ADM OVERRIDE) 1 each &lt;see task&gt; GiveOnce  (ADM OVERRIDE) 2 each &lt;see task&gt; GiveOnce  (ADM OVERRIDE) 1 each &lt;see task&gt; GiveOnce  (ADM OVERRIDE) 1 each &lt;see task&gt; GiveOnce  (ADM OVERRIDE) 1 each &lt;see task&gt; GiveOnce  (ADM OVERRIDE) 1 each &lt;see task&gt; GiveOnce  (ADM OVERRIDE) 1 each &lt;see task&gt; GiveOnce  (ADM OVERRIDE) 1 each &lt;see task&gt; GiveOnce  (ADM OVERRIDE) 1 each &lt;see task&gt; GiveOnce  (ADM OVERRIDE) 2 each &lt;see task&gt; GiveOnce  (ADM OVERRIDE) 1 each &lt;see task&gt; GiveOnce  (ADM OVERRIDE) 1 each &lt;see task&gt; GiveOnce  (ADM OVERRIDE) 1 each &lt;see task&gt; GiveOnce  (ADM OVERRIDE) 1 each &lt;see task&gt; GiveOnce  (ADM OVERRIDE) 2 each &lt;see task&gt; GiveOnce  (ADM OVERRIDE) 1 each &lt;see task&gt; GiveOnce  (ADM OVERRIDE) 1 each &lt;see task&gt; GiveOnce  (ADM OVERRIDE) 2 each &lt;see task&gt; GiveOnce  (ADM OVERRIDE) 1 each &lt;see task&gt; GiveOnce  (ADM OVERRIDE) 2 each &lt;see task&gt; GiveOnce  (ADM OVERRIDE) 2 each &lt;see task&gt; GiveOnce  ciprofloxacin   IVPB 400 milliGRAM(s) IV Intermittent once  heparin   Injectable 5500 Unit(s) IV Push once  metroNIDAZOLE  IVPB 500 milliGRAM(s) IV Intermittent once  metroNIDAZOLE  IVPB 500 milliGRAM(s) IV Intermittent once  oxycodone    5 mG/acetaminophen 325 mG 1 Tablet(s) Oral Once  pantoprazole  Injectable 40 milliGRAM(s) IV Push Once

## 2024-04-09 NOTE — DISCHARGE NOTE PROVIDER - NSDCCPCAREPLAN_GEN_ALL_CORE_FT
PRINCIPAL DISCHARGE DIAGNOSIS  Diagnosis: DVT, lower extremity  Assessment and Plan of Treatment: A deep vein thrombosis (DVT) is a blood clot (thrombus) that usually occurs in a deep, larger vein of the lower leg or the pelvis, or in an upper extremity such as the arm. These are dangerous and can lead to serious and even life-threatening complications if the clot travels to the lungs. Symptoms include swelling of the arm or leg, warmth and redness of the arm or leg, and pain. Treatment may include taking a blood thinning medication; make sure to take anything prescribed as instructed.  SEEK IMMEDIATE MEDICAL CARE IF YOU HAVE ANY OF THE FOLLOWING SYMPTOMS: shortness of breath, chest pain, rapid or irregular heartbeat, lightheadedness/dizziness, coughing up blood, or any bleeding in your vomit, stool, or urine. These symptoms may represent a serious problem that is an emergency. Do not wait to see if the symptoms will go away. Call 911 and do not drive yourself to the hospital.

## 2024-04-09 NOTE — PHYSICAL THERAPY INITIAL EVALUATION ADULT - NS ASR RISK AREAS PT EVAL
History     Chief Complaint   Patient presents with     Fever     Pharyngitis     HPI    History obtained from family and patient    Clay is a 6 year old male  who presents at  7:25 PM with fever, sore throat and now ear pain . Drinking water only today.  Eating some solid food.  No vomiting. No diarrhea.  No abdominal pain.  Ill contacts none   Please see HPI for pertinent positives and negatives.  All other systems reviewed and found to be negative.        PMHx:  History reviewed. No pertinent past medical history.  History reviewed. No pertinent surgical history.  These were reviewed with the patient/family.    MEDICATIONS were reviewed and are as follows:   No current facility-administered medications for this encounter.      Current Outpatient Medications   Medication     acetaminophen (TYLENOL) 160 MG/5ML elixir     acetaminophen (TYLENOL) 160 MG/5ML oral liquid     diphenhydrAMINE (BENADRYL) 12.5 MG/5ML elixir     ibuprofen (ADVIL/MOTRIN) 100 MG/5ML suspension     polyethylene glycol (MIRALAX) powder     Sennosides (SENNA) 8.8 MG/5ML SYRP     sodium chloride (OCEAN) 0.65 % nasal spray       ALLERGIES:  Patient has no known allergies.    IMMUNIZATIONS:  utd by report.    SOCIAL HISTORY: Clay lives with parent and sibs .  He does   attend school.      I have reviewed the Medications, Allergies, Past Medical and Surgical History, and Social History in the Epic system.    Review of Systems  Please see HPI for pertinent positives and negatives.  All other systems reviewed and found to be negative.        Physical Exam   Pulse: 95  Temp: 98.7  F (37.1  C)  Resp: 16  Weight: 19.6 kg (43 lb 3.4 oz)  SpO2: 99 %      Physical Exam  Appearance: Alert and appropriate, well developed, nontoxic, with moist mucous membranes. Well appearing   HEENT: Head: Normocephalic and atraumatic. Eyes: PERRL, EOM grossly intact, conjunctivae and sclerae clear. Ears: Tympanic membranes clear bilaterally, without inflammation or  effusion. Nose: Nares with  scant clear discharge   Mouth/Throat: No oral lesions, pharynx with mild erythema, no exudate.  Neck: Supple, no masses, no meningismus. No significant cervical lymphadenopathy.  Pulmonary: No grunting, flaring, retractions or stridor. Good air entry, clear to auscultation bilaterally, with no rales, rhonchi, or wheezing.  Cardiovascular: Regular rate and rhythm, normal S1 and S2, with no murmurs.  Normal symmetric peripheral pulses and brisk cap refill.  Abdominal: Normal bowel sounds, soft, nontender, nondistended, with no masses and no hepatosplenomegaly.  Neurologic: Alert and oriented, cranial nerves II-XII grossly intact, moving all extremities equally with grossly normal coordination and normal gait.  Extremities/Back: No deformity, no CVA tenderness.  Skin: No significant rashes, ecchymoses, or lacerations.  Genitourinary: Deferred  Rectal:  Deferred    ED Course      Procedures    Results for orders placed or performed during the hospital encounter of 11/19/19 (from the past 24 hour(s))   Rapid strep group A screen POCT   Result Value Ref Range    Rapid Strep A Screen neg neg    Internal QC OK Yes        Medications - No data to display    Old chart from [facility] reviewed, noncontributory.        Critical care time:  none       Assessments & Plan (with Medical Decision Making)   6 yr old male with 2 days of sore throat and cough whose exam is remarkable for well hydrated child with findings of pharyngeal erythema  Without  exudate.  No clinical findings suggestive of peritonsillar abscess or deep neck infection or pneumonia. DDx includes strep vs viral pharyngitis  Strep test was negative   Culture is pending    Discussed assessment with parent and expected course of illness.  Patient is stable and can be safely discharged to home  Plan is   -to use tylenol and /or ibuprofen for pain or fever.  -encourage oral fluid intake and soft foods  -Follow up with PCP in 48 hours.  In  addition, we discussed  signs and symptoms to watch for and reasons to seek additional or emergent medical attention.  Parent verbalized understanding.     I have reviewed the nursing notes.    I have reviewed the findings, diagnosis, plan and need for follow up with the patient.  New Prescriptions    No medications on file       Final diagnoses:   None       11/19/2019   [facility] EMERGENCY DEPARTMENT     Yarely Rasmussen MD  11/25/19 0137     fall

## 2024-04-09 NOTE — PROGRESS NOTE ADULT - ASSESSMENT
Assessment and Plan  Case of an 88 year old female patient known to have a history of Dementia, Anxiety, insomnia, recent femur fracture s/p hemiarthroplasty in 03/2024 s/p discharge on Aspirin/Prophylactic Lovenox, HTN, moderate AS, HFpEF, DL, and CCY who was brought to the ED from NH on 04/07 for few episodes of hematochezia, found to be stable with evidence of new left peroneal DVT and sigmoid diverticulitis on imaging. We are consulted for concern of hematochezia and sigmoid diverticulitis.      Acute Uncomplicated Sigmoid Diverticulitis  Hematochezia in Setting of History of Constipation: Likely Hemorrhoidal Versus Diverticular; R/O Malignancy  Stable Hemoglobin: New Baseline Post Orthopedic Procedure in 03/2024  * No prior EGD or colonoscopy per patient  * Recent left femur fracture s/p hemiarthroplasty on 03/06/2024 -> discharged on Aspirin 81mg QD and prophylactic Lovenox post procedure  * Baseline Hb 15.2 in 03/05/2024 -> had hemiarthroplasty in 03/2024 -> Hb 10.6 on 03/12 (no previous iron studies)  * No hypotension or tachycardia on arrival  * ED Labs WBC 3.89, Hb 10.3, Plt 155, INR 1.13, LFTs 0.3/73/12/14  * LENA empty vault on 04/08   * CT Abdomen and Pelvis w/ IV Cont (04.07.24 @ 16:46) wall thickening and adjacent mild inflammatory change and fluid associated with sigmoid diverticulosis suspicious for diverticulitis. No definite evidence of abscess. Follow-up is recommended to exclude neoplasm.    RECOMMENDATIONS  - Would benefit from a non urgent outpatient colonoscopy in 6 weeks after completion of antibiotics and resolution of diverticulitis (follow up with our [facility] located at [address] [place] [address]. Phone Number: [phone])  - Continue IV Ciprofloxacin and IV Metronidazole   - Tolerated clear liquid diet. Advance diet as tolerated on 04/09  - Antiemetics: IV Zofran 4mg Q8h PRN if QTc normal  - Pain control: keep score at 01/10  - Monitor BM for recurrence of hematochezia; in case diarrhea develops, recommend sending stool studies (GI PCR, Clostridium Difficile, and Stool for ova/parasites/cx); check ESR and CRP    - In the setting of stable Hb, absence of active bleeding, and newly diagnosed acute left peroneal DVT, agree to continue short acting AC as benefits currently outweigh risks   - Trend CBC and keep Hb > 7      Thank you for your consult.  - Please note that plan was communicated with medical team.   - Please reach GI on [phone] during weekdays till 5pm.  - Please call the GI service line after 5pm on Weekdays and anytime on Weekends: [phone].      Angeli Sutton MD  PGY - 4 Gastroenterology Fellow   [facility]

## 2024-04-09 NOTE — PATIENT PROFILE ADULT - FUNCTIONAL ASSESSMENT - BASIC MOBILITY 6.
1-calculated by average/Not able to assess (calculate score using [facility] averaging method)

## 2024-04-09 NOTE — DISCHARGE NOTE PROVIDER - HOSPITAL COURSE
89y/o F w/ Mild Dementia (Baseline A&Ox2) w/ PMHx HTN, HLD, HFpEF (EF 75%, GIIDD, Pulm HTN and mod-severe Aortic Stenosis), recent Left Femur Fracture s/p Fall s/p Left Juan-arthroscopy (March 2024 - on ASA and Lovenox SubQ for DVT ppx), Anxiety and Insomnia brought in by EMS from nursing home for bloody stools for 5x days. Approximately last Wednesday, patient developed blood streaks on her stool.  Had a repeat CBC at the nursing home with 1 point drop in hemoglobin. Patient was noted to have left lower extremity edema which she states occurred 3 days prior to arrival. Denies shortness of breath or chest pain. Patient denies abdominal pain. Denies vomiting, fevers or any other medical complaints. Found to have acute DVT of LLE and started on Heparin gtt as per Vascular as patient's LENA was negative and no signs of active bleeding. Vitals: Temp 97.5F, /63, HR 57, RR 18, SpO2 96% on RA. Labs: Hgb 10.3 (previously 10.6), Cr 0.7 (at baseline). Imaging: CT A/P w/ IV contrast showed Wall thickening and adjacent mild inflammatory change and fluid associated with sigmoid diverticulosis suspicious for diverticulitis. No definite evidence of abscess. LE Venous Duplex shows DVT in left peroneal vein (pre-saxena report). In the ED: s/p Heparin gtt. s/p Cipro/Flagyl IV x1. s/p PPI IV x1. Admitted to medicine for further management of GI bleed and acute LLE DVT. 89y/o F w/ Mild Dementia (Baseline A&Ox2) w/ PMHx HTN, HLD, HFpEF (EF 75%, GIIDD, Pulm HTN and mod-severe Aortic Stenosis), recent Left Femur Fracture s/p Fall s/p Left Juan-arthroscopy (March 2024 - on ASA and Lovenox SubQ for DVT ppx), Anxiety and Insomnia brought in by EMS from nursing home for bloody stools for 5x days. Approximately last Wednesday, patient developed blood streaks on her stool.  Had a repeat CBC at the nursing home with 1 point drop in hemoglobin. Patient was noted to have left lower extremity edema which she states occurred 3 days prior to arrival. Denies shortness of breath or chest pain. Patient denies abdominal pain. Denies vomiting, fevers or any other medical complaints. Found to have acute DVT of LLE and started on Heparin gtt as per Vascular as patient's LENA was negative and no signs of active bleeding. Vitals: Temp 97.5F, /63, HR 57, RR 18, SpO2 96% on RA. Labs: Hgb 10.3 (previously 10.6), Cr 0.7 (at baseline). Imaging: CT A/P w/ IV contrast showed Wall thickening and adjacent mild inflammatory change and fluid associated with sigmoid diverticulosis suspicious for diverticulitis. No definite evidence of abscess. LE Venous Duplex shows DVT in left peroneal vein (pre-saxena report). In the ED: s/p Heparin gtt. s/p Cipro/Flagyl IV x1. s/p PPI IV x1. Admitted to medicine for further management of GI bleed and acute LLE DVT.    During hospitalization, no bloody BM noted. GI consulted, started on clear liquid diet, then advanced to DASH. Pt tolerated well. Started on IV Ciprofloxacin and IV Metronidazole for diverticulitis. Pt Would benefit from a non urgent outpatient colonoscopy in 6 weeks after completion of antibiotics and resolution of diverticulitis (follow up with our [facility] located at [address] [place] [address]. Phone Number: [phone]). Vascular consulted for DVT, started on heparin drip. No acute vascular surgical intervention. Pt is stable, switched to Eliquis. Pt will need Eliquis 10 BID x1 week, then 5 BID for total of three months.

## 2024-04-10 ENCOUNTER — TRANSCRIPTION ENCOUNTER (OUTPATIENT)
Age: 89
End: 2024-04-10

## 2024-04-10 VITALS
DIASTOLIC BLOOD PRESSURE: 72 MMHG | SYSTOLIC BLOOD PRESSURE: 152 MMHG | RESPIRATION RATE: 18 BRPM | OXYGEN SATURATION: 99 % | HEART RATE: 59 BPM

## 2024-04-10 LAB
ALBUMIN SERPL ELPH-MCNC: 3.2 G/DL — LOW (ref 3.5–5.2)
ALP SERPL-CCNC: 73 U/L — SIGNIFICANT CHANGE UP (ref 30–115)
ALT FLD-CCNC: 12 U/L — SIGNIFICANT CHANGE UP (ref 0–41)
ANION GAP SERPL CALC-SCNC: 10 MMOL/L — SIGNIFICANT CHANGE UP (ref 7–14)
APTT BLD: 44.5 SEC — HIGH (ref 27–39.2)
AST SERPL-CCNC: 11 U/L — SIGNIFICANT CHANGE UP (ref 0–41)
BASOPHILS # BLD AUTO: 0.02 K/UL — SIGNIFICANT CHANGE UP (ref 0–0.2)
BASOPHILS NFR BLD AUTO: 0.4 % — SIGNIFICANT CHANGE UP (ref 0–1)
BILIRUB SERPL-MCNC: 0.4 MG/DL — SIGNIFICANT CHANGE UP (ref 0.2–1.2)
BLD GP AB SCN SERPL QL: SIGNIFICANT CHANGE UP
BUN SERPL-MCNC: 7 MG/DL — LOW (ref 10–20)
CALCIUM SERPL-MCNC: 8.3 MG/DL — LOW (ref 8.4–10.5)
CHLORIDE SERPL-SCNC: 103 MMOL/L — SIGNIFICANT CHANGE UP (ref 98–110)
CO2 SERPL-SCNC: 26 MMOL/L — SIGNIFICANT CHANGE UP (ref 17–32)
CREAT SERPL-MCNC: 0.7 MG/DL — SIGNIFICANT CHANGE UP (ref 0.7–1.5)
EGFR: 84 ML/MIN/1.73M2 — SIGNIFICANT CHANGE UP
EOSINOPHIL # BLD AUTO: 0.03 K/UL — SIGNIFICANT CHANGE UP (ref 0–0.7)
EOSINOPHIL NFR BLD AUTO: 0.6 % — SIGNIFICANT CHANGE UP (ref 0–8)
GLUCOSE SERPL-MCNC: 92 MG/DL — SIGNIFICANT CHANGE UP (ref 70–99)
HCT VFR BLD CALC: 32.9 % — LOW (ref 37–47)
HGB BLD-MCNC: 10.9 G/DL — LOW (ref 12–16)
IMM GRANULOCYTES NFR BLD AUTO: 0.6 % — HIGH (ref 0.1–0.3)
LYMPHOCYTES # BLD AUTO: 1.56 K/UL — SIGNIFICANT CHANGE UP (ref 1.2–3.4)
LYMPHOCYTES # BLD AUTO: 31.6 % — SIGNIFICANT CHANGE UP (ref 20.5–51.1)
MAGNESIUM SERPL-MCNC: 1.5 MG/DL — LOW (ref 1.8–2.4)
MCHC RBC-ENTMCNC: 28.2 PG — SIGNIFICANT CHANGE UP (ref 27–31)
MCHC RBC-ENTMCNC: 33.1 G/DL — SIGNIFICANT CHANGE UP (ref 32–37)
MCV RBC AUTO: 85.2 FL — SIGNIFICANT CHANGE UP (ref 81–99)
MONOCYTES # BLD AUTO: 0.61 K/UL — HIGH (ref 0.1–0.6)
MONOCYTES NFR BLD AUTO: 12.4 % — HIGH (ref 1.7–9.3)
NEUTROPHILS # BLD AUTO: 2.68 K/UL — SIGNIFICANT CHANGE UP (ref 1.4–6.5)
NEUTROPHILS NFR BLD AUTO: 54.4 % — SIGNIFICANT CHANGE UP (ref 42.2–75.2)
NRBC # BLD: 0 /100 WBCS — SIGNIFICANT CHANGE UP (ref 0–0)
PLATELET # BLD AUTO: 216 K/UL — SIGNIFICANT CHANGE UP (ref 130–400)
PMV BLD: 9.6 FL — SIGNIFICANT CHANGE UP (ref 7.4–10.4)
POTASSIUM SERPL-MCNC: 3.4 MMOL/L — LOW (ref 3.5–5)
POTASSIUM SERPL-SCNC: 3.4 MMOL/L — LOW (ref 3.5–5)
PROT SERPL-MCNC: 5.3 G/DL — LOW (ref 6–8)
RBC # BLD: 3.86 M/UL — LOW (ref 4.2–5.4)
RBC # FLD: 15.4 % — HIGH (ref 11.5–14.5)
SODIUM SERPL-SCNC: 139 MMOL/L — SIGNIFICANT CHANGE UP (ref 135–146)
WBC # BLD: 4.93 K/UL — SIGNIFICANT CHANGE UP (ref 4.8–10.8)
WBC # FLD AUTO: 4.93 K/UL — SIGNIFICANT CHANGE UP (ref 4.8–10.8)

## 2024-04-10 PROCEDURE — 99232 SBSQ HOSP IP/OBS MODERATE 35: CPT

## 2024-04-10 PROCEDURE — 99233 SBSQ HOSP IP/OBS HIGH 50: CPT

## 2024-04-10 RX ORDER — OXYCODONE AND ACETAMINOPHEN 5; 325 MG/1; MG/1
1 TABLET ORAL ONCE
Refills: 0 | Status: DISCONTINUED | OUTPATIENT
Start: 2024-04-10 | End: 2024-04-10

## 2024-04-10 RX ORDER — IBUPROFEN 200 MG
400 TABLET ORAL ONCE
Refills: 0 | Status: DISCONTINUED | OUTPATIENT
Start: 2024-04-10 | End: 2024-04-10

## 2024-04-10 RX ADMIN — APIXABAN 10 MILLIGRAM(S): 2.5 TABLET, FILM COATED ORAL at 06:31

## 2024-04-10 RX ADMIN — Medication 12.5 MILLIGRAM(S): at 06:31

## 2024-04-10 RX ADMIN — PANTOPRAZOLE SODIUM 40 MILLIGRAM(S): 20 TABLET, DELAYED RELEASE ORAL at 06:31

## 2024-04-10 RX ADMIN — Medication 200 MILLIGRAM(S): at 06:38

## 2024-04-10 RX ADMIN — Medication 100 MILLIGRAM(S): at 06:30

## 2024-04-10 RX ADMIN — OXYCODONE AND ACETAMINOPHEN 1 TABLET(S): 5; 325 TABLET ORAL at 07:30

## 2024-04-10 RX ADMIN — BUDESONIDE AND FORMOTEROL FUMARATE DIHYDRATE 2 PUFF(S): 160; 4.5 AEROSOL RESPIRATORY (INHALATION) at 08:13

## 2024-04-10 RX ADMIN — Medication 650 MILLIGRAM(S): at 08:13

## 2024-04-10 RX ADMIN — Medication 650 MILLIGRAM(S): at 08:43

## 2024-04-10 RX ADMIN — TIOTROPIUM BROMIDE 2 PUFF(S): 18 CAPSULE ORAL; RESPIRATORY (INHALATION) at 08:13

## 2024-04-10 NOTE — DISCHARGE NOTE NURSING/CASE MANAGEMENT/SOCIAL WORK - NSDCPEFALRISK_GEN_ALL_CORE
For information on Fall & Injury Prevention, visit: [email or web address] OR  [email or web address] OR  https://www.cdc.gov/steadi/patient.html

## 2024-04-10 NOTE — PROGRESS NOTE ADULT - SUBJECTIVE AND OBJECTIVE BOX
Gastroenterology Follow Up Note      Location: [facility] ED Hold 052 A ([facility] ED Hold)  Patient Name: CAT JACOB  Age: 88y  Gender: Female      Chief Complaint  Patient is a 88y old Female who presents with a chief complaint of GI Bleed, Acute LLE DVT (10 Apr 2024 11:22)  Primary diagnosis of Acute embolism and thrombosis of deep vein of lower extremity      Reason for Consult  Hematochezia  Diverticulitis      Progress Note  This morning patient was seen and examined at bedside.    Today is hospital day 3d.  Patient is doing fine. No acute events overnight.   Patient's appetite is adequate, and she is tolerating diet and denies nausea or vomiting.   Patient denies any abdominal pain.  Last bowel movement was soft and was on 04/08 (no recurrent hematochezia per patient and RN).      Vital Signs in the last 24 hours   Vitals Summary T(C): 36.4 (04-10-24 @ 07:30), Max: 36.7 (04-09-24 @ 18:30)  HR: 59 (04-10-24 @ 13:30) (50 - 62)  BP: 152/72 (04-10-24 @ 13:30) (150/68 - 170/79)  RR: 18 (04-10-24 @ 13:30) (18 - 18)  SpO2: 99% (04-10-24 @ 13:30) (96% - 99%)  Vent Data   Intake/ Output   Measurements       Physical Exam  * General Appearance: Alert, cooperative, interactive, oriented to time, place, and person, in no acute distress  * Neck: Supple, symmetrical, trachea midline, no adenopathy   * Lungs: Good bilateral air entry, normal breath sounds   * Heart: Regular Rate and Rhythm, normal S1 and S2, no audible murmur, rub, or gallop  * Abdomen: Symmetric, non-distended, soft, non-tender, bowel sounds active all four quadrants, no masses  * Rectal: empty vault        Investigations   Laboratory Workup      - CBC:                        10.9   4.93  )-----------( 216      ( 10 Apr 2024 08:47 )             32.9       - Hgb Trend:  10.9  04-10-24 @ 08:47  10.8  04-09-24 @ 08:17  10.8  04-08-24 @ 07:47  10.5  04-08-24 @ 02:10        - Chemistry:  04-10    139  |  103  |  7<L>  ----------------------------<  92  3.4<L>   |  26  |  0.7    Ca    8.3<L>      10 Apr 2024 08:47  Mg     1.5     04-10    TPro  5.3<L>  /  Alb  3.2<L>  /  TBili  0.4  /  DBili  x   /  AST  11  /  ALT  12  /  AlkPhos  73  04-10    Liver panel trend:  TBili 0.4   /   AST 11   /   ALT 12   /   AlkP 73   /   Tptn 5.3   /   Alb 3.2    /   DBili --      04-10  TBili 0.4   /   AST 13   /   ALT 13   /   AlkP 72   /   Tptn 5.4   /   Alb 3.2    /   DBili --      04-09  TBili 0.3   /   AST 12   /   ALT 14   /   AlkP 73   /   Tptn 5.6   /   Alb 3.3    /   DBili --      04-07      - Coagulation Studies:  PTT - ( 10 Apr 2024 08:47 )  PTT:44.5 sec    - ABG:      - Cardiac Markers:        Microbiological Workup  Urinalysis Basic - ( 10 Apr 2024 08:47 )    Color: x / Appearance: x / SG: x / pH: x  Gluc: 92 mg/dL / Ketone: x  / Bili: x / Urobili: x   Blood: x / Protein: x / Nitrite: x   Leuk Esterase: x / RBC: x / WBC x   Sq Epi: x / Non Sq Epi: x / Bacteria: x        Culture - Urine (collected 08 Apr 2024 01:25)  Source: Clean Catch Clean Catch (Midstream)  Final Report (09 Apr 2024 07:01):    <10,000 CFU/mL Normal Urogenital Shannon    Culture - Blood (collected 07 Apr 2024 18:48)  Source: .Blood Blood  Preliminary Report (10 Apr 2024 01:01):    No growth at 48 Hours        Radiological Workup            Current Medications  Standing Medications  amLODIPine   Tablet 7.5 milliGRAM(s) Oral at bedtime  apixaban 10 milliGRAM(s) Oral every 12 hours  atorvastatin 40 milliGRAM(s) Oral at bedtime  budesonide 160 MICROgram(s)/formoterol 4.5 MICROgram(s) Inhaler 2 Puff(s) Inhalation two times a day  ciprofloxacin   IVPB 400 milliGRAM(s) IV Intermittent every 12 hours  diphenhydrAMINE Injectable 25 milliGRAM(s) IV Push once  donepezil 10 milliGRAM(s) Oral at bedtime  metoprolol tartrate 12.5 milliGRAM(s) Oral every 12 hours  metroNIDAZOLE  IVPB 500 milliGRAM(s) IV Intermittent every 8 hours  metroNIDAZOLE  IVPB      pantoprazole  Injectable 40 milliGRAM(s) IV Push every 12 hours  tiotropium 2.5 MICROgram(s) Inhaler 2 Puff(s) Inhalation daily  traZODone 75 milliGRAM(s) Oral at bedtime    PRN Medications  acetaminophen     Tablet .. 650 milliGRAM(s) Oral every 6 hours PRN Temp greater or equal to 38C (100.4F), Mild Pain (1 - 3)  albuterol    90 MICROgram(s) HFA Inhaler 2 Puff(s) Inhalation every 6 hours PRN Shortness of Breath and/or Wheezing  aluminum hydroxide/magnesium hydroxide/simethicone Suspension 30 milliLiter(s) Oral every 4 hours PRN Dyspepsia  melatonin 10 milliGRAM(s) Oral at bedtime PRN Insomnia  ondansetron Injectable 4 milliGRAM(s) IV Push every 8 hours PRN Nausea and/or Vomiting    Singles Doses Administered  (ADM OVERRIDE) 1 each &lt;see task&gt; GiveOnce  (ADM OVERRIDE) 1 each &lt;see task&gt; GiveOnce  (ADM OVERRIDE) 1 each &lt;see task&gt; GiveOnce  (ADM OVERRIDE) 1 each &lt;see task&gt; GiveOnce  (ADM OVERRIDE) 1 each &lt;see task&gt; GiveOnce  (ADM OVERRIDE) 1 each &lt;see task&gt; GiveOnce  (ADM OVERRIDE) 2 each &lt;see task&gt; GiveOnce  (ADM OVERRIDE) 1 each &lt;see task&gt; GiveOnce  (ADM OVERRIDE) 1 each &lt;see task&gt; GiveOnce  (ADM OVERRIDE) 1 each &lt;see task&gt; GiveOnce  (ADM OVERRIDE) 1 each &lt;see task&gt; GiveOnce  (ADM OVERRIDE) 1 each &lt;see task&gt; GiveOnce  (ADM OVERRIDE) 1 each &lt;see task&gt; GiveOnce  (ADM OVERRIDE) 1 each &lt;see task&gt; GiveOnce  (ADM OVERRIDE) 1 each &lt;see task&gt; GiveOnce  (ADM OVERRIDE) 2 each &lt;see task&gt; GiveOnce  (ADM OVERRIDE) 2 each &lt;see task&gt; GiveOnce  (ADM OVERRIDE) 1 each &lt;see task&gt; GiveOnce  (ADM OVERRIDE) 2 each &lt;see task&gt; GiveOnce  (ADM OVERRIDE) 1 each &lt;see task&gt; GiveOnce  (ADM OVERRIDE) 1 each &lt;see task&gt; GiveOnce  (ADM OVERRIDE) 1 each &lt;see task&gt; GiveOnce  (ADM OVERRIDE) 2 each &lt;see task&gt; GiveOnce  (ADM OVERRIDE) 1 each &lt;see task&gt; GiveOnce  (ADM OVERRIDE) 1 each &lt;see task&gt; GiveOnce  (ADM OVERRIDE) 1 each &lt;see task&gt; GiveOnce  (ADM OVERRIDE) 1 each &lt;see task&gt; GiveOnce  (ADM OVERRIDE) 1 each &lt;see task&gt; GiveOnce  (ADM OVERRIDE) 1 each &lt;see task&gt; GiveOnce  (ADM OVERRIDE) 1 each &lt;see task&gt; GiveOnce  (ADM OVERRIDE) 2 each &lt;see task&gt; GiveOnce  (ADM OVERRIDE) 1 each &lt;see task&gt; GiveOnce  (ADM OVERRIDE) 2 each &lt;see task&gt; GiveOnce  (ADM OVERRIDE) 1 each &lt;see task&gt; GiveOnce  (ADM OVERRIDE) 1 each &lt;see task&gt; GiveOnce  (ADM OVERRIDE) 2 each &lt;see task&gt; GiveOnce  (ADM OVERRIDE) 2 each &lt;see task&gt; GiveOnce  (ADM OVERRIDE) 1 each &lt;see task&gt; GiveOnce  (ADM OVERRIDE) 1 each &lt;see task&gt; GiveOnce  (ADM OVERRIDE) 2 each &lt;see task&gt; GiveOnce  (ADM OVERRIDE) 1 each &lt;see task&gt; GiveOnce  (ADM OVERRIDE) 2 each &lt;see task&gt; GiveOnce  (ADM OVERRIDE) 2 each &lt;see task&gt; GiveOnce  (ADM OVERRIDE) 1 each &lt;see task&gt; GiveOnce  ciprofloxacin   IVPB 400 milliGRAM(s) IV Intermittent once  heparin   Injectable 5500 Unit(s) IV Push once  metroNIDAZOLE  IVPB 500 milliGRAM(s) IV Intermittent once  metroNIDAZOLE  IVPB 500 milliGRAM(s) IV Intermittent once  oxycodone    5 mG/acetaminophen 325 mG 1 Tablet(s) Oral Once  oxycodone    5 mG/acetaminophen 325 mG 1 Tablet(s) Oral Once  pantoprazole  Injectable 40 milliGRAM(s) IV Push Once

## 2024-04-10 NOTE — DISCHARGE NOTE NURSING/CASE MANAGEMENT/SOCIAL WORK - PATIENT PORTAL LINK FT
You can access the FollowMyHealth Patient Portal offered by [facility] by registering at the following website: [email or web address]. By joining [facility]’s FollowMyHealth portal, you will also be able to view your health information using other applications (apps) compatible with our system.

## 2024-04-10 NOTE — PROGRESS NOTE ADULT - SUBJECTIVE AND OBJECTIVE BOX
Patient read [employer] message 05/11/2022 at 3:24 PM   NIDIAJESSECAT  88y  Female      Patient is a 88y old  Female who presents with a chief complaint of GI Bleed, Acute LLE DVT (09 Apr 2024 14:58)      INTERVAL HPI/OVERNIGHT EVENTS: no acute events overnight. no major complaints or concerns. no hematochezia or melena      REVIEW OF SYSTEMS:  CONSTITUTIONAL: No fever, weight loss, or fatigue  RESPIRATORY: No cough, wheezing, chills or hemoptysis; No shortness of breath  CARDIOVASCULAR: No chest pain, palpitations, dizziness, or leg swelling  GASTROINTESTINAL: No abdominal or epigastric pain. No nausea, vomiting, or hematemesis; No diarrhea or constipation. No melena or hematochezia.  GENITOURINARY: No dysuria, frequency, hematuria, or incontinence  NEUROLOGICAL: No headaches, memory loss, loss of strength, numbness, or tremors  SKIN: No itching, burning, rashes, or lesions   MUSCULOSKELETAL: No joint pain or swelling; No muscle, back, or extremity pain  PSYCHIATRIC: No depression, anxiety, mood swings, or difficulty sleeping  All other review of systems negative    T(C): 36.4 (04-10-24 @ 07:30), Max: 36.7 (04-09-24 @ 18:30)  HR: 50 (04-10-24 @ 07:30) (50 - 62)  BP: 159/65 (04-10-24 @ 07:30) (150/68 - 170/79)  RR: 18 (04-10-24 @ 07:30) (18 - 18)  SpO2: 96% (04-10-24 @ 07:30) (96% - 96%)  Wt(kg): --Vital Signs Last 24 Hrs  T(C): 36.4 (10 Apr 2024 07:30), Max: 36.7 (09 Apr 2024 18:30)  T(F): 97.5 (10 Apr 2024 07:30), Max: 98 (09 Apr 2024 18:30)  HR: 50 (10 Apr 2024 07:30) (50 - 62)  BP: 159/65 (10 Apr 2024 07:30) (150/68 - 170/79)  BP(mean): 93 (10 Apr 2024 07:30) (93 - 93)  RR: 18 (10 Apr 2024 07:30) (18 - 18)  SpO2: 96% (10 Apr 2024 07:30) (96% - 96%)    Parameters below as of 10 Apr 2024 07:30  Patient On (Oxygen Delivery Method): room air        PHYSICAL EXAM:  GENERAL: elderly, frail appearing F  PSYCH: no agitation, baseline mentation  NERVOUS SYSTEM:  Alert & Oriented X3   PULMONARY: symmetrical chest rise, no accessory muscle use  CARDIOVASCULAR: non tachycardic  GI:  Nondistended   EXTREMITIES:  No clubbing, cyanosis, or edema  SKIN: No rashes or lesions    Consultant(s) Notes Reviewed:  [x ] YES  [ ] NO    Discussed with Consultants/Other Providers [ x] YES     LABS                          10.9   4.93  )-----------( 216      ( 10 Apr 2024 08:47 )             32.9     04-10    139  |  103  |  7<L>  ----------------------------<  92  3.4<L>   |  26  |  0.7    Ca    8.3<L>      10 Apr 2024 08:47  Mg     1.5     04-10    TPro  5.3<L>  /  Alb  3.2<L>  /  TBili  0.4  /  DBili  x   /  AST  11  /  ALT  12  /  AlkPhos  73  04-10      Urinalysis Basic - ( 10 Apr 2024 08:47 )    Color: x / Appearance: x / SG: x / pH: x  Gluc: 92 mg/dL / Ketone: x  / Bili: x / Urobili: x   Blood: x / Protein: x / Nitrite: x   Leuk Esterase: x / RBC: x / WBC x   Sq Epi: x / Non Sq Epi: x / Bacteria: x      PTT - ( 10 Apr 2024 08:47 )  PTT:44.5 sec    POCT Blood Glucose.: 110 mg/dL (08 Apr 2024 11:49)    RADIOLOGY & ADDITIONAL TESTS:  - no images 4/10  Imaging Personally Reviewed:  [ ] YES  [x ] NO    HEALTH ISSUES - PROBLEM Dx:      MEDICATIONS  (STANDING):  amLODIPine   Tablet 7.5 milliGRAM(s) Oral at bedtime  apixaban 10 milliGRAM(s) Oral every 12 hours  atorvastatin 40 milliGRAM(s) Oral at bedtime  budesonide 160 MICROgram(s)/formoterol 4.5 MICROgram(s) Inhaler 2 Puff(s) Inhalation two times a day  ciprofloxacin   IVPB 400 milliGRAM(s) IV Intermittent every 12 hours  diphenhydrAMINE Injectable 25 milliGRAM(s) IV Push once  donepezil 10 milliGRAM(s) Oral at bedtime  metoprolol tartrate 12.5 milliGRAM(s) Oral every 12 hours  metroNIDAZOLE  IVPB 500 milliGRAM(s) IV Intermittent every 8 hours  metroNIDAZOLE  IVPB      oxycodone    5 mG/acetaminophen 325 mG 1 Tablet(s) Oral Once  pantoprazole  Injectable 40 milliGRAM(s) IV Push every 12 hours  tiotropium 2.5 MICROgram(s) Inhaler 2 Puff(s) Inhalation daily  traZODone 75 milliGRAM(s) Oral at bedtime    MEDICATIONS  (PRN):  acetaminophen     Tablet .. 650 milliGRAM(s) Oral every 6 hours PRN Temp greater or equal to 38C (100.4F), Mild Pain (1 - 3)  albuterol    90 MICROgram(s) HFA Inhaler 2 Puff(s) Inhalation every 6 hours PRN Shortness of Breath and/or Wheezing  aluminum hydroxide/magnesium hydroxide/simethicone Suspension 30 milliLiter(s) Oral every 4 hours PRN Dyspepsia  melatonin 10 milliGRAM(s) Oral at bedtime PRN Insomnia  ondansetron Injectable 4 milliGRAM(s) IV Push every 8 hours PRN Nausea and/or Vomiting

## 2024-04-10 NOTE — PROGRESS NOTE ADULT - ASSESSMENT
Assessment and Plan  Case of an 88 year old female patient known to have a history of Dementia, Anxiety, insomnia, recent femur fracture s/p hemiarthroplasty in 03/2024 s/p discharge on Aspirin/Prophylactic Lovenox, HTN, moderate AS, HFpEF, DL, and CCY who was brought to the ED from NH on 04/07 for few episodes of hematochezia, found to be stable with evidence of new left peroneal DVT and sigmoid diverticulitis on imaging. We are consulted for concern of hematochezia and sigmoid diverticulitis.      Acute Uncomplicated Sigmoid Diverticulitis  Hematochezia in Setting of History of Constipation: Likely Hemorrhoidal Versus Diverticular; R/O Malignancy  Stable Hemoglobin: New Baseline Post Orthopedic Procedure in 03/2024  * No prior EGD or colonoscopy per patient  * Recent left femur fracture s/p hemiarthroplasty on 03/06/2024 -> discharged on Aspirin 81mg QD and prophylactic Lovenox post procedure  * Baseline Hb 15.2 in 03/05/2024 -> had hemiarthroplasty in 03/2024 -> Hb 10.6 on 03/12 (no previous iron studies)  * No hypotension or tachycardia on arrival  * ED Labs WBC 3.89, Hb 10.3, Plt 155, INR 1.13, LFTs 0.3/73/12/14  * LENA empty vault on 04/08   * CT Abdomen and Pelvis w/ IV Cont (04.07.24 @ 16:46) wall thickening and adjacent mild inflammatory change and fluid associated with sigmoid diverticulosis suspicious for diverticulitis. No definite evidence of abscess. Follow-up is recommended to exclude neoplasm.    RECOMMENDATIONS  - Would benefit from a non urgent outpatient colonoscopy in 6 weeks after completion of antibiotics and resolution of diverticulitis (follow up with our [facility] located at [address] [place] [address]. Phone Number: [phone])  - Complete a course of Ciprofloxacin and Metronidazole on discharge  - Advance diet as tolerated  - Antiemetics: Zofran PRN if QTc normal  - Pain control: keep score at 01/10  - Monitor BM for recurrence of hematochezia; in case diarrhea develops, recommend sending stool studies (GI PCR, Clostridium Difficile, and Stool for ova/parasites/cx); check ESR and CRP    - In the setting of stable Hb, absence of active bleeding, and newly diagnosed acute left peroneal DVT, agree to continue short acting AC as benefits currently outweigh risks   - Trend CBC and keep Hb > 7      Thank you for your consult.  - Please note that plan was communicated with medical team.   - Please reach GI on [phone] during weekdays till 5pm.  - Please call the GI service line after 5pm on Weekdays and anytime on Weekends: [phone].      Angeli Sutton MD  PGY - 4 Gastroenterology Fellow   [facility]

## 2024-04-10 NOTE — PROGRESS NOTE ADULT - ASSESSMENT
89y/o F w/ Mild Dementia (Baseline A&Ox2) w/ PMHx HTN, HLD, HFpEF (EF 75%, GIIDD, Pulm HTN and mod-severe Aortic Stenosis), recent Left Femur Fracture s/p Fall s/p Left Juan-arthroscopy (March 2024 - on ASA and Lovenox SubQ for DVT ppx), Anxiety and Insomnia brought in by EMS from nursing home for bloody stools for 5x days. Found to have acute DVT of LLE and started on Heparin gtt as per Vascular as patient's LENA was negative and no signs of active bleeding. Admitted to medicine to manage acute LLE DVT iso suspected GI Bleed. Currently HDS, afebrile and sating well on RA    #Bloody Stools   - possibly 2/2 hemorrhoids?  - states blood streaked stools for 5x days  - was previously on ASA and Lovenox SubQ for DVT ppx  - Vitals: Temp 97.5F, /63, HR 57, RR 18, SpO2 96% on RA  - Hgb 10.3 (previously 10.6), BUN wnl  - LENA negative for blood, denies melena  - s/p PPI IV x1 in the ED  - c/w PPI IV BID  - tolerating advanced diet   - home ASA 81mg on hold - resume as per GI  - f/u GI consult; recs appreciated      #Acute LLE Peroneal DVT - likely provoked d/t recent surgery  #Recent Left Femur Fracture s/p Fall s/p Left Juan-arthroscopy (March 2024 - on ASA and Lovenox SubQ for DVT ppx)   - LE Venous Duplex shows DVT in left peroneal vein (pre-saxena report)  - seen by vascular - recommend AC - no surgical intervention at this time  - transitioned to doac today  - vascular recommend to discharge on Eliquis 10mg BID for 1 week and 5mg BID for 3 months  - If patient is unable to tolerate therapeutic anticoagulation, recommend wrapping LLE including the foot with ace bandage and elevation  - f/u w/ vascular Dr. Castro in 3-4 weeks o/p    #Wall Thickening and Adjacent Mild Inflammatory Change in the Sigmoid Colon  - CT A/P w/ IV contrast showed Wall thickening and adjacent mild inflammatory change and fluid associated with sigmoid diverticulosis suspicious for diverticulitis. No definite evidence of abscess  - s/p Cipro/Flagyl IV x1 in the ED--> complete a PO course outpatient  - afebrile, no diarrhea, no abdominal pain, no n/v  - tolerating PO intake  - monitor off abx    #HFpEF - not in exacerbation  #Moderate-Severe AS  #Pulm HTN  - ECHO from 3/2024 shows EF 75% w/ GIIDD, severe AS, pulm HTN, mTR  - was previously on Lasix PO, no longer on diuretics  - c/w home Metoprolol 12.5mg BID  - keep euvolemic  - strict I&Os, daily weight    #COPD vs Asthma? - not in exacerbation  - c/w home inhalers    #HTN  #HLD  - c/w home Amlodipine 7.5mg qD  - c/w home Atorvastatin 40mg qhs    #Dementia  #Anxiety  #Insomnia  - Baseline A&Ox2 (currently A&Ox1-2 to person and place)  - c/w home donepezil 10mg qD  - c/w home Trazodone 75mg qhs  - c/w home Melatonin 10mg qhs PRN for insomnia    #DVT ppx: Heparin gtt  #GI ppx: PPI IV BID  #Diet: clear liquid diet  #Activity: IAT  #Code Status: DNR/DNI  #Dispo: from NH, acute    #Progress Note Handoff  Pending (specify):  auth for rehab  Family discussion: updated  Disposition: return to rehab, tentatively tomorrow

## 2024-04-13 LAB
CULTURE RESULTS: SIGNIFICANT CHANGE UP
SPECIMEN SOURCE: SIGNIFICANT CHANGE UP

## 2024-04-16 DIAGNOSIS — F41.9 ANXIETY DISORDER, UNSPECIFIED: ICD-10-CM

## 2024-04-16 DIAGNOSIS — Z66 DO NOT RESUSCITATE: ICD-10-CM

## 2024-04-16 DIAGNOSIS — K57.33 DIVERTICULITIS OF LARGE INTESTINE WITHOUT PERFORATION OR ABSCESS WITH BLEEDING: ICD-10-CM

## 2024-04-16 DIAGNOSIS — I27.20 PULMONARY HYPERTENSION, UNSPECIFIED: ICD-10-CM

## 2024-04-16 DIAGNOSIS — I11.0 HYPERTENSIVE HEART DISEASE WITH HEART FAILURE: ICD-10-CM

## 2024-04-16 DIAGNOSIS — G47.00 INSOMNIA, UNSPECIFIED: ICD-10-CM

## 2024-04-16 DIAGNOSIS — I35.0 NONRHEUMATIC AORTIC (VALVE) STENOSIS: ICD-10-CM

## 2024-04-16 DIAGNOSIS — K64.9 UNSPECIFIED HEMORRHOIDS: ICD-10-CM

## 2024-04-16 DIAGNOSIS — K59.00 CONSTIPATION, UNSPECIFIED: ICD-10-CM

## 2024-04-16 DIAGNOSIS — F03.90 UNSPECIFIED DEMENTIA WITHOUT BEHAVIORAL DISTURBANCE: ICD-10-CM

## 2024-04-16 DIAGNOSIS — I82.452 ACUTE EMBOLISM AND THROMBOSIS OF LEFT PERONEAL VEIN: ICD-10-CM

## 2024-04-16 DIAGNOSIS — I50.32 CHRONIC DIASTOLIC (CONGESTIVE) HEART FAILURE: ICD-10-CM

## 2024-05-13 ENCOUNTER — NON-APPOINTMENT (OUTPATIENT)
Age: 89
End: 2024-05-13

## 2024-05-28 ENCOUNTER — APPOINTMENT (OUTPATIENT)
Dept: SURGERY | Facility: CLINIC | Age: 89
End: 2024-05-28
Payer: MEDICARE

## 2024-05-28 VITALS
BODY MASS INDEX: 21.53 KG/M2 | WEIGHT: 134 LBS | SYSTOLIC BLOOD PRESSURE: 132 MMHG | DIASTOLIC BLOOD PRESSURE: 84 MMHG | HEIGHT: 66 IN | OXYGEN SATURATION: 98 % | TEMPERATURE: 97 F | HEART RATE: 76 BPM

## 2024-05-28 DIAGNOSIS — R03.0 ELEVATED BLOOD-PRESSURE READING, W/OUT DIAGNOSIS OF HYPERTENSION: ICD-10-CM

## 2024-05-28 DIAGNOSIS — K92.2 GASTROINTESTINAL HEMORRHAGE, UNSPECIFIED: ICD-10-CM

## 2024-05-28 DIAGNOSIS — Z86.79 PERSONAL HISTORY OF OTHER DISEASES OF THE CIRCULATORY SYSTEM: ICD-10-CM

## 2024-05-28 DIAGNOSIS — D64.9 ANEMIA, UNSPECIFIED: ICD-10-CM

## 2024-05-28 DIAGNOSIS — Z86.59 PERSONAL HISTORY OF OTHER MENTAL AND BEHAVIORAL DISORDERS: ICD-10-CM

## 2024-05-28 DIAGNOSIS — Z87.09 PERSONAL HISTORY OF OTHER DISEASES OF THE RESPIRATORY SYSTEM: ICD-10-CM

## 2024-05-28 DIAGNOSIS — Z87.891 PERSONAL HISTORY OF NICOTINE DEPENDENCE: ICD-10-CM

## 2024-05-28 DIAGNOSIS — E78.5 HYPERLIPIDEMIA, UNSPECIFIED: ICD-10-CM

## 2024-05-28 DIAGNOSIS — Z86.718 PERSONAL HISTORY OF OTHER VENOUS THROMBOSIS AND EMBOLISM: ICD-10-CM

## 2024-05-28 DIAGNOSIS — F02.80 DEMENTIA IN OTHER DISEASES CLASSIFIED ELSEWHERE W/OUT BEHAVIORAL DISTURBANCE: ICD-10-CM

## 2024-05-28 DIAGNOSIS — Z86.39 PERSONAL HISTORY OF OTHER ENDOCRINE, NUTRITIONAL AND METABOLIC DISEASE: ICD-10-CM

## 2024-05-28 DIAGNOSIS — Z86.2 PERSONAL HISTORY OF DISEASES OF THE BLOOD AND BLOOD-FORMING ORGANS AND CERTAIN DISORDERS INVOLVING THE IMMUNE MECHANISM: ICD-10-CM

## 2024-05-28 DIAGNOSIS — J44.9 CHRONIC OBSTRUCTIVE PULMONARY DISEASE, UNSPECIFIED: ICD-10-CM

## 2024-05-28 PROCEDURE — 99203 OFFICE O/P NEW LOW 30 MIN: CPT

## 2024-06-04 PROBLEM — R03.0 BORDERLINE HYPERTENSION: Status: ACTIVE | Noted: 2024-05-28

## 2024-06-04 PROBLEM — F02.80 DEMENTIA ASSOCIATED WITH OTHER UNDERLYING DISEASE: Status: ACTIVE | Noted: 2024-05-28

## 2024-06-04 PROBLEM — Z87.891 FORMER CIGARETTE SMOKER: Status: ACTIVE | Noted: 2024-05-28

## 2024-06-04 PROBLEM — Z86.79 HISTORY OF PERIPHERAL VASCULAR DISEASE: Status: RESOLVED | Noted: 2024-05-28 | Resolved: 2024-06-04

## 2024-06-04 PROBLEM — K92.2 GI BLEEDING: Status: ACTIVE | Noted: 2024-05-28

## 2024-06-04 PROBLEM — Z86.718 HISTORY OF DEEP VENOUS THROMBOSIS: Status: RESOLVED | Noted: 2024-05-28 | Resolved: 2024-06-04

## 2024-06-04 PROBLEM — J44.9 COPD (CHRONIC OBSTRUCTIVE PULMONARY DISEASE): Status: ACTIVE | Noted: 2024-05-28

## 2024-06-04 PROBLEM — Z86.79 HISTORY OF AORTIC VALVE STENOSIS: Status: RESOLVED | Noted: 2024-05-28 | Resolved: 2024-06-04

## 2024-06-04 PROBLEM — Z86.2 HISTORY OF ANEMIA: Status: RESOLVED | Noted: 2024-05-28 | Resolved: 2024-06-04

## 2024-06-04 PROBLEM — Z86.39 HISTORY OF SIADH: Status: RESOLVED | Noted: 2024-05-28 | Resolved: 2024-06-04

## 2024-06-04 PROBLEM — D64.9 ANEMIA: Status: ACTIVE | Noted: 2024-05-28

## 2024-06-04 PROBLEM — Z86.79 HISTORY OF HYPERTENSION: Status: RESOLVED | Noted: 2024-05-28 | Resolved: 2024-06-04

## 2024-06-04 PROBLEM — E78.5 HYPERLIPIDEMIA: Status: ACTIVE | Noted: 2024-05-28

## 2024-06-04 PROBLEM — Z87.09 HISTORY OF CHRONIC OBSTRUCTIVE LUNG DISEASE: Status: RESOLVED | Noted: 2024-05-28 | Resolved: 2024-06-04

## 2024-06-04 PROBLEM — Z86.59 HISTORY OF DEMENTIA: Status: RESOLVED | Noted: 2024-05-28 | Resolved: 2024-06-04

## 2024-06-04 RX ORDER — METOPROLOL TARTRATE 75 MG/1
TABLET, FILM COATED ORAL
Refills: 0 | Status: ACTIVE | COMMUNITY

## 2024-06-04 RX ORDER — ATORVASTATIN CALCIUM 10 MG/1
10 TABLET, FILM COATED ORAL
Refills: 0 | Status: ACTIVE | COMMUNITY

## 2024-06-04 RX ORDER — FAMCICLOVIR 500 MG/1
187 TABLET, FILM COATED ORAL
Refills: 0 | Status: ACTIVE | COMMUNITY

## 2024-06-04 RX ORDER — POLYETHYLENE GLYCOL 3350
POWDER (GRAM) MISCELLANEOUS
Refills: 0 | Status: ACTIVE | COMMUNITY

## 2024-06-04 RX ORDER — TRAZODONE HYDROCHLORIDE 50 MG/1
50 TABLET ORAL
Refills: 0 | Status: ACTIVE | COMMUNITY

## 2024-06-04 RX ORDER — AMLODIPINE BESYLATE 5 MG/1
TABLET ORAL
Refills: 0 | Status: ACTIVE | COMMUNITY

## 2024-06-04 RX ORDER — DONEPEZIL HYDROCHLORIDE 10 MG/1
10 TABLET, FILM COATED ORAL
Refills: 0 | Status: ACTIVE | COMMUNITY

## 2024-06-04 RX ORDER — TIOTROPIUM BROMIDE 18 UG/1
CAPSULE ORAL; RESPIRATORY (INHALATION)
Refills: 0 | Status: ACTIVE | COMMUNITY

## 2024-06-04 RX ORDER — APIXABAN 2.5 MG/1
2.5 TABLET, FILM COATED ORAL
Refills: 0 | Status: ACTIVE | COMMUNITY

## 2024-06-04 NOTE — HISTORY OF PRESENT ILLNESS
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 PSHx of hip hemiarthroplasty, hysterectomy, and cholecystectomy, who presents for evaluation of bleeding per rectum. Patient reports being admitted to [facility] in March 2024 with a hip fracture. She underwent surgery at that time. She developed constipation requiring medication. Once the constipation resolved, she did have bleeding with her bowel movements. She also reports CT of the abdomen and pelvis on April 7, 2024. This showed a wall thickening and adjacent mild inflammatory change in the sigmoid colon, suspicious for diverticulitis. She was treated with antibiotics for that. Today, she reports her constipation, bleeding, and abdominal pain have all resolved. She was referred for a possible colonoscopy. Patient denies recent fevers, chills, nausea, or vomiting. She denies a family history of colon cancer, rectal cancer, or inflammatory bowel disease. She has not previously had a colonoscopy.

## 2024-06-04 NOTE — PHYSICAL EXAM
[FreeTextEntry1] : General: Awake, Alert, No Acute Distress Respiratory: Normal Respiratory Effort Abdomen: Soft, non-tender, non-distended.

## 2024-06-04 NOTE — ASSESSMENT
[FreeTextEntry1] : 88-year-old female with bleeding per rectum and sigmoid diverticulitis.  I spoke to the patient and her family regarding her condition. We discussed the possibility of malignancy given her age, bleeding, and no previous colonoscopy. At this time, the patient is refusing a colonoscopy. She also states that should she have cancer, she would not want to undergo treatments including chemotherapy, radiation, or surgery. At this time, we will refer her back to her primary care and her gastroenterologist for further care. They will contact our office if they want to undergo further evaluations.

## 2024-06-04 NOTE — ADDENDUM
[FreeTextEntry1] : I, Kristen Villa ([facility]) assisted in filling out this chart under the dictation of Dr. Timur Acosta on 05/28/2024.

## 2024-06-06 ENCOUNTER — APPOINTMENT (OUTPATIENT)
Dept: ORTHOPEDIC SURGERY | Facility: CLINIC | Age: 89
End: 2024-06-06
Payer: MEDICARE

## 2024-06-06 DIAGNOSIS — S72.002D FRACTURE OF UNSPECIFIED PART OF NECK OF LEFT FEMUR, SUBSEQUENT ENCOUNTER FOR CLOSED FRACTURE WITH ROUTINE HEALING: ICD-10-CM

## 2024-06-06 DIAGNOSIS — M48.061 SPINAL STENOSIS, LUMBAR REGION WITHOUT NEUROGENIC CLAUDICATION: ICD-10-CM

## 2024-06-06 PROCEDURE — 73502 X-RAY EXAM HIP UNI 2-3 VIEWS: CPT

## 2024-06-06 PROCEDURE — 72200 X-RAY EXAM SI JOINTS: CPT

## 2024-06-06 PROCEDURE — 99213 OFFICE O/P EST LOW 20 MIN: CPT

## 2024-06-06 PROCEDURE — 72100 X-RAY EXAM L-S SPINE 2/3 VWS: CPT

## 2024-06-06 NOTE — IMAGING
[de-identified] : On examination, patient has no focal neurological patient over the surgical site. Patient is able to ambulate with no pain, mild antalgic gait, still some stiffness over the hip.  Patient sits and gets up from a sitting position with very good control of her posture. On examination on the lower back, patient has tenderness to palpation on about L5-S1 and over the sacrum, skin is intact. She has some lidocaine patches applied over the lower back.   X-ray of the left hip was done in office today, 2 views were taken, hardware in good position. X-ray of the sacrum, 2 views were taken, negative for any acute fracture or dislocation.  X-ray of the lumbar spine, 2 views with taking, patient has significant degenerative changes seen over the spine processes on about L3, L4, L5. Spondylolisthesis noted on L4-L5 and significant intervertebral narrowing space on L5-S1

## 2024-06-06 NOTE — HISTORY OF PRESENT ILLNESS
[de-identified] : 88-year-old female here for repeat evaluation of her left hip, patient is accompanied by her daughter, patient is at the present time less than office skilled nursing facility, she also suffers of some Cognitive impairment. Patient had a displaced femoral neck fracture on March 5, 2024 treated with left hip hemiarthroplasty overall she is doing well, today she denies any left hip pain she complains of pain over the sacrum.  As per patient the pain on her sacrum is worse in the morning when she wakes up. There is no history of any injuries after the fall on March 5, as per daughter patient walks pretty well with a walker or a cane, since patient has significant cognitive impairment patient only ambulates when patient has some supervision.

## 2024-06-06 NOTE — DISCUSSION/SUMMARY
[de-identified] : Impression: 3 months status post hemiarthroplasty to the left hip.  Lower back pain possibly due to lumbar degenerative Stenosis  Plan: Patient was advised for physical therapy. For her lower back and hip. I also advised for observation on the bed position and send the patient states that the pain is worse in the morning. Patient was advised for topical anti-inflammatory such as diclofenac patches  Follow-up: 6 months if needed.

## 2024-07-05 NOTE — IMAGING
[de-identified] : Mae animated elderly woman sits comfortably in a wheelchair.  Accompanied by her daughter and an aide.  Physical examination: Left hip: Surgical incisions clean dry and intact no erythema duration or fluctuance.  No tenderness palpation at surgical site.  No tenderness about her groin.  No tenderness of the gluteal muscle mass.  She is able to stand from a seated position with no assistance from another person.  Her gait is surprisingly steady.  Radiographs: Left hip (AP, lateral): Well-seated bipolar hemiarthroplasty.  No evidence of loosening or subsidence. Robert ADAMS: Patient reassessed. HR fluctuating 110-115 bpm, patient due for her routine metoprolol and Diltiazem. Both meds adminstered. Patient told she needs to take an Abx for UTI. Patient will follow up with PCP. Follow up emphasized. Return precautions explained. Robert ADAMS: Swedish interpretor used - [employer]. Patient reassessed. HR fluctuating 110-115 bpm, patient due for her routine metoprolol and Diltiazem. Both meds adminstered. Patient told she needs to take an Abx for UTI. Patient will follow up with PCP. Follow up emphasized. Return precautions explained. Robert ADAMS: [place] Leonean interpretor used - Uz[employer].   Patient reassessed. HR fluctuating 110-115 bpm, patient due for her routine metoprolol and Diltiazem. Both meds adminstered. Patient told that she needs to take an Abx for UTI. Patient has been able to gait with walker and will follow up with PCP. Follow up emphasized. Return precautions explained. Patient resides at home on 2nd floor, will plan non emergent transfer for assistance. Attending Reese Wolf:  Patient was signed out to me, hemodynam stable, here after a fall with largely nonactionable w/u, + UA, maybe etiology for patient getting up and falling, mechanical. Able to ambulate with walker in ED. Given home av monique blockers 2/2 afib with rvr to 110s with improved to 80s. All was discussed, w/u, return precautions with verbal understanding expressed. Stable for dc back home with nonemergent, outpt f/u, and abxs.

## 2024-08-09 ENCOUNTER — OUTPATIENT (OUTPATIENT)
Dept: OUTPATIENT SERVICES | Facility: HOSPITAL | Age: 89
LOS: 1 days | End: 2024-08-09
Payer: MEDICARE

## 2024-08-09 DIAGNOSIS — Z90.710 ACQUIRED ABSENCE OF BOTH CERVIX AND UTERUS: Chronic | ICD-10-CM

## 2024-08-09 DIAGNOSIS — F01.50 VASCULAR DEMENTIA, UNSPECIFIED SEVERITY, WITHOUT BEHAVIORAL DISTURBANCE, PSYCHOTIC DISTURBANCE, MOOD DISTURBANCE, AND ANXIETY: ICD-10-CM

## 2024-08-09 DIAGNOSIS — Z98.890 OTHER SPECIFIED POSTPROCEDURAL STATES: Chronic | ICD-10-CM

## 2024-08-09 DIAGNOSIS — Z00.8 ENCOUNTER FOR OTHER GENERAL EXAMINATION: ICD-10-CM

## 2024-08-09 DIAGNOSIS — Z90.49 ACQUIRED ABSENCE OF OTHER SPECIFIED PARTS OF DIGESTIVE TRACT: Chronic | ICD-10-CM

## 2024-08-09 PROCEDURE — 70551 MRI BRAIN STEM W/O DYE: CPT

## 2024-08-09 PROCEDURE — 70551 MRI BRAIN STEM W/O DYE: CPT | Mod: 26

## 2024-08-10 DIAGNOSIS — F01.50 VASCULAR DEMENTIA, UNSPECIFIED SEVERITY, WITHOUT BEHAVIORAL DISTURBANCE, PSYCHOTIC DISTURBANCE, MOOD DISTURBANCE, AND ANXIETY: ICD-10-CM

## 2024-10-02 ENCOUNTER — TRANSCRIPTION ENCOUNTER (OUTPATIENT)
Age: 89
End: 2024-10-02

## 2024-10-02 ENCOUNTER — APPOINTMENT (OUTPATIENT)
Dept: VASCULAR SURGERY | Facility: CLINIC | Age: 89
End: 2024-10-02
Payer: MEDICARE

## 2024-10-02 VITALS — DIASTOLIC BLOOD PRESSURE: 63 MMHG | HEART RATE: 56 BPM | SYSTOLIC BLOOD PRESSURE: 134 MMHG

## 2024-10-02 VITALS — BODY MASS INDEX: 22.6 KG/M2 | WEIGHT: 140 LBS

## 2024-10-02 VITALS — HEIGHT: 66 IN

## 2024-10-02 DIAGNOSIS — I82.452 LT PERONEAL VEIN ACUTE EMBOLISM AND THROMBOSIS: ICD-10-CM

## 2024-10-02 PROCEDURE — 99203 OFFICE O/P NEW LOW 30 MIN: CPT

## 2024-10-02 RX ORDER — BUDESONIDE AND FORMOTEROL FUMARATE DIHYDRATE 160; 4.5 UG/1; UG/1
AEROSOL RESPIRATORY (INHALATION)
Refills: 0 | Status: ACTIVE | COMMUNITY

## 2024-10-02 NOTE — PHYSICAL EXAM
[Ankle Swelling (On Exam)] : present [Ankle Swelling Bilaterally] : bilaterally  [Varicose Veins Of Lower Extremities] : bilaterally [Ankle Swelling On The Right] : mild [] : not present

## 2024-10-02 NOTE — ASSESSMENT
[FreeTextEntry1] : The patient is an 89-year-old female with a history of left peroneal vein DVT.  The patient was treated with 3 months of anticoagulation.  She has been off anticoagulation for the past 2 months.  Today she presents for follow-up evaluation.  I reviewed her outpatient venous duplex scan that shows no evidence of acute or chronic deep vein thrombosis present.  The patient remains asymptomatic.  From my standpoint no need for anticoagulation at this time.  She should follow-up with me as needed.   I, Dr. Castro, personally performed the evaluation and management (E/M) services for this established patient who presents today with (a) new problem(s)/exacerbation of (an) existing condition(s). That E/M includes conducting the clinically appropriate interval history &/or exam, assessing all new/exacerbated conditions, and establishing a new plan of care. Today, my ERON, Ana Forest was here to observe my evaluation and management service for this new problem/exacerbated condition and follow the plan of care established by me going forward.  Thank you for allowing me to participate in the care of your patient.   Sincerely,   Cade Castro MD, RPVI, FACS Associate Professor of Surgery , Vascular Fellowship Director of Limb Salvage Surgery [facility] [employer] [facility] at [facility]

## 2024-10-02 NOTE — DATA REVIEWED
[FreeTextEntry1] :     ACC: [id number] EXAM: DUPLEX SCAN EXT VEINS LOWER BI ORDERED BY: KYLE LEAL  PROCEDURE DATE: 04/07/2024    INTERPRETATION: CLINICAL INFORMATION: 88-year-old female with leg swelling  TECHNIQUE: Duplex sonography of the BILATERAL LOWER extremity veins with color and spectral Doppler, with and without compression.  FINDINGS:  RIGHT: Normal compressibility of the RIGHT common femoral, femoral and popliteal veins. Doppler examination shows normal spontaneous and phasic flow. No RIGHT calf vein thrombosis is detected.  LEFT: Normal compressibility of the LEFT common femoral, femoral and popliteal veins. Doppler examination shows normal spontaneous and phasic flow. DVT in left peroneal vein.  IMPRESSION: Right lower extremity negative for DVT.  DVT in left peroneal vein.    --- End of Report ---  Venous duplex performed on November 11, 2024.  Outpatient study reviewed no evidence of acute or chronic deep vein thrombosis visualized in the bilateral lower extremities   CHAVO TORRES MD; Vascular Fellow This document has been electronically signed. DERRICK LOVING MD; Attending Vascular Surgery This document has been electronically signed. Apr 8 2024 10:24AM

## 2024-10-02 NOTE — HISTORY OF PRESENT ILLNESS
[FreeTextEntry1] : The patient is an 89-year-old female with an extensive past medical history including ORIF of her hip and GI bleed.  The patient was hospitalized back in April and developed a left calf vein DVT.  The patient was treated with Eliquis for 3 months.  The patient has been off anticoagulation for the past 2 months.  Today she presents for follow-up evaluation.  She is ambulating with a walker and has been discharged home from rehab.  Patient denies leg pain or swelling presently.

## 2024-11-11 ENCOUNTER — APPOINTMENT (OUTPATIENT)
Dept: ORTHOPEDIC SURGERY | Facility: CLINIC | Age: 89
End: 2024-11-11

## 2024-11-11 VITALS — HEIGHT: 63 IN | BODY MASS INDEX: 25.52 KG/M2 | WEIGHT: 144 LBS

## 2024-11-11 VITALS — WEIGHT: 142 LBS | HEIGHT: 68 IN | BODY MASS INDEX: 21.52 KG/M2

## 2024-11-11 DIAGNOSIS — M54.16 RADICULOPATHY, LUMBAR REGION: ICD-10-CM

## 2024-11-11 PROCEDURE — 72110 X-RAY EXAM L-2 SPINE 4/>VWS: CPT

## 2024-11-11 PROCEDURE — 99213 OFFICE O/P EST LOW 20 MIN: CPT

## 2024-12-16 ENCOUNTER — APPOINTMENT (OUTPATIENT)
Dept: ORTHOPEDIC SURGERY | Facility: CLINIC | Age: 89
End: 2024-12-16

## 2025-02-06 ENCOUNTER — APPOINTMENT (OUTPATIENT)
Dept: ORTHOPEDIC SURGERY | Facility: CLINIC | Age: 89
End: 2025-02-06

## 2025-03-07 ENCOUNTER — INPATIENT (INPATIENT)
Facility: HOSPITAL | Age: 89
LOS: 3 days | Discharge: HOSPICE | DRG: 314 | End: 2025-03-11
Attending: INTERNAL MEDICINE | Admitting: INTERNAL MEDICINE
Payer: MEDICARE

## 2025-03-07 VITALS
TEMPERATURE: 98 F | OXYGEN SATURATION: 94 % | SYSTOLIC BLOOD PRESSURE: 186 MMHG | WEIGHT: 141.98 LBS | HEART RATE: 56 BPM | HEIGHT: 68 IN | DIASTOLIC BLOOD PRESSURE: 63 MMHG | RESPIRATION RATE: 18 BRPM

## 2025-03-07 DIAGNOSIS — Z90.710 ACQUIRED ABSENCE OF BOTH CERVIX AND UTERUS: Chronic | ICD-10-CM

## 2025-03-07 DIAGNOSIS — Z90.49 ACQUIRED ABSENCE OF OTHER SPECIFIED PARTS OF DIGESTIVE TRACT: Chronic | ICD-10-CM

## 2025-03-07 DIAGNOSIS — Z98.890 OTHER SPECIFIED POSTPROCEDURAL STATES: Chronic | ICD-10-CM

## 2025-03-07 DIAGNOSIS — M79.603 PAIN IN ARM, UNSPECIFIED: ICD-10-CM

## 2025-03-07 LAB
ALBUMIN SERPL ELPH-MCNC: 4.2 G/DL — SIGNIFICANT CHANGE UP (ref 3.5–5.2)
ALP SERPL-CCNC: 71 U/L — SIGNIFICANT CHANGE UP (ref 30–115)
ALT FLD-CCNC: 9 U/L — SIGNIFICANT CHANGE UP (ref 0–41)
ANION GAP SERPL CALC-SCNC: 10 MMOL/L — SIGNIFICANT CHANGE UP (ref 7–14)
APTT BLD: 36.4 SEC — SIGNIFICANT CHANGE UP (ref 27–39.2)
AST SERPL-CCNC: 11 U/L — SIGNIFICANT CHANGE UP (ref 0–41)
BASOPHILS # BLD AUTO: 0.02 K/UL — SIGNIFICANT CHANGE UP (ref 0–0.2)
BASOPHILS NFR BLD AUTO: 0.3 % — SIGNIFICANT CHANGE UP (ref 0–1)
BILIRUB SERPL-MCNC: 0.6 MG/DL — SIGNIFICANT CHANGE UP (ref 0.2–1.2)
BUN SERPL-MCNC: 16 MG/DL — SIGNIFICANT CHANGE UP (ref 10–20)
CALCIUM SERPL-MCNC: 8.8 MG/DL — SIGNIFICANT CHANGE UP (ref 8.4–10.5)
CHLORIDE SERPL-SCNC: 105 MMOL/L — SIGNIFICANT CHANGE UP (ref 98–110)
CO2 SERPL-SCNC: 27 MMOL/L — SIGNIFICANT CHANGE UP (ref 17–32)
CREAT SERPL-MCNC: 0.8 MG/DL — SIGNIFICANT CHANGE UP (ref 0.7–1.5)
EGFR: 71 ML/MIN/1.73M2 — SIGNIFICANT CHANGE UP
EGFR: 71 ML/MIN/1.73M2 — SIGNIFICANT CHANGE UP
EOSINOPHIL # BLD AUTO: 0.03 K/UL — SIGNIFICANT CHANGE UP (ref 0–0.7)
EOSINOPHIL NFR BLD AUTO: 0.5 % — SIGNIFICANT CHANGE UP (ref 0–8)
GLUCOSE SERPL-MCNC: 73 MG/DL — SIGNIFICANT CHANGE UP (ref 70–99)
HCT VFR BLD CALC: 37.3 % — SIGNIFICANT CHANGE UP (ref 37–47)
HGB BLD-MCNC: 11.9 G/DL — LOW (ref 12–16)
IMM GRANULOCYTES NFR BLD AUTO: 0.5 % — HIGH (ref 0.1–0.3)
INR BLD: 1.02 RATIO — SIGNIFICANT CHANGE UP (ref 0.65–1.3)
LYMPHOCYTES # BLD AUTO: 2.08 K/UL — SIGNIFICANT CHANGE UP (ref 1.2–3.4)
LYMPHOCYTES # BLD AUTO: 31.6 % — SIGNIFICANT CHANGE UP (ref 20.5–51.1)
MAGNESIUM SERPL-MCNC: 1.7 MG/DL — LOW (ref 1.8–2.4)
MCHC RBC-ENTMCNC: 28.3 PG — SIGNIFICANT CHANGE UP (ref 27–31)
MCHC RBC-ENTMCNC: 31.9 G/DL — LOW (ref 32–37)
MCV RBC AUTO: 88.8 FL — SIGNIFICANT CHANGE UP (ref 81–99)
MONOCYTES # BLD AUTO: 0.79 K/UL — HIGH (ref 0.1–0.6)
MONOCYTES NFR BLD AUTO: 12 % — HIGH (ref 1.7–9.3)
NEUTROPHILS # BLD AUTO: 3.64 K/UL — SIGNIFICANT CHANGE UP (ref 1.4–6.5)
NEUTROPHILS NFR BLD AUTO: 55.1 % — SIGNIFICANT CHANGE UP (ref 42.2–75.2)
NRBC BLD AUTO-RTO: 0 /100 WBCS — SIGNIFICANT CHANGE UP (ref 0–0)
NT-PROBNP SERPL-SCNC: 1888 PG/ML — HIGH (ref 0–300)
PLATELET # BLD AUTO: 191 K/UL — SIGNIFICANT CHANGE UP (ref 130–400)
PMV BLD: 9.7 FL — SIGNIFICANT CHANGE UP (ref 7.4–10.4)
POTASSIUM SERPL-MCNC: 4 MMOL/L — SIGNIFICANT CHANGE UP (ref 3.5–5)
POTASSIUM SERPL-SCNC: 4 MMOL/L — SIGNIFICANT CHANGE UP (ref 3.5–5)
PROT SERPL-MCNC: 6.3 G/DL — SIGNIFICANT CHANGE UP (ref 6–8)
PROTHROM AB SERPL-ACNC: 12 SEC — SIGNIFICANT CHANGE UP (ref 9.95–12.87)
RBC # BLD: 4.2 M/UL — SIGNIFICANT CHANGE UP (ref 4.2–5.4)
RBC # FLD: 16.6 % — HIGH (ref 11.5–14.5)
SODIUM SERPL-SCNC: 142 MMOL/L — SIGNIFICANT CHANGE UP (ref 135–146)
TROPONIN T, HIGH SENSITIVITY RESULT: 13 NG/L — SIGNIFICANT CHANGE UP (ref 6–13)
TROPONIN T, HIGH SENSITIVITY RESULT: 14 NG/L — HIGH (ref 6–13)
WBC # BLD: 6.59 K/UL — SIGNIFICANT CHANGE UP (ref 4.8–10.8)
WBC # FLD AUTO: 6.59 K/UL — SIGNIFICANT CHANGE UP (ref 4.8–10.8)

## 2025-03-07 PROCEDURE — 93306 TTE W/DOPPLER COMPLETE: CPT

## 2025-03-07 PROCEDURE — 94640 AIRWAY INHALATION TREATMENT: CPT

## 2025-03-07 PROCEDURE — 82378 CARCINOEMBRYONIC ANTIGEN: CPT

## 2025-03-07 PROCEDURE — 86480 TB TEST CELL IMMUN MEASURE: CPT

## 2025-03-07 PROCEDURE — 99223 1ST HOSP IP/OBS HIGH 75: CPT

## 2025-03-07 PROCEDURE — 97110 THERAPEUTIC EXERCISES: CPT | Mod: GP

## 2025-03-07 PROCEDURE — 71045 X-RAY EXAM CHEST 1 VIEW: CPT | Mod: 26

## 2025-03-07 PROCEDURE — 85025 COMPLETE CBC W/AUTO DIFF WBC: CPT

## 2025-03-07 PROCEDURE — 36415 COLL VENOUS BLD VENIPUNCTURE: CPT

## 2025-03-07 PROCEDURE — 73060 X-RAY EXAM OF HUMERUS: CPT | Mod: 26,LT

## 2025-03-07 PROCEDURE — 74177 CT ABD & PELVIS W/CONTRAST: CPT | Mod: MC

## 2025-03-07 PROCEDURE — 80048 BASIC METABOLIC PNL TOTAL CA: CPT

## 2025-03-07 PROCEDURE — 83735 ASSAY OF MAGNESIUM: CPT

## 2025-03-07 PROCEDURE — 83520 IMMUNOASSAY QUANT NOS NONAB: CPT

## 2025-03-07 PROCEDURE — 80053 COMPREHEN METABOLIC PANEL: CPT

## 2025-03-07 PROCEDURE — 84484 ASSAY OF TROPONIN QUANT: CPT

## 2025-03-07 PROCEDURE — 93010 ELECTROCARDIOGRAM REPORT: CPT

## 2025-03-07 PROCEDURE — 71275 CT ANGIOGRAPHY CHEST: CPT | Mod: 26

## 2025-03-07 PROCEDURE — 97162 PT EVAL MOD COMPLEX 30 MIN: CPT | Mod: GP

## 2025-03-07 PROCEDURE — 99285 EMERGENCY DEPT VISIT HI MDM: CPT

## 2025-03-07 PROCEDURE — 97116 GAIT TRAINING THERAPY: CPT | Mod: GP

## 2025-03-07 PROCEDURE — 73030 X-RAY EXAM OF SHOULDER: CPT | Mod: 26,LT

## 2025-03-07 PROCEDURE — 93970 EXTREMITY STUDY: CPT

## 2025-03-07 RX ORDER — MAGNESIUM SULFATE 500 MG/ML
2 SYRINGE (ML) INJECTION ONCE
Refills: 0 | Status: COMPLETED | OUTPATIENT
Start: 2025-03-07 | End: 2025-03-07

## 2025-03-07 RX ADMIN — Medication 25 GRAM(S): at 20:33

## 2025-03-07 NOTE — ED ADULT NURSE NOTE - NSFALLRISKINTERV_ED_ALL_ED

## 2025-03-07 NOTE — H&P ADULT - NSHPLABSRESULTS_GEN_ALL_CORE
11.9   6.59  )-----------( 191      ( 07 Mar 2025 18:18 )             37.3   03-07    142  |  105  |  16  ----------------------------<  73  4.0   |  27  |  0.8    Ca    8.8      07 Mar 2025 18:19  Mg     1.7     03-07    TPro  6.3  /  Alb  4.2  /  TBili  0.6  /  DBili  x   /  AST  11  /  ALT  9   /  AlkPhos  71  03-07

## 2025-03-07 NOTE — ED ADULT NURSE NOTE - OBJECTIVE STATEMENT
Pt c/o chest pain and L sided arm pain since today 2:30pm. has hx of COPD. Pt on continuous cardiac monitoring

## 2025-03-07 NOTE — H&P ADULT - ATTENDING COMMENTS
89-year-old female with past medical history of hypertension, hyperlipidemia, CHF, pulmonary hypertension, aortic stenosis, COPD not on oxygen, history of DVT not on anticoagulation presents for evaluation of left shoulder to arm pain.  She denies any trauma or injury. DNR/DNI, daughter providing additional history. Admitted to medicine for further management.       Agree  with assessment  except for changes below.   Vital Signs Last 24 Hrs  T(C): 36.5 (07 Mar 2025 16:31), Max: 36.5 (07 Mar 2025 16:31)  T(F): 97.7 (07 Mar 2025 16:31), Max: 97.7 (07 Mar 2025 16:31)  HR: 72 (07 Mar 2025 22:41) (56 - 72)  BP: 165/72 (07 Mar 2025 22:41) (165/72 - 186/63)  BP(mean): --  RR: 18 (07 Mar 2025 22:41) (18 - 18)  SpO2: 98% (07 Mar 2025 22:41) (94% - 98%)    Parameters below as of 07 Mar 2025 22:41  Patient On (Oxygen Delivery Method): room air    CT Chest: No evidence of pulmonary embolism.  Mediastinal lymphadenopathy is noted. This includes increased soft tissue   density (4.3 x 2.3 cm) in the subcarinal region suspicious for lymphadenopathy and/or esophageal mass. Further investigation is recommended. The pulmonary artery measures 3.4 cm which may be seen with pulmonary hypertension.        IMPRESSION  Atypical Chest Pain Etiology  Uncertain   Mediastinal Mass : Subcarinal/Esophgeal Mass Suspicious for Neoplasia on CTA Chest  Moderate to Severe Aortic Stenosis  Hx HFpEF (EF 75%, G2DD 3/2024)  Hx Pulmonary Hypertension  TTE (03/2024): EF 75%, G2DD, Mild TR, Moderate to Severe AS, Peak/mean PG 71/40, DONTE 1.1 cm ^2  ECG: ine Sinus bradycardia with 1st degree A-V block Rightward axis  Trops  Stable, BNP 1.8K    s/p Mg 2g IV  - Trend troponin   -  Pulm consult for Bx  vs  IR   - Consider Cardiology consult then structural for TAVR as patient is very functional   - Keep Mg >2, K >4  - Aspiration Precautions   - 24 hour  Tele Monitoring     Constipation  - c/w Bowel Regimen     Hx HTN  and  HLD - Lopressor ,  Amlodipine, Lipitor     Hx  Cognitive Decline -  Donepezil    Hx COPD not on Home O2  - Duoneb q6h  - albuterol HFA PRN q6h   - monitor O2 on RA  - Symbicort  Spiriva 89-year-old female with past medical history of hypertension, hyperlipidemia, CHF, pulmonary hypertension, aortic stenosis, COPD not on oxygen, history of DVT not on anticoagulation presents for evaluation of left shoulder to arm pain.  She denies any trauma or injury. DNR/DNI, daughter providing additional history. Admitted to medicine for further management.       Agree  with assessment  except for changes below.   Vital Signs Last 24 Hrs  T(C): 36.5 (07 Mar 2025 16:31), Max: 36.5 (07 Mar 2025 16:31)  T(F): 97.7 (07 Mar 2025 16:31), Max: 97.7 (07 Mar 2025 16:31)  HR: 72 (07 Mar 2025 22:41) (56 - 72)  BP: 165/72 (07 Mar 2025 22:41) (165/72 - 186/63)  BP(mean): --  RR: 18 (07 Mar 2025 22:41) (18 - 18)  SpO2: 98% (07 Mar 2025 22:41) (94% - 98%)    Parameters below as of 07 Mar 2025 22:41  Patient On (Oxygen Delivery Method): room air    CT Chest: No evidence of pulmonary embolism.  Mediastinal lymphadenopathy is noted. This includes increased soft tissue   density (4.3 x 2.3 cm) in the subcarinal region suspicious for lymphadenopathy and/or esophageal mass. Further investigation is recommended. The pulmonary artery measures 3.4 cm which may be seen with pulmonary hypertension.        IMPRESSION  Atypical Chest Pain Etiology  Uncertain   Mediastinal Mass : Subcarinal/Esophgeal Mass Suspicious for Neoplasia on CTA Chest  Moderate to Severe Aortic Stenosis  Hx HFpEF (EF 75%, G2DD 3/2024)  Hx Pulmonary Hypertension  TTE (03/2024): EF 75%, G2DD, Mild TR, Moderate to Severe AS, Peak/mean PG 71/40, DONTE 1.1 cm ^2  ECG: ine Sinus bradycardia with 1st degree A-V block Rightward axis  Trops  Stable, BNP 1.8K    s/p Mg 2g IV  HoloSystic Murmur Herd on PE  - Trend troponin   -  Pulm consult for Bx  vs  IR   - Consider Cardiology consult then structural for TAVR as patient is very functional   - Keep Mg >2, K >4  - Aspiration Precautions   - 24 hour  Tele Monitoring     Constipation  - c/w Bowel Regimen     Hx HTN  and  HLD - Lopressor ,  Amlodipine, Lipitor     Hx  Cognitive Decline -  Donepezil  delirium precautions such as regulating sleep wake cycle, constant reorientation by staff, opening blinds during the day, out of bed to chair as tolerated, avoiding medications that can worsen delirium such as anticholinergics, antihistamines, benzodiazapines, opiods.      Hx COPD not on Home O2  - Duoneb q6h  - albuterol HFA PRN q6h   - monitor O2 on RA  - Symbicort  Spiriva    Seen on 03/07

## 2025-03-07 NOTE — ED PROVIDER NOTE - PROGRESS NOTE DETAILS
KA - Discussed results with patient and daughter.  They are agreeable to admission.  Daughter requested to be provided testing plan as she is the healthcare proxy. Trisha

## 2025-03-07 NOTE — H&P ADULT - HISTORY OF PRESENT ILLNESS
89-year-old female with past medical history of hypertension, hyperlipidemia, CHF, pulmonary hypertension, aortic stenosis, COPD not on oxygen, history of DVT not on anticoagulation presents for evaluation of left shoulder to arm pain.  She denies any trauma or injury.  She denies any fever, cough sore throat, chills, nausea, vomiting, shortness of breath, abdominal pain, urinary complaints.  DNR/DNI, daughter providing additional history.    In ED, T 97.7, /63, HR 56, RR 18, SpO2 94% on RA.   Labs: Hgb 11.9, Trop 13--> 14, Mg 1.7, Pro-BNP 1888  EKG: Sinus bradycardia, 1st degree AV block, RAD     CTA Chest: No evidence of pulmonary embolism.    Mediastinal lymphadenopathy is noted. This includes increased soft tissue   density (4.3 x 2.3 cm) in thesubcarinal region suspicious for   lymphadenopathy and/or esophageal mass. Further investigation is   recommended.  The pulmonary artery measures 3.4 cm which may be seen with pulmonary   hypertension.    s/p Mg 2g IV    Admitted for further management.    89-year-old female with past medical history of hypertension, hyperlipidemia, CHF, pulmonary hypertension, aortic stenosis, COPD not on oxygen, history of DVT not on anticoagulation presents for evaluation of left shoulder to arm pain.  She denies any trauma or injury.  She denies any fever, cough sore throat, chills, nausea, vomiting, shortness of breath, abdominal pain, urinary complaints. Patient reports that left arm pain started 2-3 weeks ago but has progressively worsened since.  Per collateral obtained from daughter, she has been a little SOB the past few days. Per ED chart, daughter reported that patient is DNR/DNI.    In ED, T 97.7, /63, HR 56, RR 18, SpO2 94% on RA.   Labs: Hgb 11.9, Trop 13--> 14, Mg 1.7, Pro-BNP 1888  EKG: Sinus bradycardia, 1st degree AV block, RAD     CTA Chest: No evidence of pulmonary embolism.    Mediastinal lymphadenopathy is noted. This includes increased soft tissue   density (4.3 x 2.3 cm) in the subcarinal region suspicious for   lymphadenopathy and/or esophageal mass. Further investigation is   recommended.The pulmonary artery measures 3.4 cm which may be seen with pulmonary   hypertension.    s/p Mg 2g IV    Admitted for further management.

## 2025-03-07 NOTE — ED PROVIDER NOTE - PHYSICAL EXAMINATION
As Follows:  CONST: Well appearing in NAD  EYES: PERRL, EOMI, Sclera and conjunctiva clear.   ENT: No nasal discharge. Oropharynx normal appearing, no erythema / exudates. Uvula midline. Airway intact.   CARD: No murmurs, rubs, or gallops; Normal rate and rhythm  RESP: BS Equal B/L, No wheezes, rhonchi or rales. No distress or accessory breathing  GI: Soft, non-tender, non-distended.  MS: Nontender left shoulder and humerus. Normal ROM in all extremities. No midline Cervical/Thoracic/Lumbar spinal tenderness.  SKIN: Warm, dry, no acute rashes. MMM  NEURO: Alert and Oriented to person/place/time, Reported dementia No focal deficits.

## 2025-03-07 NOTE — H&P ADULT - [facility] HIDDEN
----- Message from Nichol Arias sent at 9/1/2020  1:01 PM CDT -----  Contact: patient/  [phone]  Dr Chan patient requesting to be seen within 1 to 2 weeks for a Hospital Follow-up  Please advise      yes

## 2025-03-07 NOTE — ED PROVIDER NOTE - CARE PLAN
1 Principal Discharge DX:	Arm pain  Secondary Diagnosis:	Mediastinal lymphadenopathy   Principal Discharge DX:	Arm pain  Secondary Diagnosis:	Mediastinal lymphadenopathy  Secondary Diagnosis:	Abnormal EKG  Secondary Diagnosis:	Elevated brain natriuretic peptide (BNP) level

## 2025-03-07 NOTE — ED PROVIDER NOTE - CLINICAL SUMMARY MEDICAL DECISION MAKING FREE TEXT BOX
89year-old female with h/o COPD on 2 L O2, pulmonary hypertension, HTN, HLD, CHF, DVT no longer on AC, aortic stenosis, in ER for evaluation of CP.  Patient states he has been having L upper chest/L shoulder/L upper arm pain on and off for the past week.  Pain lasted a few hours at a time and then resolves.  Denies any SOB.  No fall/trauma.  No abdominal pain.  No LE pain just swelling.  No HA/dizziness/syncope.  PE - nad, nc/at, eomi, perrl, op - clear, mmm, neck supple, + normal WOB, cta b/l, no w/r/r, rrr, 3/6 kalpesh,  abd- soft, nt/nd, nabs, from x 4, no LE swelling/tenderness, 2+ radial pulses b/l, no LUE swelling, A&O x 3, cn 2-12 intact, no focal motor/sensory deficits.   -Labs reviewed: CBC/CMP unremarkable.  BNP 1888.  HST 13>14 on repeat.  EKG: SR@55, prwp. CTA chest: No PE; mediastinal lymphadenopathy; soft tissue density 4.3 x 2.3 cm and subcarinal region suspicious for lymph adenopathy and/or esophageal mass  Patient admitted for further evaluation.

## 2025-03-07 NOTE — H&P ADULT - ASSESSMENT
89-year-old female with past medical history of hypertension, hyperlipidemia, CHF, pulmonary hypertension, aortic stenosis, COPD not on oxygen, history of DVT not on anticoagulation presents for evaluation of left shoulder to arm pain.  She denies any trauma or injury. DNR/DNI, daughter providing additional history. Admitted to medicine for further management.     #Chest pain likely 2/2 Mediastinal Mass   #Subcarinal/Esophgeal Mass Suspicious for Neoplasia on CTA Chest  #Mediastinal LAD   - In ED, T 97.7, /63, HR 56, RR 18, SpO2 94% on RA.   - Labs: Hgb 11.9, Trop 13--> 14, Mg 1.7, Pro-BNP 1888  - EKG: Sinus bradycardia, 1st degree AV block, RAD   - CTA Chest: No evidence of pulmonary embolism. Mediastinal lymphadenopathy is noted. This includes increased soft tissue density (4.3 x 2.3 cm) in thesubcarinal region suspicious for lymphadenopathy and/or esophageal mass. Further investigation is recommended.The pulmonary artery measures 3.4 cm which may be seen with pulmonary hypertension.  - s/p Mg 2g IV  - Trend troponin   - Heme/Onc   - Keep Mg >2, K >4  - Aspiration Precautions       #HFpEF (EF 75%, G2DD 3/2024)  #Pulmonary Hypertension  # Aortic Stenosis  - TTE (03/2024): EF 75%, G2DD, Mild TR, Moderate to Severe AS, Peak/mean PG 71/40, DONTE 1.1 cm ^2  - c/w home meds     #HTN  #HLD   - c/w home meds     #COPD not on Home O2  - duonebs q6h  - albuterol HFA PRN q6h   - monitor O2 on RA    #H/o DVT not on AC  - monitor off AC  - consider starting AC if benefits >>> risks given hypercoagulable state if neoplasia present                                          --------------------------------------------------------------    # DVT prophylaxis: Lovenox  # GI prophylaxis: PPI  # Diet:   # Activity:   # Code status: DNR/DNI     # Disposition:    Pending:        89-year-old female with past medical history of hypertension, hyperlipidemia, CHF, pulmonary hypertension, aortic stenosis, COPD not on oxygen, history of DVT not on anticoagulation presents for evaluation of left shoulder to arm pain.  She denies any trauma or injury. DNR/DNI, daughter providing additional history. Admitted to medicine for further management.     #Chest pain likely 2/2 Mediastinal Mass   #Subcarinal/Esophgeal Mass Suspicious for Neoplasia on CTA Chest  #Mediastinal LAD   - In ED, T 97.7, /63, HR 56, RR 18, SpO2 94% on RA.   - Labs: Hgb 11.9, Trop 13--> 14, Mg 1.7, Pro-BNP 1888  - EKG: Sinus bradycardia, 1st degree AV block, RAD   - CTA Chest: No evidence of pulmonary embolism. Mediastinal lymphadenopathy is noted. This includes increased soft tissue density (4.3 x 2.3 cm) in thesubcarinal region suspicious for lymphadenopathy and/or esophageal mass. Further investigation is recommended.The pulmonary artery measures 3.4 cm which may be seen with pulmonary hypertension.  - s/p Mg 2g IV  - Trend troponin   - Heme/Onc   - Keep Mg >2, K >4  - Aspiration Precautions     #HFpEF (EF 75%, G2DD 3/2024)  #Pulmonary Hypertension  # Aortic Stenosis  - TTE (03/2024): EF 75%, G2DD, Mild TR, Moderate to Severe AS, Peak/mean PG 71/40, DONTE 1.1 cm ^2  - c/w home meds     #HTN  #HLD   - c/w home meds     #COPD not on Home O2  - duonebs q6h  - albuterol HFA PRN q6h   - monitor O2 on RA    #H/o DVT not on AC  - monitor off AC  - consider starting AC if benefits >>> risks given hypercoagulable state if neoplasia present                                          --------------------------------------------------------------    # DVT prophylaxis: Lovenox  # GI prophylaxis: PPI  # Diet: DASH/TLC   # Activity: IAT with Assistance   # Code status: DNR/DNI     # Disposition:    Pending:        89-year-old female with past medical history of hypertension, hyperlipidemia, CHF, pulmonary hypertension, aortic stenosis, COPD not on oxygen, history of DVT not on anticoagulation presents for evaluation of left shoulder to arm pain.  She denies any trauma or injury. DNR/DNI, daughter providing additional history. Admitted to medicine for further management.     #Chest pain likely 2/2 Mediastinal Mass vs. Progression of Aortic Stenosis   #Moderate to Severe Aortic Stenosis  #Subcarinal/Esophgeal Mass Suspicious for Neoplasia on CTA Chest  #Mediastinal LAD   - In ED, T 97.7, /63, HR 56, RR 18, SpO2 94% on RA.   - Labs: Hgb 11.9, Trop 13--> 14, Mg 1.7, Pro-BNP 1888  - EKG: Sinus bradycardia, 1st degree AV block, RAD   - CTA Chest: No evidence of pulmonary embolism. Mediastinal lymphadenopathy is noted. This includes increased soft tissue density (4.3 x 2.3 cm) in the subcarinal region suspicious for lymphadenopathy and/or esophageal mass. Further investigation is recommended. The pulmonary artery measures 3.4 cm which may be seen with pulmonary hypertension.  - TTE (03/2024): EF 75%, G2DD, Mild TR, Moderate to Severe AS, Peak/mean PG 71/40, DONTE 1.1 cm ^2  - s/p Mg 2g IV  - Trend troponin   - Heme/Onc   - Consider Cardiology consult then structural for TAVR as patient is very functional   - Keep Mg >2, K >4  - Aspiration Precautions     #HFpEF (EF 75%, G2DD 3/2024)  #Pulmonary Hypertension  #Moderate to Severe Aortic Stenosis  - TTE (03/2024): EF 75%, G2DD, Mild TR, Moderate to Severe AS, Peak/mean PG 71/40, DONTE 1.1 cm ^2  - c/w home meds     #HTN  #HLD   - c/w home meds     #COPD not on Home O2  - duonebs q6h  - albuterol HFA PRN q6h   - monitor O2 on RA    #H/o DVT not on AC  - monitor off AC  - consider starting AC if benefits >>> risks given hypercoagulable state if neoplasia present                                          --------------------------------------------------------------    # DVT prophylaxis: Lovenox  # GI prophylaxis: PPI  # Diet: DASH/TLC   # Activity: IAT with Assistance   # Code status: DNR/DNI     # Disposition:    Pending:

## 2025-03-07 NOTE — H&P ADULT - NSHPPHYSICALEXAM_GEN_ALL_CORE
T(C): 36.5 (03-07-25 @ 16:31), Max: 36.5 (03-07-25 @ 16:31)  HR: 72 (03-07-25 @ 22:41) (56 - 72)  BP: 165/72 (03-07-25 @ 22:41) (165/72 - 186/63)  RR: 18 (03-07-25 @ 22:41) (18 - 18)  SpO2: 98% (03-07-25 @ 22:41) (94% - 98%)    CONSTITUTIONAL: NAD  HEENT: NC/AT, EOMI  NECK: Supple, symmetric and without tracheal deviation   RESP: No respiratory distress, no use of accessory muscles. B/l air entry. No adventitious breath sounds  CARDIOVASCULAR: RRR, +S1S2  EXTREMITIES: No pedal edema b/l  GI: BS (+), Soft, NT, ND, no rebound, no guarding  MSK: Normal muscle strength/tone in UE & LE b/l  NEURO: Sensation intact in upper and lower extremities b/l to light touch   PSYCH: A&Ox3. Appropriate insight & judgement

## 2025-03-07 NOTE — ED PROVIDER NOTE - OBJECTIVE STATEMENT
Patient is an 89-year-old female with past medical history of hypertension, hyperlipidemia, CHF, pulmonary hypertension, aortic stenosis, COPD not on oxygen, history of DVT not on anticoagulation presents for evaluation of left shoulder to arm pain.  She denies any trauma or injury.  She denies any fever, cough sore throat, chills, nausea, vomiting, shortness of breath, abdominal pain, urinary complaints.  DNR/DNI, daughter providing additional history.

## 2025-03-08 ENCOUNTER — RESULT REVIEW (OUTPATIENT)
Age: 89
End: 2025-03-08

## 2025-03-08 LAB
ANION GAP SERPL CALC-SCNC: 10 MMOL/L — SIGNIFICANT CHANGE UP (ref 7–14)
BASOPHILS # BLD AUTO: 0.02 K/UL — SIGNIFICANT CHANGE UP (ref 0–0.2)
BASOPHILS NFR BLD AUTO: 0.3 % — SIGNIFICANT CHANGE UP (ref 0–1)
BUN SERPL-MCNC: 10 MG/DL — SIGNIFICANT CHANGE UP (ref 10–20)
CALCIUM SERPL-MCNC: 8.3 MG/DL — LOW (ref 8.4–10.5)
CEA SERPL-MCNC: 1 NG/ML — SIGNIFICANT CHANGE UP (ref 0–3.8)
CHLORIDE SERPL-SCNC: 103 MMOL/L — SIGNIFICANT CHANGE UP (ref 98–110)
CO2 SERPL-SCNC: 27 MMOL/L — SIGNIFICANT CHANGE UP (ref 17–32)
CREAT SERPL-MCNC: 0.6 MG/DL — LOW (ref 0.7–1.5)
EGFR: 86 ML/MIN/1.73M2 — SIGNIFICANT CHANGE UP
EGFR: 86 ML/MIN/1.73M2 — SIGNIFICANT CHANGE UP
EOSINOPHIL # BLD AUTO: 0.03 K/UL — SIGNIFICANT CHANGE UP (ref 0–0.7)
EOSINOPHIL NFR BLD AUTO: 0.4 % — SIGNIFICANT CHANGE UP (ref 0–8)
GLUCOSE SERPL-MCNC: 88 MG/DL — SIGNIFICANT CHANGE UP (ref 70–99)
HCT VFR BLD CALC: 35.8 % — LOW (ref 37–47)
HGB BLD-MCNC: 11.5 G/DL — LOW (ref 12–16)
IMM GRANULOCYTES NFR BLD AUTO: 0.4 % — HIGH (ref 0.1–0.3)
LYMPHOCYTES # BLD AUTO: 1.56 K/UL — SIGNIFICANT CHANGE UP (ref 1.2–3.4)
LYMPHOCYTES # BLD AUTO: 20.7 % — SIGNIFICANT CHANGE UP (ref 20.5–51.1)
MAGNESIUM SERPL-MCNC: 2 MG/DL — SIGNIFICANT CHANGE UP (ref 1.8–2.4)
MCHC RBC-ENTMCNC: 28.6 PG — SIGNIFICANT CHANGE UP (ref 27–31)
MCHC RBC-ENTMCNC: 32.1 G/DL — SIGNIFICANT CHANGE UP (ref 32–37)
MCV RBC AUTO: 89.1 FL — SIGNIFICANT CHANGE UP (ref 81–99)
MONOCYTES # BLD AUTO: 1.03 K/UL — HIGH (ref 0.1–0.6)
MONOCYTES NFR BLD AUTO: 13.6 % — HIGH (ref 1.7–9.3)
NEUTROPHILS # BLD AUTO: 4.88 K/UL — SIGNIFICANT CHANGE UP (ref 1.4–6.5)
NEUTROPHILS NFR BLD AUTO: 64.6 % — SIGNIFICANT CHANGE UP (ref 42.2–75.2)
NRBC BLD AUTO-RTO: 0 /100 WBCS — SIGNIFICANT CHANGE UP (ref 0–0)
PLATELET # BLD AUTO: 179 K/UL — SIGNIFICANT CHANGE UP (ref 130–400)
PMV BLD: 9.9 FL — SIGNIFICANT CHANGE UP (ref 7.4–10.4)
POTASSIUM SERPL-MCNC: 3.8 MMOL/L — SIGNIFICANT CHANGE UP (ref 3.5–5)
POTASSIUM SERPL-SCNC: 3.8 MMOL/L — SIGNIFICANT CHANGE UP (ref 3.5–5)
RBC # BLD: 4.02 M/UL — LOW (ref 4.2–5.4)
RBC # FLD: 16.6 % — HIGH (ref 11.5–14.5)
SODIUM SERPL-SCNC: 140 MMOL/L — SIGNIFICANT CHANGE UP (ref 135–146)
TROPONIN T, HIGH SENSITIVITY RESULT: 18 NG/L — HIGH (ref 6–13)
WBC # BLD: 7.55 K/UL — SIGNIFICANT CHANGE UP (ref 4.8–10.8)
WBC # FLD AUTO: 7.55 K/UL — SIGNIFICANT CHANGE UP (ref 4.8–10.8)

## 2025-03-08 PROCEDURE — 99233 SBSQ HOSP IP/OBS HIGH 50: CPT

## 2025-03-08 PROCEDURE — 74177 CT ABD & PELVIS W/CONTRAST: CPT | Mod: 26

## 2025-03-08 PROCEDURE — 93306 TTE W/DOPPLER COMPLETE: CPT | Mod: 26

## 2025-03-08 PROCEDURE — 99222 1ST HOSP IP/OBS MODERATE 55: CPT

## 2025-03-08 RX ORDER — FUROSEMIDE 10 MG/ML
20 INJECTION INTRAMUSCULAR; INTRAVENOUS DAILY
Refills: 0 | Status: DISCONTINUED | OUTPATIENT
Start: 2025-03-08 | End: 2025-03-10

## 2025-03-08 RX ORDER — SENNA 187 MG
2 TABLET ORAL AT BEDTIME
Refills: 0 | Status: DISCONTINUED | OUTPATIENT
Start: 2025-03-08 | End: 2025-03-11

## 2025-03-08 RX ORDER — TRAZODONE HCL 100 MG
150 TABLET ORAL AT BEDTIME
Refills: 0 | Status: DISCONTINUED | OUTPATIENT
Start: 2025-03-08 | End: 2025-03-11

## 2025-03-08 RX ORDER — DONEPEZIL HYDROCHLORIDE 5 MG/1
10 TABLET ORAL AT BEDTIME
Refills: 0 | Status: DISCONTINUED | OUTPATIENT
Start: 2025-03-08 | End: 2025-03-11

## 2025-03-08 RX ORDER — AMLODIPINE BESYLATE 10 MG/1
5 TABLET ORAL AT BEDTIME
Refills: 0 | Status: DISCONTINUED | OUTPATIENT
Start: 2025-03-08 | End: 2025-03-11

## 2025-03-08 RX ORDER — ENOXAPARIN SODIUM 100 MG/ML
40 INJECTION SUBCUTANEOUS EVERY 24 HOURS
Refills: 0 | Status: DISCONTINUED | OUTPATIENT
Start: 2025-03-08 | End: 2025-03-11

## 2025-03-08 RX ORDER — METOPROLOL SUCCINATE 50 MG/1
25 TABLET, EXTENDED RELEASE ORAL
Refills: 0 | Status: DISCONTINUED | OUTPATIENT
Start: 2025-03-08 | End: 2025-03-11

## 2025-03-08 RX ORDER — ATORVASTATIN CALCIUM 80 MG/1
40 TABLET, FILM COATED ORAL AT BEDTIME
Refills: 0 | Status: DISCONTINUED | OUTPATIENT
Start: 2025-03-08 | End: 2025-03-11

## 2025-03-08 RX ORDER — POLYETHYLENE GLYCOL 3350 17 G/17G
17 POWDER, FOR SOLUTION ORAL
Refills: 0 | Status: DISCONTINUED | OUTPATIENT
Start: 2025-03-08 | End: 2025-03-11

## 2025-03-08 RX ORDER — IPRATROPIUM BROMIDE AND ALBUTEROL SULFATE .5; 2.5 MG/3ML; MG/3ML
3 SOLUTION RESPIRATORY (INHALATION) EVERY 6 HOURS
Refills: 0 | Status: DISCONTINUED | OUTPATIENT
Start: 2025-03-08 | End: 2025-03-11

## 2025-03-08 RX ADMIN — AMLODIPINE BESYLATE 5 MILLIGRAM(S): 10 TABLET ORAL at 21:03

## 2025-03-08 RX ADMIN — DONEPEZIL HYDROCHLORIDE 10 MILLIGRAM(S): 5 TABLET ORAL at 21:03

## 2025-03-08 RX ADMIN — ATORVASTATIN CALCIUM 40 MILLIGRAM(S): 80 TABLET, FILM COATED ORAL at 21:03

## 2025-03-08 RX ADMIN — Medication 150 MILLIGRAM(S): at 21:03

## 2025-03-08 RX ADMIN — METOPROLOL SUCCINATE 25 MILLIGRAM(S): 50 TABLET, EXTENDED RELEASE ORAL at 17:25

## 2025-03-08 RX ADMIN — METOPROLOL SUCCINATE 25 MILLIGRAM(S): 50 TABLET, EXTENDED RELEASE ORAL at 05:09

## 2025-03-08 RX ADMIN — IPRATROPIUM BROMIDE AND ALBUTEROL SULFATE 3 MILLILITER(S): .5; 2.5 SOLUTION RESPIRATORY (INHALATION) at 13:44

## 2025-03-08 RX ADMIN — FUROSEMIDE 20 MILLIGRAM(S): 10 INJECTION INTRAMUSCULAR; INTRAVENOUS at 16:29

## 2025-03-08 RX ADMIN — POLYETHYLENE GLYCOL 3350 17 GRAM(S): 17 POWDER, FOR SOLUTION ORAL at 05:09

## 2025-03-08 NOTE — PROGRESS NOTE ADULT - RESPIRATORY
decreased breath sounds at the bases/normal/no wheezes/no rales/no rhonchi decreased breath sounds at the bases crackles b/l/normal/no wheezes/no rales/no rhonchi

## 2025-03-08 NOTE — PHYSICAL THERAPY INITIAL EVALUATION ADULT - GENERAL OBSERVATIONS, REHAB EVAL
Patient encountered lying in bed. On O2 via NC 2LPM, O2 sat 96% at rest, 93% with activity. Agreed to participate in therapy.

## 2025-03-08 NOTE — PHYSICAL THERAPY INITIAL EVALUATION ADULT - PERTINENT HX OF CURRENT PROBLEM, REHAB EVAL
89-year-old female with past medical history of hypertension, hyperlipidemia, CHF, pulmonary hypertension, aortic stenosis, COPD not on oxygen, history of DVT not on anticoagulation presents for evaluation of left shoulder to arm pain.  She denies any trauma or injury.  She denies any fever, cough sore throat, chills, nausea, vomiting, shortness of breath, abdominal pain, urinary complaints. Patient reports that left arm pain started 2-3 weeks ago but has progressively worsened since.  Per collateral obtained from daughter, she has been a little SOB the past few days. Per ED chart, daughter reported that patient is DNR/DNI.

## 2025-03-08 NOTE — PROGRESS NOTE ADULT - ASSESSMENT
89-year-old female with past medical history of hypertension, hyperlipidemia, CHF, pulmonary hypertension, aortic stenosis, COPD not on oxygen, history of DVT not on anticoagulation presents for evaluation of left shoulder to arm pain.  She denies any trauma or injury. DNR/DNI, daughter providing additional history. Admitted to medicine for further management.     Atypical Chest Pain likely due to the Mediastinal Mass  Mediastinal Mass : Subcarinal/Esophgeal Mass Suspicious for Neoplasia on CTA Chest  Moderate to Severe Aortic Stenosis  Hx HFpEF (EF 75%, G2DD 3/2024)  Hx Pulmonary Hypertension  TTE (03/2024): EF 75%, G2DD, Mild TR, Moderate to Severe AS, Peak/mean PG 71/40, DONTE 1.1 cm ^2  ECG: ine Sinus bradycardia with 1st degree A-V block Rightward axis  Trops  Stable, BNP 1.8K   - Trend troponin   - advanced GI consult   - FU repeat echo- CT chest showing pleural effusions and trivial pericardial effusions. would hold off on diuresis as seems asymptomatic until repeat echo as AS maybe preload dependant.   - hemeonc consult requesting biopsy   - send esr and crp - ekg no ST elevations.   - Keep Mg >2, K >4  - Aspiration Precautions     Constipation  - c/w Bowel Regimen     Hx HTN  and  HLD - Lopressor ,  Amlodipine, Lipitor     Hx  Cognitive Decline -  Donepezil  delirium precautions such as regulating sleep wake cycle, constant reorientation by staff, opening blinds during the day, out of bed to chair as tolerated, avoiding medications that can worsen delirium such as anticholinergics, antihistamines, benzodiazapines, opiods.      Hx COPD not on Home O2  - Duoneb q6h  - albuterol HFA PRN q6h   - monitor O2 on RA  - Symbicort  Spiriva      Time-based billing (NON-critical care).     55 minutes spent on total encounter. The necessity of the time spent during the encounter on this date of service was due to:     Patient seen at bedside time spent evaluating and treating the patient's acute illness as well as time spent reviewing labs, radiology, discussing with patient and/or patient's family, and discussing the case with a multidisciplinary team. 89-year-old female with past medical history of hypertension, hyperlipidemia, CHF, pulmonary hypertension, aortic stenosis, COPD not on oxygen, history of DVT not on anticoagulation presents for evaluation of left shoulder to arm pain.  She denies any trauma or injury. DNR/DNI, daughter providing additional history. Admitted to medicine for further management.     Atypical Chest Pain likely due to the Mediastinal Mass vs HfpEF   HFpEF (EF 75%, G2DD 3/2024) exacerbation   AHRF  Severe Pulmonary Hypertension- maybe due to HFpEF    Mediastinal Mass : Subcarinal/Esophgeal Mass Suspicious for Neoplasia on CTA Chest  Moderate Aortic Stenosis  TTE (03/2024): EF 75%, G2DD, Mild TR, Moderate to Severe AS, Peak/mean PG 71/40, DONTE 1.1 cm ^2  ECG: ine Sinus bradycardia with 1st degree A-V block Rightward axis  Trops  Stable, BNP 1.8K   - Trend troponin   - advanced GI consult   - FU repeat echo- CT chest showing pleural effusions and trivial pericardial effusions.   - start on Iv Lasix 40 mg - on 3 L nc satting 85% on RA.   - hemeonc consult requesting biopsy   - send esr and crp - ekg no ST elevations.   - Keep Mg >2, K >4    Constipation  - c/w Bowel Regimen     Hx HTN  and  HLD - Lopressor ,  Amlodipine, Lipitor     Hx  Cognitive Decline -  Donepezil  delirium precautions such as regulating sleep wake cycle, constant reorientation by staff, opening blinds during the day, out of bed to chair as tolerated, avoiding medications that can worsen delirium such as anticholinergics, antihistamines, benzodiazapines, opiods.  patient has short term memory forgetfulness. wonders at night. may need 1 to 1 for safety.     Reported history COPD but not officially diagnosed.   - Duoneb q6h  - albuterol HFA PRN q6h   - monitor O2 on RA  - Symbicort  Spiriva  - patient did not officially get PFT outpatient.     Time-based billing (NON-critical care).     55 minutes spent on total encounter. The necessity of the time spent during the encounter on this date of service was due to:     Patient seen at bedside time spent evaluating and treating the patient's acute illness as well as time spent reviewing labs, radiology, discussing with patient and/or patient's family, and discussing the case with a multidisciplinary team.

## 2025-03-08 NOTE — PATIENT PROFILE ADULT - FALL HARM RISK - HARM RISK INTERVENTIONS

## 2025-03-08 NOTE — PROGRESS NOTE ADULT - CARDIOVASCULAR
normal/regular rate and rhythm/S1 S2 present/no gallops/no rub/no murmur systolic murmur JVD+/normal/regular rate and rhythm/S1 S2 present/no gallops/no rub

## 2025-03-08 NOTE — CONSULT NOTE ADULT - ATTENDING COMMENTS
We were asked to see the patient because of incidental approximately 4 x 2 cm subcarinal region neck adenopathy.  Patient's daughter was at bedside I reviewed the CT chest.  Her CBC did not show any leukocytosis therefore flow cytometry peripheral would be unlikely so therefore recommended a biopsy which may possibly be done with advanced GI with EUS.  I explained the rationale of the biopsy the patient was not sure but then we were told that they agreed so please have advanced GI see the patient if they are unable to do this I will have to consult pulmonary for an EBUS.    We could consider PET scan or CT abdomen and pelvis as well.  If for some reason an EUS is not posible then we could check a CT abdomen and pelvis to see if there is any other area that IR can biopsy.  Peripherally there was no adenopathy on exam

## 2025-03-08 NOTE — CONSULT NOTE ADULT - SUBJECTIVE AND OBJECTIVE BOX
Oncology Consult Note    HPI:  89-year-old female with past medical history of hypertension, hyperlipidemia, CHF, pulmonary hypertension, aortic stenosis, COPD not on oxygen, history of DVT not on anticoagulation presents for evaluation of left shoulder to arm pain.  She denies any trauma or injury.  She denies any fever, cough sore throat, chills, nausea, vomiting, shortness of breath, abdominal pain, urinary complaints. Patient reports that left arm pain started 2-3 weeks ago but has progressively worsened since.  Per collateral obtained from daughter, she has been a little SOB the past few days. Per ED chart, daughter reported that patient is DNR/DNI.    In ED, T 97.7, /63, HR 56, RR 18, SpO2 94% on RA.   Labs: Hgb 11.9, Trop 13--> 14, Mg 1.7, Pro-BNP 1888  EKG: Sinus bradycardia, 1st degree AV block, RAD     CTA Chest: No evidence of pulmonary embolism.    Mediastinal lymphadenopathy is noted. This includes increased soft tissue   density (4.3 x 2.3 cm) in the subcarinal region suspicious for   lymphadenopathy and/or esophageal mass. Further investigation is   recommended.The pulmonary artery measures 3.4 cm which may be seen with pulmonary   hypertension.    s/p Mg 2g IV    Admitted for further management.    (07 Mar 2025 23:35)      Allergies    No Known Allergies    Intolerances        MEDICATIONS  (STANDING):  amLODIPine   Tablet 5 milliGRAM(s) Oral at bedtime  atorvastatin 40 milliGRAM(s) Oral at bedtime  donepezil 10 milliGRAM(s) Oral at bedtime  enoxaparin Injectable 40 milliGRAM(s) SubCutaneous every 24 hours  metoprolol tartrate 25 milliGRAM(s) Oral two times a day  polyethylene glycol 3350 17 Gram(s) Oral two times a day  senna 2 Tablet(s) Oral at bedtime  traZODone 150 milliGRAM(s) Oral at bedtime    MEDICATIONS  (PRN):      PAST MEDICAL & SURGICAL HISTORY:  HTN (hypertension)      Hypercholesteremia      Heavy cigarette smoker      History of laparoscopic cholecystectomy      H/O: hysterectomy      History of surgery  LEFT EYE CATARACT EXTRACTION WITH LENS  IMPLANT          FAMILY HISTORY:      SOCIAL HISTORY: No EtOH, no tobacco    REVIEW OF SYSTEMS:    CONSTITUTIONAL: No weakness, fevers or chills  EYES/ENT: No visual changes;  No vertigo or throat pain   NECK: No pain or stiffness  RESPIRATORY: No cough, wheezing, hemoptysis; No shortness of breath  CARDIOVASCULAR: No chest pain or palpitations  GASTROINTESTINAL: No abdominal or epigastric pain. No nausea, vomiting, or hematemesis; No diarrhea or constipation. No melena or hematochezia.  GENITOURINARY: No dysuria, frequency or hematuria  NEUROLOGICAL: No numbness or weakness  SKIN: No itching, burning, rashes, or lesions   All other review of systems is negative unless indicated above.    Height (cm): 172.7 (03-07 @ 16:31)  Weight (kg): 64.4 (03-07 @ 16:31)  BMI (kg/m2): 21.6 (03-07 @ 16:31)  BSA (m2): 1.77 (03-07 @ 16:31)    T(F): 98.1 (03-08-25 @ 07:45), Max: 98.1 (03-08-25 @ 05:10)  HR: 60 (03-08-25 @ 07:45)  BP: 140/76 (03-08-25 @ 07:45)  RR: 18 (03-08-25 @ 07:45)  SpO2: 94% (03-08-25 @ 07:45)  Wt(kg): --    GENERAL: NAD, well-developed  HEAD:  Atraumatic, Normocephalic  EYES: EOMI, PERRLA, conjunctiva and sclera clear  NECK: Supple, No JVD  CHEST/LUNG: Clear to auscultation bilaterally; No wheeze  HEART: Regular rate and rhythm; No murmurs, rubs, or gallops  ABDOMEN: Soft, Nontender, Nondistended; Bowel sounds present  EXTREMITIES:  2+ Peripheral Pulses, No clubbing, cyanosis, or edema  NEUROLOGY: non-focal  SKIN: No rashes or lesions                          11.5   7.55  )-----------( 179      ( 08 Mar 2025 07:03 )             35.8       03-08    140  |  103  |  10  ----------------------------<  88  3.8   |  27  |  0.6[L]    Ca    8.3[L]      08 Mar 2025 07:03  Mg     2.0     03-08    TPro  6.3  /  Alb  4.2  /  TBili  0.6  /  DBili  x   /  AST  11  /  ALT  9   /  AlkPhos  71  03-07      Magnesium: 2.0 mg/dL (03-08 @ 07:03)  Magnesium: 1.7 mg/dL (03-07 @ 18:19)      < from: CT Angio Chest PE Protocol w/ IV Cont (03.07.25 @ 18:54) >  Increased soft tissue density (4.3 x 2.3 cm) is   noted in the subcarinal region suspicious for lymphadenopathy and/or   esophageal mass. Pretracheal lymph node measuring 1.6 x 1.6 cm and   several para-aortic lymph nodes are noted as well    < end of copied text >     Oncology Consult Note    HPI:  88 y/o F PMHx HTN, HLD, CHF, pulmonary hypertension, aortic stenosis, COPD not on oxygen, history of DVT not on anticoagulation presented for evaluation of left shoulder to arm pain x 2-3 weeks, SOB.  CTA showed mediastinal lymphadenopathy versus esophageal mass. Patient denies any dysphagia or odynophagia, no recent weight loss. She also denied any fevers, chills, night sweats       (07 Mar 2025 23:35)      Allergies    No Known Allergies    Intolerances        MEDICATIONS  (STANDING):  amLODIPine   Tablet 5 milliGRAM(s) Oral at bedtime  atorvastatin 40 milliGRAM(s) Oral at bedtime  donepezil 10 milliGRAM(s) Oral at bedtime  enoxaparin Injectable 40 milliGRAM(s) SubCutaneous every 24 hours  metoprolol tartrate 25 milliGRAM(s) Oral two times a day  polyethylene glycol 3350 17 Gram(s) Oral two times a day  senna 2 Tablet(s) Oral at bedtime  traZODone 150 milliGRAM(s) Oral at bedtime    MEDICATIONS  (PRN):      PAST MEDICAL & SURGICAL HISTORY:  HTN (hypertension)      Hypercholesteremia      Heavy cigarette smoker      History of laparoscopic cholecystectomy      H/O: hysterectomy      History of surgery  LEFT EYE CATARACT EXTRACTION WITH LENS  IMPLANT          FAMILY HISTORY:      SOCIAL HISTORY: No EtOH, no tobacco    REVIEW OF SYSTEMS:    CONSTITUTIONAL: No weakness, fevers or chills  EYES/ENT: No visual changes;  No vertigo or throat pain   NECK: No pain or stiffness  RESPIRATORY: No cough, wheezing, hemoptysis; No shortness of breath  CARDIOVASCULAR: No chest pain or palpitations  GASTROINTESTINAL: No abdominal or epigastric pain. No nausea, vomiting, or hematemesis; No diarrhea or constipation. No melena or hematochezia.  GENITOURINARY: No dysuria, frequency or hematuria  NEUROLOGICAL: No numbness or weakness  SKIN: No itching, burning, rashes, or lesions   All other review of systems is negative unless indicated above.    Height (cm): 172.7 (03-07 @ 16:31)  Weight (kg): 64.4 (03-07 @ 16:31)  BMI (kg/m2): 21.6 (03-07 @ 16:31)  BSA (m2): 1.77 (03-07 @ 16:31)    T(F): 98.1 (03-08-25 @ 07:45), Max: 98.1 (03-08-25 @ 05:10)  HR: 60 (03-08-25 @ 07:45)  BP: 140/76 (03-08-25 @ 07:45)  RR: 18 (03-08-25 @ 07:45)  SpO2: 94% (03-08-25 @ 07:45)  Wt(kg): --    GENERAL: NAD, well-developed  HEAD:  Atraumatic, Normocephalic  EYES: EOMI, PERRLA, conjunctiva and sclera clear  NECK: Supple, No JVD  CHEST/LUNG: Clear to auscultation bilaterally; No wheeze  HEART: Regular rate and rhythm; No murmurs, rubs, or gallops  ABDOMEN: Soft, Nontender, Nondistended; Bowel sounds present  EXTREMITIES:  2+ Peripheral Pulses, No clubbing, cyanosis, or edema  NEUROLOGY: non-focal  SKIN: No rashes or lesions                          11.5   7.55  )-----------( 179      ( 08 Mar 2025 07:03 )             35.8       03-08    140  |  103  |  10  ----------------------------<  88  3.8   |  27  |  0.6[L]    Ca    8.3[L]      08 Mar 2025 07:03  Mg     2.0     03-08    TPro  6.3  /  Alb  4.2  /  TBili  0.6  /  DBili  x   /  AST  11  /  ALT  9   /  AlkPhos  71  03-07      Magnesium: 2.0 mg/dL (03-08 @ 07:03)  Magnesium: 1.7 mg/dL (03-07 @ 18:19)      < from: CT Angio Chest PE Protocol w/ IV Cont (03.07.25 @ 18:54) >  Increased soft tissue density (4.3 x 2.3 cm) is   noted in the subcarinal region suspicious for lymphadenopathy and/or   esophageal mass. Pretracheal lymph node measuring 1.6 x 1.6 cm and   several para-aortic lymph nodes are noted as well    < end of copied text >

## 2025-03-08 NOTE — PHYSICAL THERAPY INITIAL EVALUATION ADULT - ADDITIONAL COMMENTS
Patient lives with daughter. Has HHA due to dementia. Patient has 3 steps to eneter and 12 steps to living floor (+) chair lift. Was supervised in ADLS and ambulation no AD in home and RW in community.

## 2025-03-08 NOTE — CONSULT NOTE ADULT - ASSESSMENT
88 y/o F PMHx HTN, HLD, CHF, pulmonary hypertension, aortic stenosis, COPD not on oxygen, history of DVT not on anticoagulation presented for evaluation of left shoulder to arm pain x 2-3 weeks, SOB.      # Mediastinal lymphadenopathy  # Chronic normocytic anemia (3.2024) 88 y/o F PMHx HTN, HLD, CHF, pulmonary hypertension, aortic stenosis, COPD not on oxygen, history of DVT not on anticoagulation presented for evaluation of left shoulder to arm pain x 2-3 weeks, SOB.      # Mediastinal lymphadenopathy  # Chronic normocytic anemia (3.2024)  - Mediastinal mass, adenopathy versus esophageal mass, no reported dysphagia, odynophagia, fevers, chills, night sweats  - CBC normal aside from mild chronic normocytic anemia  - Noted enlarged paraaortic lymph nodes    Plan:  Mediastinal mass, differential is broad including lung cancer, esophageal cancer, lymphoma. Please consult advanced GI for possible EGD/EUS and biopsy.    Please obtain dedicated CT abdomen pelvis with IV contrast  Will follow up once pathology results available

## 2025-03-09 PROCEDURE — 99232 SBSQ HOSP IP/OBS MODERATE 35: CPT

## 2025-03-09 PROCEDURE — 93970 EXTREMITY STUDY: CPT | Mod: 26

## 2025-03-09 PROCEDURE — 99223 1ST HOSP IP/OBS HIGH 75: CPT

## 2025-03-09 RX ORDER — ACETAMINOPHEN 500 MG/5ML
1000 LIQUID (ML) ORAL ONCE
Refills: 0 | Status: COMPLETED | OUTPATIENT
Start: 2025-03-09 | End: 2025-03-09

## 2025-03-09 RX ADMIN — Medication 1000 MILLIGRAM(S): at 16:57

## 2025-03-09 RX ADMIN — Medication 150 MILLIGRAM(S): at 21:18

## 2025-03-09 RX ADMIN — IPRATROPIUM BROMIDE AND ALBUTEROL SULFATE 3 MILLILITER(S): .5; 2.5 SOLUTION RESPIRATORY (INHALATION) at 13:07

## 2025-03-09 RX ADMIN — AMLODIPINE BESYLATE 5 MILLIGRAM(S): 10 TABLET ORAL at 21:18

## 2025-03-09 RX ADMIN — FUROSEMIDE 20 MILLIGRAM(S): 10 INJECTION INTRAMUSCULAR; INTRAVENOUS at 06:49

## 2025-03-09 RX ADMIN — ATORVASTATIN CALCIUM 40 MILLIGRAM(S): 80 TABLET, FILM COATED ORAL at 21:18

## 2025-03-09 RX ADMIN — Medication 400 MILLIGRAM(S): at 15:33

## 2025-03-09 RX ADMIN — IPRATROPIUM BROMIDE AND ALBUTEROL SULFATE 3 MILLILITER(S): .5; 2.5 SOLUTION RESPIRATORY (INHALATION) at 19:51

## 2025-03-09 RX ADMIN — IPRATROPIUM BROMIDE AND ALBUTEROL SULFATE 3 MILLILITER(S): .5; 2.5 SOLUTION RESPIRATORY (INHALATION) at 07:56

## 2025-03-09 RX ADMIN — DONEPEZIL HYDROCHLORIDE 10 MILLIGRAM(S): 5 TABLET ORAL at 21:18

## 2025-03-09 RX ADMIN — Medication 2 TABLET(S): at 21:18

## 2025-03-09 RX ADMIN — ENOXAPARIN SODIUM 40 MILLIGRAM(S): 100 INJECTION SUBCUTANEOUS at 07:56

## 2025-03-09 NOTE — CONSULT NOTE ADULT - SUBJECTIVE AND OBJECTIVE BOX
Gastroenterology Consultation:    Patient is a 89y old  Female who presents with a chief complaint of       Admitted on: 03-07-25      HPI:  89-year-old female with past medical history of hypertension, hyperlipidemia, CHFpEF, severe pulmonary hypertension, mod  aortic stenosis, COPD not on oxygen, history of DVT not on anticoagulation presents for evaluation of left shoulder to arm pain.  [facility] revealed [facility]. [facility] consulted for possible EUS guided biopsy    Patient denies any abdominal pain, nausea, vomiting, unintentionasl weight loss, swallowing difficulty, blood in stool.  No recent travel, antiobtic use, SSRI use.  Prior EGD: never had any    Prior Colonoscopy:never had any      PAST MEDICAL & SURGICAL HISTORY:  HTN (hypertension)      Hypercholesteremia      Heavy cigarette smoker      History of laparoscopic cholecystectomy      H/O: hysterectomy      History of surgery  LEFT EYE CATARACT EXTRACTION WITH LENS  IMPLANT            FAMILY HISTORY:  NO GI related malignancies/disorders/IBD    Social History:  Tobacco: former smoker, quit 3 years ago  Alcohol: denies  Drugs:denies    Home Medications:  amLODIPine 5 mg oral tablet: 1.5 tab(s) orally once a day (at bedtime) (07 Apr 2024 23:20)  Breztri Aerosphere 160 mcg-9 mcg-4.8 mcg/inh inhalation aerosol: 1 puff(s) inhaled 2 times a day (08 Mar 2025 00:42)  donepezil 10 mg oral tablet: 1 tab(s) orally once a day (06 Mar 2024 06:57)  Lipitor 40 mg oral tablet: 1 tab(s) orally once a day (27 May 2022 08:49)  metoprolol: 25 milligram(s) orally 2 times a day (08 Mar 2025 00:39)  polyethylene glycol 3350 oral powder for reconstitution: 17 gram(s) orally 2 times a day (11 Mar 2024 11:23)  Retaine MGD ophthalmic emulsion: 1 application to each affected eye once a day (at bedtime) (08 Mar 2025 00:43)  senna leaf extract oral tablet: 2 tab(s) orally once a day (at bedtime) (11 Mar 2024 11:23)  traZODone 150 mg oral tablet: 0.5 orally once a day (at bedtime) (07 Apr 2024 23:23)        MEDICATIONS  (STANDING):  albuterol/ipratropium for Nebulization 3 milliLiter(s) Nebulizer every 6 hours  amLODIPine   Tablet 5 milliGRAM(s) Oral at bedtime  atorvastatin 40 milliGRAM(s) Oral at bedtime  donepezil 10 milliGRAM(s) Oral at bedtime  enoxaparin Injectable 40 milliGRAM(s) SubCutaneous every 24 hours  furosemide   Injectable 20 milliGRAM(s) IV Push daily  metoprolol tartrate 25 milliGRAM(s) Oral two times a day  polyethylene glycol 3350 17 Gram(s) Oral two times a day  senna 2 Tablet(s) Oral at bedtime  traZODone 150 milliGRAM(s) Oral at bedtime    MEDICATIONS  (PRN):      Allergies  No Known Allergies      Review of Systems:   Constitutional:  No Fever, No Chills  ENT/Mouth:  No Hearing Changes,  No Difficulty Swallowing  Eyes:  No Eye Pain, No Vision Changes  Cardiovascular:  No Chest Pain, No Palpitations  Respiratory:  No Cough, No Dyspnea  Gastrointestinal:  As described in HPI          Physical Examination:  T(C): 36.4 (03-09-25 @ 08:00), Max: 36.7 (03-08-25 @ 16:19)  HR: 60 (03-09-25 @ 08:00) (53 - 67)  BP: 164/53 (03-09-25 @ 08:00) (138/63 - 182/68)  RR: 18 (03-09-25 @ 08:00) (18 - 19)  SpO2: 97% (03-09-25 @ 08:00) (83% - 98%)          GENERAL: AAOx3, no acute distress.  HEAD:  Atraumatic, Normocephalic  EYES: conjunctiva and sclera clear  CHEST/LUNG: dec BS  HEART: Regular rate and rhythm; normal S1, S2.  ABDOMEN: Soft, nontender, nondistended; Bowel sounds present  SKIN: Intact, no jaundice        Data:                        11.5   7.55  )-----------( 179      ( 08 Mar 2025 07:03 )             35.8     Hgb Trend:  11.5  03-08-25 @ 07:03  11.9  03-07-25 @ 18:18      03-08    140  |  103  |  10  ----------------------------<  88  3.8   |  27  |  0.6[L]    Ca    8.3[L]      08 Mar 2025 07:03  Mg     2.0     03-08    TPro  6.3  /  Alb  4.2  /  TBili  0.6  /  DBili  x   /  AST  11  /  ALT  9   /  AlkPhos  71  03-07    Liver panel trend:  TBili 0.6   /   AST 11   /   ALT 9   /   AlkP 71   /   Tptn 6.3   /   Alb 4.2    /   DBili --      03-07      PT/INR - ( 07 Mar 2025 18:19 )   PT: 12.00 sec;   INR: 1.02 ratio         PTT - ( 07 Mar 2025 18:19 )  PTT:36.4 sec        Radiology:  CT Abdomen and Pelvis w/ IV Cont:   ACC: [id number] EXAM:  CT ABDOMEN AND PELVIS IC   ORDERED BY: NURIS NICHOLAS     PROCEDURE DATE:  03/08/2025          INTERPRETATION:  CLINICAL STATEMENT: Metastatic workup. Thoracic   lymphadenopathy    TECHNIQUE: Contiguous axial CT images were obtained from the lower chest   to the pubic symphysis following administration of 100cc Omnipaque 350   intravenous contrast.  Oral contrast was not administered.  Reformatted   images in the coronal and sagittal planes were acquired.    COMPARISON: CT chest 3/7/2025, CT abdomen and pelvis 4/7/2024    FINDINGS:    Thorax better evaluated on 3/7/2025 CTA. Unchanged bilateral pleural   effusions.    HEPATOBILIARY: Postcholecystectomy. No biliary dilation. Liver   unremarkable.    SPLEEN: Unremarkable.    PANCREAS: Unremarkable.    ADRENAL GLANDS: Unchanged bilateral adrenal nodules, measuring up to 1.8   cm on right an 3.1 x 1.1 cm on left.    KIDNEYS: No hydronephrosis. Symmetric bilateral renal enhancement.    ABDOMINOPELVIC NODES: Unremarkable.    PELVIC ORGANS: Obscured by metallic streak artifact. Post hysterectomy.    PERITONEUM/MESENTERY/BOWEL: Colonic diverticulosis. No bowel obstruction.   No ascites..    BONES/SOFT TISSUES: Left hip total arthroplasty. Degenerative change of   spine    VASCULAR: Vascular calcifications.    OTHER: Unremarkable.      IMPRESSION:  Since 4/7/2024:    1. No definite sites of neoplastic disease within abdomen and pelvis.  2. Unchanged bilateral adrenal nodules, measuring up to 1.8 cm on right   an 3.1 x 1.1 cm on left.    --- End of Report ---      LANNY COLEMAN MD; Attending Radiologist  This document has been electronically signed. Mar  8 2025  2:45PM (03-08-25 @ 14:37)

## 2025-03-09 NOTE — PROGRESS NOTE ADULT - ASSESSMENT
89-year-old female with past medical history of hypertension, hyperlipidemia, CHF, pulmonary hypertension, aortic stenosis, COPD not on oxygen, history of DVT not on anticoagulation presents for evaluation of left shoulder to arm pain.  She denies any trauma or injury. DNR/DNI, daughter providing additional history. Admitted to medicine for further management.     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   - start on Iv Lasix 40 mg - on 3 L nc satting 85% on RA.   - hemeonc consult requesting biopsy   - send esr and crp - ekg no ST elevations.   - Keep Mg >2, K >4    #RLE tenderness  - Check duplex    Constipation  - c/w Bowel Regimen     Hx HTN  and  HLD - Lopressor ,  Amlodipine, Lipitor     Hx  Cognitive Decline -  Donepezil  delirium precautions such as regulating sleep wake cycle, constant reorientation by staff, opening blinds during the day, out of bed to chair as tolerated, avoiding medications that can worsen delirium such as anticholinergics, antihistamines, benzodiazapines, opiods.  patient has short term memory forgetfulness. wonders at night. may need 1 to 1 for safety.     Reported history COPD but not officially diagnosed.   - Duoneb q6h  - albuterol HFA PRN q6h   - monitor O2 on RA  - Symbicort  Spiriva  - patient did not officially get PFT outpatient.

## 2025-03-09 NOTE — CONSULT NOTE ADULT - SUBJECTIVE AND OBJECTIVE BOX
Outpt cardiologist:    HPI:  89-year-old female with past medical history of hypertension, hyperlipidemia, CHF, pulmonary hypertension, aortic stenosis, COPD not on oxygen, history of DVT not on anticoagulation presents for evaluation of left shoulder to arm pain.  She denies any trauma or injury.  She denies any fever, cough sore throat, chills, nausea, vomiting, shortness of breath, abdominal pain, urinary complaints. Patient reports that left arm pain started 2-3 weeks ago but has progressively worsened since.  Per collateral obtained from daughter, she has been a little SOB the past few days. Per ED chart, daughter reported that patient is DNR/DNI.    In ED, T 97.7, /63, HR 56, RR 18, SpO2 94% on RA.   Labs: Hgb 11.9, Trop 13--> 14, Mg 1.7, Pro-BNP 1888  EKG: Sinus bradycardia, 1st degree AV block, RAD     CTA Chest: No evidence of pulmonary embolism.    Mediastinal lymphadenopathy is noted. This includes increased soft tissue   density (4.3 x 2.3 cm) in the subcarinal region suspicious for   lymphadenopathy and/or esophageal mass. Further investigation is   recommended.The pulmonary artery measures 3.4 cm which may be seen with pulmonary   hypertension.    s/p Mg 2g IV    Admitted for further management.    (07 Mar 2025 23:35)      ---  Cardiac Fellow Additional notes:        PAST MEDICAL & SURGICAL HISTORY  HTN (hypertension)    Hypercholesteremia    Heavy cigarette smoker    History of laparoscopic cholecystectomy    H/O: hysterectomy    History of surgery  LEFT EYE CATARACT EXTRACTION WITH LENS  IMPLANT        FAMILY HISTORY:  FAMILY HISTORY:      SOCIAL HISTORY:  Social History:      ALLERGIES:  No Known Allergies      MEDICATIONS:  albuterol/ipratropium for Nebulization 3 milliLiter(s) Nebulizer every 6 hours  amLODIPine   Tablet 5 milliGRAM(s) Oral at bedtime  atorvastatin 40 milliGRAM(s) Oral at bedtime  donepezil 10 milliGRAM(s) Oral at bedtime  enoxaparin Injectable 40 milliGRAM(s) SubCutaneous every 24 hours  furosemide   Injectable 20 milliGRAM(s) IV Push daily  metoprolol tartrate 25 milliGRAM(s) Oral two times a day  polyethylene glycol 3350 17 Gram(s) Oral two times a day  senna 2 Tablet(s) Oral at bedtime  traZODone 150 milliGRAM(s) Oral at bedtime    PRN:      HOME MEDICATIONS:  Home Medications:  amLODIPine 5 mg oral tablet: 1.5 tab(s) orally once a day (at bedtime) (07 Apr 2024 23:20)  Breztri Aerosphere 160 mcg-9 mcg-4.8 mcg/inh inhalation aerosol: 1 puff(s) inhaled 2 times a day (08 Mar 2025 00:42)  donepezil 10 mg oral tablet: 1 tab(s) orally once a day (06 Mar 2024 06:57)  Lipitor 40 mg oral tablet: 1 tab(s) orally once a day (27 May 2022 08:49)  metoprolol: 25 milligram(s) orally 2 times a day (08 Mar 2025 00:39)  polyethylene glycol 3350 oral powder for reconstitution: 17 gram(s) orally 2 times a day (11 Mar 2024 11:23)  Retaine MGD ophthalmic emulsion: 1 application to each affected eye once a day (at bedtime) (08 Mar 2025 00:43)  senna leaf extract oral tablet: 2 tab(s) orally once a day (at bedtime) (11 Mar 2024 11:23)  traZODone 150 mg oral tablet: 0.5 orally once a day (at bedtime) (07 Apr 2024 23:23)      VITALS:   T(F): 97.7 (03-09 @ 17:11), Max: 98.1 (03-08 @ 05:10)  HR: 58 (03-09 @ 17:11) (53 - 72)  BP: 123/634 (03-09 @ 17:11) (123/634 - 186/63)  BP(mean): 96 (03-08 @ 07:45) (96 - 96)  RR: 18 (03-09 @ 17:11) (18 - 19)  SpO2: 97% (03-09 @ 08:00) (83% - 98%)    I&O's Summary      REVIEW OF SYSTEMS:  CONSTITUTIONAL: No weakness, fevers or chills  HEENT: No visual changes, neck/ear pain  RESPIRATORY: No cough, sob  CARDIOVASCULAR: See HPI  GASTROINTESTINAL: No abdominal pain. No nausea, vomiting, diarrhea   GENITOURINARY: No dysuria, frequency or hematuria  NEUROLOGICAL: No new focal deficits  SKIN: No new rashes    PHYSICAL EXAM:  General: Not in distress.  Non-toxic appearing.   HEENT: EOMI  Cardio: regular, S1, S2, no murmur  Pulm: B/L BS.  No wheezing / crackles / rales  Abdomen: Soft, non-tender, non-distended. Normoactive bowel sounds  Extremities: No edema b/l le  Neuro: A&O x3. No focal deficits    LABS:                        11.5   7.55  )-----------( 179      ( 08 Mar 2025 07:03 )             35.8     03-08    140  |  103  |  10  ----------------------------<  88  3.8   |  27  |  0.6[L]    Ca    8.3[L]      08 Mar 2025 07:03  Mg     2.0     03-08                Troponin trend:            RADIOLOGY:    -CXR:  mild vascular congestion   mild pleural effusions     -TTE:   1. Normal global left ventricular systolic function.   2. LV Ejection Fraction by Mejia's Method with a biplane EF of 66 %.   3. The left ventricular diastolic function could not be assessed in this   study.   4. Mildly enlarged leftatrium.   5. Mildly enlarged right atrium.   6. Degenerative mitral valve.   7. Mild thickening and calcification of the anterior and posterior   mitral valve leaflets.   8. Moderate mitral annular calcification.   9. Mild mitral valve regurgitation.  10. Mild tricuspid regurgitation.  11. Estimated pulmonary artery systolic pressure is 63.8 mmHg assuming a   right atrial pressure of 3 mmHg, which is consistent with severe   pulmonary hypertension.  12. Moderate aortic valve stenosis. Peak/mean PG 45/24 mmHg, DONTE 1.1 cm^2.  13. Moderate aortic regurgitation.    -CCTA:    -STRESS TEST:    -CATHETERIZATION:      -OTHER:  ECG:      TELEMETRY EVENTS:   Outpt cardiologist:    HPI:  89-year-old female with past medical history of hypertension, hyperlipidemia, CHF, pulmonary hypertension, aortic stenosis, COPD not on oxygen, history of DVT not on anticoagulation presents for evaluation of left shoulder to arm pain.  She denies any trauma or injury.  She denies any fever, cough sore throat, chills, nausea, vomiting, shortness of breath, abdominal pain, urinary complaints. Patient reports that left arm pain started 2-3 weeks ago but has progressively worsened since.  Per collateral obtained from daughter, she has been a little SOB the past few days. Per ED chart, daughter reported that patient is DNR/DNI.    In ED, T 97.7, /63, HR 56, RR 18, SpO2 94% on RA.   Labs: Hgb 11.9, Trop 13--> 14, Mg 1.7, Pro-BNP 1888  EKG: Sinus bradycardia, 1st degree AV block, RAD     CTA Chest: No evidence of pulmonary embolism.    Mediastinal lymphadenopathy is noted. This includes increased soft tissue   density (4.3 x 2.3 cm) in the subcarinal region suspicious for   lymphadenopathy and/or esophageal mass. Further investigation is   recommended.The pulmonary artery measures 3.4 cm which may be seen with pulmonary   hypertension.    s/p Mg 2g IV    Admitted for further management.    (07 Mar 2025 23:35)    ---  Cardiac Fellow Additional notes:    89-year-old female with past medical history of moderate AS, hypertension, hyperlipidemia, CHF, pulmonary hypertension, aortic stenosis, COPD not on oxygen, history of DVT not on anticoagulation presents for evaluation of left shoulder to arm pain.  Cardiology consulted for preop risk strat prior to  Subcarinal/Esophgeal Mass Suspicious for Neoplasia on CTA Chest biopsy.   Patient has dementia and has a 24 hr home help aid. METS< 4.       PAST MEDICAL & SURGICAL HISTORY  HTN (hypertension)    Hypercholesteremia    Heavy cigarette smoker    History of laparoscopic cholecystectomy    H/O: hysterectomy    History of surgery  LEFT EYE CATARACT EXTRACTION WITH LENS  IMPLANT      FAMILY HISTORY:  FAMILY HISTORY:      SOCIAL HISTORY:  Social History:      ALLERGIES:  No Known Allergies      MEDICATIONS:  albuterol/ipratropium for Nebulization 3 milliLiter(s) Nebulizer every 6 hours  amLODIPine   Tablet 5 milliGRAM(s) Oral at bedtime  atorvastatin 40 milliGRAM(s) Oral at bedtime  donepezil 10 milliGRAM(s) Oral at bedtime  enoxaparin Injectable 40 milliGRAM(s) SubCutaneous every 24 hours  furosemide   Injectable 20 milliGRAM(s) IV Push daily  metoprolol tartrate 25 milliGRAM(s) Oral two times a day  polyethylene glycol 3350 17 Gram(s) Oral two times a day  senna 2 Tablet(s) Oral at bedtime  traZODone 150 milliGRAM(s) Oral at bedtime    PRN:      HOME MEDICATIONS:  Home Medications:  amLODIPine 5 mg oral tablet: 1.5 tab(s) orally once a day (at bedtime) (07 Apr 2024 23:20)  Breztri Aerosphere 160 mcg-9 mcg-4.8 mcg/inh inhalation aerosol: 1 puff(s) inhaled 2 times a day (08 Mar 2025 00:42)  donepezil 10 mg oral tablet: 1 tab(s) orally once a day (06 Mar 2024 06:57)  Lipitor 40 mg oral tablet: 1 tab(s) orally once a day (27 May 2022 08:49)  metoprolol: 25 milligram(s) orally 2 times a day (08 Mar 2025 00:39)  polyethylene glycol 3350 oral powder for reconstitution: 17 gram(s) orally 2 times a day (11 Mar 2024 11:23)  Retaine MGD ophthalmic emulsion: 1 application to each affected eye once a day (at bedtime) (08 Mar 2025 00:43)  senna leaf extract oral tablet: 2 tab(s) orally once a day (at bedtime) (11 Mar 2024 11:23)  traZODone 150 mg oral tablet: 0.5 orally once a day (at bedtime) (07 Apr 2024 23:23)      VITALS:   T(F): 97.7 (03-09 @ 17:11), Max: 98.1 (03-08 @ 05:10)  HR: 58 (03-09 @ 17:11) (53 - 72)  BP: 123/634 (03-09 @ 17:11) (123/634 - 186/63)  BP(mean): 96 (03-08 @ 07:45) (96 - 96)  RR: 18 (03-09 @ 17:11) (18 - 19)  SpO2: 97% (03-09 @ 08:00) (83% - 98%)    I&O's Summary      REVIEW OF SYSTEMS:  CONSTITUTIONAL: No weakness, fevers or chills  HEENT: No visual changes, neck/ear pain  RESPIRATORY: No cough, sob  CARDIOVASCULAR: See HPI  GASTROINTESTINAL: No abdominal pain. No nausea, vomiting, diarrhea   GENITOURINARY: No dysuria, frequency or hematuria  NEUROLOGICAL: No new focal deficits  SKIN: No new rashes    PHYSICAL EXAM:  General: Not in distress.  Non-toxic appearing.   HEENT: EOMI  Cardio: regular, S1, S2, no murmur  Pulm: B/L BS.  No wheezing / crackles / rales  Abdomen: Soft, non-tender, non-distended. Normoactive bowel sounds  Extremities: No edema b/l le  Neuro: A&O x3. No focal deficits    LABS:                        11.5   7.55  )-----------( 179      ( 08 Mar 2025 07:03 )             35.8     03-08    140  |  103  |  10  ----------------------------<  88  3.8   |  27  |  0.6[L]    Ca    8.3[L]      08 Mar 2025 07:03  Mg     2.0     03-08    Troponin trend:     RADIOLOGY:    -CXR:  mild vascular congestion   mild pleural effusions     -TTE: 3/8/25   1. Normal global left ventricular systolic function.   2. LV Ejection Fraction by Mejia's Method with a biplane EF of 66 %.   3. The left ventricular diastolic function could not be assessed in this   study.   4. Mildly enlarged leftatrium.   5. Mildly enlarged right atrium.   6. Degenerative mitral valve.   7. Mild thickening and calcification of the anterior and posterior   mitral valve leaflets.   8. Moderate mitral annular calcification.   9. Mild mitral valve regurgitation.  10. Mild tricuspid regurgitation.  11. Estimated pulmonary artery systolic pressure is 63.8 mmHg assuming a   right atrial pressure of 3 mmHg, which is consistent with severe   pulmonary hypertension.  12. Moderate aortic valve stenosis. Peak/mean PG 45/24 mmHg, DONTE 1.1 cm^2.  13. Moderate aortic regurgitation.    ECG:  Type 1 AV block     TELEMETRY EVENTS: - NSR     * SUMMARY:      * Patient-based characteristics (Functional capacity)  Patient is able to achieve more than 4 METS (walk 4 blocks, climb 2 flights of stairs, etc...)          Y [] / N [x]    High-risk patient features:  - Recent (<30 days) or active MI          Y [] / N [X]  - Unstable or severe angina          Y [] / N [X]  - Decompensated heart failure, or worsening or new-onset heart failure          Y [] / N [X]  - Severe valvular disease          Y [] / N [X]  - Significant arrhythmia (Tachy- or Bradyarrhythmia)          Y [] / N [X]    * Surgery/Procedure-based characteristics (Type of surgery)  - Low-risk procedure (outpatient procedure, elective, endoscopy, etc...)          Y [] / N []  - Elevated or Moderate-risk procedure (Inpatient)          Y [x] / N []  - High-risk procedure (urgent/emergent procedure, Intrathoracic, vascular, etc...)          Y [] / N []    * Revised Cardiac Risk Index (RCRI)  1- History of ischemic heart disease          Y [] / N [X]  2- History of congestive heart failure          Y [] / N [X]  3- History of stroke/TIA          Y [] / N [X]  4- History of insulin-dependent diabetes          Y [] / N [X]  5- Chronic kidney disease (Cr >2mg/dL)          Y [] / N [X]  6- Undergoing suprainguinal vascular, intraperitoneal, or intrathoracic surgery          Y [] / N [X]               HPI:  89-year-old female with past medical history of hypertension, hyperlipidemia, CHF, pulmonary hypertension, aortic stenosis, COPD not on oxygen, history of DVT not on anticoagulation presents for evaluation of left shoulder to arm pain.  She denies any trauma or injury.  She denies any fever, cough sore throat, chills, nausea, vomiting, shortness of breath, abdominal pain, urinary complaints. Patient reports that left arm pain started 2-3 weeks ago but has progressively worsened since.  Per collateral obtained from daughter, she has been a little SOB the past few days. Per ED chart, daughter reported that patient is DNR/DNI.    In ED, T 97.7, /63, HR 56, RR 18, SpO2 94% on RA.   Labs: Hgb 11.9, Trop 13--> 14, Mg 1.7, Pro-BNP 1888  EKG: Sinus bradycardia, 1st degree AV block, RAD     CTA Chest: No evidence of pulmonary embolism.    Mediastinal lymphadenopathy is noted. This includes increased soft tissue   density (4.3 x 2.3 cm) in the subcarinal region suspicious for   lymphadenopathy and/or esophageal mass. Further investigation is   recommended.The pulmonary artery measures 3.4 cm which may be seen with pulmonary   hypertension.    s/p Mg 2g IV    Admitted for further management.    (07 Mar 2025 23:35)    ---  Cardiac Fellow Additional notes:    89-year-old female with past medical history of moderate AS, hypertension, hyperlipidemia, CHF, pulmonary hypertension, aortic stenosis, COPD not on oxygen, history of DVT not on anticoagulation presents for evaluation of left shoulder to arm pain.  Cardiology consulted for preop risk strat prior to  Subcarinal/Esophgeal Mass Suspicious for Neoplasia on CTA.  Patient has dementia and has a 24 hr home help aid. METS< 4.       PAST MEDICAL & SURGICAL HISTORY  HTN (hypertension)    Hypercholesteremia    Heavy cigarette smoker    History of laparoscopic cholecystectomy    H/O: hysterectomy    History of surgery  LEFT EYE CATARACT EXTRACTION WITH LENS  IMPLANT      ALLERGIES:  No Known Allergies      MEDICATIONS:  albuterol/ipratropium for Nebulization 3 milliLiter(s) Nebulizer every 6 hours  amLODIPine   Tablet 5 milliGRAM(s) Oral at bedtime  atorvastatin 40 milliGRAM(s) Oral at bedtime  donepezil 10 milliGRAM(s) Oral at bedtime  enoxaparin Injectable 40 milliGRAM(s) SubCutaneous every 24 hours  furosemide   Injectable 20 milliGRAM(s) IV Push daily  metoprolol tartrate 25 milliGRAM(s) Oral two times a day  polyethylene glycol 3350 17 Gram(s) Oral two times a day  senna 2 Tablet(s) Oral at bedtime  traZODone 150 milliGRAM(s) Oral at bedtime      HOME MEDICATIONS:  Home Medications:  amLODIPine 5 mg oral tablet: 1.5 tab(s) orally once a day (at bedtime) (07 Apr 2024 23:20)  Breztri Aerosphere 160 mcg-9 mcg-4.8 mcg/inh inhalation aerosol: 1 puff(s) inhaled 2 times a day (08 Mar 2025 00:42)  donepezil 10 mg oral tablet: 1 tab(s) orally once a day (06 Mar 2024 06:57)  Lipitor 40 mg oral tablet: 1 tab(s) orally once a day (27 May 2022 08:49)  metoprolol: 25 milligram(s) orally 2 times a day (08 Mar 2025 00:39)  polyethylene glycol 3350 oral powder for reconstitution: 17 gram(s) orally 2 times a day (11 Mar 2024 11:23)  Retaine MGD ophthalmic emulsion: 1 application to each affected eye once a day (at bedtime) (08 Mar 2025 00:43)  senna leaf extract oral tablet: 2 tab(s) orally once a day (at bedtime) (11 Mar 2024 11:23)  traZODone 150 mg oral tablet: 0.5 orally once a day (at bedtime) (07 Apr 2024 23:23)      VITALS:   T(F): 97.7 (03-09 @ 17:11), Max: 98.1 (03-08 @ 05:10)  HR: 58 (03-09 @ 17:11) (53 - 72)  BP: 123/634 (03-09 @ 17:11) (123/634 - 186/63)  BP(mean): 96 (03-08 @ 07:45) (96 - 96)  RR: 18 (03-09 @ 17:11) (18 - 19)  SpO2: 97% (03-09 @ 08:00) (83% - 98%)    ROS:  Unable to obtain (dementia)    PHYSICAL EXAM:  General: Not in distress.  Non-toxic appearing.   HEENT: EOMI  Cardio: regular, S1, S2  Pulm: B/L BS.  No wheezing / crackles / rales  Abdomen: Soft, non-tender, non-distended  Extremities: No edema b/l le      LABS:                        11.5   7.55  )-----------( 179      ( 08 Mar 2025 07:03 )             35.8     03-08    140  |  103  |  10  ----------------------------<  88  3.8   |  27  |  0.6[L]    Ca    8.3[L]      08 Mar 2025 07:03  Mg     2.0     03-08        RADIOLOGY:    -CXR:  mild vascular congestion   mild pleural effusions     -TTE: 3/8/25   1. Normal global left ventricular systolic function.   2. LV Ejection Fraction by Mejia's Method with a biplane EF of 66 %.   3. The left ventricular diastolic function could not be assessed in this   study.   4. Mildly enlarged leftatrium.   5. Mildly enlarged right atrium.   6. Degenerative mitral valve.   7. Mild thickening and calcification of the anterior and posterior   mitral valve leaflets.   8. Moderate mitral annular calcification.   9. Mild mitral valve regurgitation.  10. Mild tricuspid regurgitation.  11. Estimated pulmonary artery systolic pressure is 63.8 mmHg assuming a   right atrial pressure of 3 mmHg, which is consistent with severe   pulmonary hypertension.  12. Moderate aortic valve stenosis. Peak/mean PG 45/24 mmHg, DONTE 1.1 cm^2.  13. Moderate aortic regurgitation.    ECG:  Type 1 AV block     TELEMETRY EVENTS: - NSR

## 2025-03-09 NOTE — CONSULT NOTE ADULT - ASSESSMENT
* IMPRESSION & RECOMMENDATIONS:  Moderate to High -risk patient for  surgery/procedure. Class I risk (Zero factors) --> 3.9% (30-day risk of death, MI, or cardiac arrest)  Risk mostly due to her age, moderate AS, PASP 63 mmHg, Mets< 4.   No further cardiac work-up is needed at the moment. There are no current cardiac contraindications to prevent from proceeding with the scheduled surgery/procedure.     # Moderate AS  # PASP 63 mmHg   # COPD   # HFpEF   # Subcarinal/Esophgeal Mass Suspicious for Neoplasia on CTA Chest biopsy.     PLAN   - Cw with furosemide 20 iv daily   - Have cardiac anesthesia involved in case  since PASP 63 mmHg  - diuresis to potentially help     - This consult serves only as a martinez-operative cardiac risk stratification and evaluation to predict 30-days cardiac complications risk and mortality. The decision to proceed with the surgery/procedure is made by the performing physician and the patient - * IMPRESSION & RECOMMENDATIONS:  Moderate to High -risk patient for  surgery/procedure. Class I risk (Zero factors) --> 3.9% (30-day risk of death, MI, or cardiac arrest)  Risk mostly due to her age, moderate AS, PASP 63 mmHg, Mets< 4.       # Moderate AS  # PASP 63 mmHg   # COPD   # HFpEF   # Subcarinal/Esophgeal Mass Suspicious for Neoplasia on CTA Chest biopsy.     PLAN   - Cw with furosemide 20 iv daily         - This consult serves only as a martinez-operative cardiac risk stratification and evaluation to predict 30-days cardiac complications risk and mortality. The decision to proceed with the surgery/procedure is made by the performing physician and the patient - * IMPRESSION & RECOMMENDATIONS:   High -risk patient for  surgery/procedure. Class I risk (Zero factors) --> 3.9% (30-day risk of death, MI, or cardiac arrest)  Risk mostly due to her age, moderate AS, PASP 63 mmHg, Mets< 4.       # Moderate AS  # PASP 63 mmHg   # COPD   # HFpEF   # Subcarinal/Esophgeal Mass Suspicious for Neoplasia on CTA Chest biopsy.     PLAN   - Cw with furosemide 20 iv daily     - This consult serves only as a martinez-operative cardiac risk stratification and evaluation to predict 30-days cardiac complications risk and mortality. The decision to proceed with the surgery/procedure is made by the performing physician and the patient - # Moderate AS  # PASP 63 mmHg   # COPD   # HFpEF   # Subcarinal/Esophgeal Mass Suspicious for Neoplasia      PLAN   - Cw with furosemide 20 iv daily   - High -risk patient for surgery/procedure. Class I risk (Zero factors) --> 3.9% (30-day risk of death, MI, or cardiac arrest)  - Risk mostly due to her age, moderate AS, PASP 63 mmHg, Mets< 4.

## 2025-03-09 NOTE — CONSULT NOTE ADULT - ASSESSMENT
89-year-old female with past medical history of hypertension, hyperlipidemia, CHFpEF, severe pulmonary hypertension, mod  aortic stenosis, COPD not on oxygen, history of DVT not on anticoagulation presents for evaluation of left shoulder to arm pain.  Admitted to medicine for AHRF work up. CT revealed mediastinal mass. [facility] consulted for possible EUS guided biopsy      #Mediastinal mass r/o malignancy  - CTAP :post CCY, no other masses except adrenal nodules  - CT chest angio: Mediastinal lymphadenopathy is noted. This includes increased soft tissue density (4.3 x 2.3 cm) in thesubcarinal region suspicious for lymphadenopathy and/or esophageal mass.   - Pt asymptomatic, no AC, no prior scopes  - Former heavy smoker  - Currently on 5L O2  - ECHO: severe pulm HTN, mod AS    RECS  - Will recommend pulmonary & cardiac risk stratification for EGD/EUS next week pending optimization  - Diet per primary team  - We will follow

## 2025-03-09 NOTE — CONSULT NOTE ADULT - ATTENDING COMMENTS
Advanced Dementia  Acute HFpEF  Moderate AS  COPD  METs <4    CT Chest reviewed:  Small b/l effusions    High-risk for perioperative MACE  IV diuresis with Lasix 20 mg daily prior to procedures    This consult serves only as a perioperative cardiac risk stratification and evaluation to predict 30-day cardiac complications risk and mortality.  The decision to proceed with the surgery/procedure is made by the performing physician and the patient.    Plan discussed with patient's daughter who states she does not want her mother to go for any invasive procedures at this time. Advanced Dementia  Acute HFpEF  Moderate AS  Severe PHTN / COPD  METs <4    CT Chest reviewed:  Small b/l effusions    Low-risk procedure  High-risk for perioperative MACE  IV diuresis with Lasix 20 mg daily prior to procedures    This consult serves only as a perioperative cardiac risk stratification and evaluation to predict 30-day cardiac complications risk and mortality.  The decision to proceed with the surgery/procedure is made by the performing physician and the patient.    Plan discussed with patient's daughter who states she does not want her mother to go for any invasive procedures at this time.

## 2025-03-10 LAB
ALBUMIN SERPL ELPH-MCNC: 3.8 G/DL — SIGNIFICANT CHANGE UP (ref 3.5–5.2)
ALP SERPL-CCNC: 72 U/L — SIGNIFICANT CHANGE UP (ref 30–115)
ALT FLD-CCNC: 9 U/L — SIGNIFICANT CHANGE UP (ref 0–41)
ANION GAP SERPL CALC-SCNC: 12 MMOL/L — SIGNIFICANT CHANGE UP (ref 7–14)
AST SERPL-CCNC: 9 U/L — SIGNIFICANT CHANGE UP (ref 0–41)
BASOPHILS # BLD AUTO: 0.03 K/UL — SIGNIFICANT CHANGE UP (ref 0–0.2)
BASOPHILS NFR BLD AUTO: 0.5 % — SIGNIFICANT CHANGE UP (ref 0–1)
BILIRUB SERPL-MCNC: 0.6 MG/DL — SIGNIFICANT CHANGE UP (ref 0.2–1.2)
BUN SERPL-MCNC: 20 MG/DL — SIGNIFICANT CHANGE UP (ref 10–20)
CALCIUM SERPL-MCNC: 8.1 MG/DL — LOW (ref 8.4–10.5)
CHLORIDE SERPL-SCNC: 101 MMOL/L — SIGNIFICANT CHANGE UP (ref 98–110)
CO2 SERPL-SCNC: 28 MMOL/L — SIGNIFICANT CHANGE UP (ref 17–32)
CREAT SERPL-MCNC: 0.8 MG/DL — SIGNIFICANT CHANGE UP (ref 0.7–1.5)
EGFR: 71 ML/MIN/1.73M2 — SIGNIFICANT CHANGE UP
EGFR: 71 ML/MIN/1.73M2 — SIGNIFICANT CHANGE UP
EOSINOPHIL # BLD AUTO: 0.04 K/UL — SIGNIFICANT CHANGE UP (ref 0–0.7)
EOSINOPHIL NFR BLD AUTO: 0.6 % — SIGNIFICANT CHANGE UP (ref 0–8)
GLUCOSE SERPL-MCNC: 170 MG/DL — HIGH (ref 70–99)
HCT VFR BLD CALC: 33.8 % — LOW (ref 37–47)
HGB BLD-MCNC: 11 G/DL — LOW (ref 12–16)
IMM GRANULOCYTES NFR BLD AUTO: 0.6 % — HIGH (ref 0.1–0.3)
LYMPHOCYTES # BLD AUTO: 1.64 K/UL — SIGNIFICANT CHANGE UP (ref 1.2–3.4)
LYMPHOCYTES # BLD AUTO: 25.1 % — SIGNIFICANT CHANGE UP (ref 20.5–51.1)
MAGNESIUM SERPL-MCNC: 1.7 MG/DL — LOW (ref 1.8–2.4)
MCHC RBC-ENTMCNC: 28.9 PG — SIGNIFICANT CHANGE UP (ref 27–31)
MCHC RBC-ENTMCNC: 32.5 G/DL — SIGNIFICANT CHANGE UP (ref 32–37)
MCV RBC AUTO: 88.9 FL — SIGNIFICANT CHANGE UP (ref 81–99)
MONOCYTES # BLD AUTO: 0.7 K/UL — HIGH (ref 0.1–0.6)
MONOCYTES NFR BLD AUTO: 10.7 % — HIGH (ref 1.7–9.3)
NEUTROPHILS # BLD AUTO: 4.08 K/UL — SIGNIFICANT CHANGE UP (ref 1.4–6.5)
NEUTROPHILS NFR BLD AUTO: 62.5 % — SIGNIFICANT CHANGE UP (ref 42.2–75.2)
NRBC BLD AUTO-RTO: 0 /100 WBCS — SIGNIFICANT CHANGE UP (ref 0–0)
PLATELET # BLD AUTO: 169 K/UL — SIGNIFICANT CHANGE UP (ref 130–400)
PMV BLD: 10 FL — SIGNIFICANT CHANGE UP (ref 7.4–10.4)
POTASSIUM SERPL-MCNC: 3.6 MMOL/L — SIGNIFICANT CHANGE UP (ref 3.5–5)
POTASSIUM SERPL-SCNC: 3.6 MMOL/L — SIGNIFICANT CHANGE UP (ref 3.5–5)
PROT SERPL-MCNC: 5.8 G/DL — LOW (ref 6–8)
RBC # BLD: 3.8 M/UL — LOW (ref 4.2–5.4)
RBC # FLD: 16.4 % — HIGH (ref 11.5–14.5)
SODIUM SERPL-SCNC: 141 MMOL/L — SIGNIFICANT CHANGE UP (ref 135–146)
WBC # BLD: 6.53 K/UL — SIGNIFICANT CHANGE UP (ref 4.8–10.8)
WBC # FLD AUTO: 6.53 K/UL — SIGNIFICANT CHANGE UP (ref 4.8–10.8)

## 2025-03-10 PROCEDURE — 99232 SBSQ HOSP IP/OBS MODERATE 35: CPT

## 2025-03-10 RX ORDER — MAGNESIUM SULFATE 500 MG/ML
2 SYRINGE (ML) INJECTION ONCE
Refills: 0 | Status: COMPLETED | OUTPATIENT
Start: 2025-03-10 | End: 2025-03-10

## 2025-03-10 RX ADMIN — Medication 150 MILLIGRAM(S): at 21:43

## 2025-03-10 RX ADMIN — ATORVASTATIN CALCIUM 40 MILLIGRAM(S): 80 TABLET, FILM COATED ORAL at 21:45

## 2025-03-10 RX ADMIN — POLYETHYLENE GLYCOL 3350 17 GRAM(S): 17 POWDER, FOR SOLUTION ORAL at 05:14

## 2025-03-10 RX ADMIN — Medication 1 APPLICATION(S): at 11:36

## 2025-03-10 RX ADMIN — IPRATROPIUM BROMIDE AND ALBUTEROL SULFATE 3 MILLILITER(S): .5; 2.5 SOLUTION RESPIRATORY (INHALATION) at 07:56

## 2025-03-10 RX ADMIN — METOPROLOL SUCCINATE 25 MILLIGRAM(S): 50 TABLET, EXTENDED RELEASE ORAL at 18:01

## 2025-03-10 RX ADMIN — METOPROLOL SUCCINATE 25 MILLIGRAM(S): 50 TABLET, EXTENDED RELEASE ORAL at 05:15

## 2025-03-10 RX ADMIN — DONEPEZIL HYDROCHLORIDE 10 MILLIGRAM(S): 5 TABLET ORAL at 21:43

## 2025-03-10 RX ADMIN — FUROSEMIDE 20 MILLIGRAM(S): 10 INJECTION INTRAMUSCULAR; INTRAVENOUS at 05:15

## 2025-03-10 RX ADMIN — AMLODIPINE BESYLATE 5 MILLIGRAM(S): 10 TABLET ORAL at 21:43

## 2025-03-10 RX ADMIN — IPRATROPIUM BROMIDE AND ALBUTEROL SULFATE 3 MILLILITER(S): .5; 2.5 SOLUTION RESPIRATORY (INHALATION) at 20:45

## 2025-03-10 RX ADMIN — IPRATROPIUM BROMIDE AND ALBUTEROL SULFATE 3 MILLILITER(S): .5; 2.5 SOLUTION RESPIRATORY (INHALATION) at 16:21

## 2025-03-10 RX ADMIN — ENOXAPARIN SODIUM 40 MILLIGRAM(S): 100 INJECTION SUBCUTANEOUS at 05:20

## 2025-03-10 RX ADMIN — Medication 2 TABLET(S): at 21:43

## 2025-03-10 RX ADMIN — Medication 25 GRAM(S): at 16:21

## 2025-03-10 RX ADMIN — IPRATROPIUM BROMIDE AND ALBUTEROL SULFATE 3 MILLILITER(S): .5; 2.5 SOLUTION RESPIRATORY (INHALATION) at 02:33

## 2025-03-10 NOTE — PROGRESS NOTE ADULT - ASSESSMENT
89-year-old female with past medical history of hypertension, hyperlipidemia, CHFpEF, severe pulmonary hypertension, mod  aortic stenosis, COPD not on oxygen, history of DVT not on anticoagulation presents for evaluation of left shoulder to arm pain.  [facility]. [facility] consulted for possible EUS guided biopsy. Patient co-morbid, not sure how well she would do if this was to be a Cancer given general frail state factoring in age and co-morbidities. Heme Onc on board.       Mediastinal mass r/o malignancy  - CTAP :post CCY, no other masses except adrenal nodules  - CT chest angio: Mediastinal lymphadenopathy is noted. This includes increased soft tissue density (4.3 x 2.3 cm) in the subcarinal region suspicious for lymphadenopathy and/or esophageal mass.   - Pt asymptomatic, no AC, no prior scopes  - Former heavy smoker  - Currently on 5L O2  - ECHO: severe pulm HTN, mod AS  - [facility] clearance notes High Risk- Awaiting final  - Will speak with anesthesia regarding when the case can be done, and if needed to be done by Cardiac Anesthesia   - Will follow

## 2025-03-10 NOTE — PROGRESS NOTE ADULT - ASSESSMENT
89-year-old female with past medical history of hypertension, hyperlipidemia, CHF, pulmonary hypertension, aortic stenosis, COPD not on oxygen, history of DVT not on anticoagulation presents for evaluation of left shoulder to arm pain.  She denies any trauma or injury. DNR/DNI, daughter providing additional history. Admitted to medicine for further management.     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   - start on Iv Lasix 40 mg - on 3 L nc satting 85% on RA.   - hemeonc consult requesting biopsy   - send esr and crp - ekg no ST elevations.   - Keep Mg >2, K >4    #RLE tenderness  - Check duplex    Constipation  - c/w Bowel Regimen     Hx HTN  and  HLD - Lopressor ,  Amlodipine, Lipitor     Hx  Cognitive Decline -  Donepezil  delirium precautions such as regulating sleep wake cycle, constant reorientation by staff, opening blinds during the day, out of bed to chair as tolerated, avoiding medications that can worsen delirium such as anticholinergics, antihistamines, benzodiazapines, opiods.  patient has short term memory forgetfulness. wonders at night. may need 1 to 1 for safety.     Reported history COPD but not officially diagnosed.   - Duoneb q6h  - albuterol HFA PRN q6h   - monitor O2 on RA  - Symbicort  Spiriva  - patient did not officially get PFT outpatient.      89-year-old female with past medical history of hypertension, hyperlipidemia, CHF, pulmonary hypertension, aortic stenosis, COPD not on oxygen, history of DVT not on anticoagulation presents for evaluation of left shoulder to arm pain.  She denies any trauma or injury. DNR/DNI, daughter providing additional history. Admitted to medicine for further management.     Atypical Chest Pain likely due to the Mediastinal Mass vs HfpEF vs severe Pulm Htn vs mod AS   HFpEF (EF 75%, G2DD 3/2024) exacerbation   AHRF  Severe Pulmonary Hypertension- maybe due to HFpEF    Mediastinal Mass : Subcarinal/Esophgeal Mass Suspicious for Neoplasia on CTA Chest  Moderate Aortic Stenosis  TTE (03/2024): EF 75%, G2DD, Mild TR, Moderate to Severe AS, Peak/mean PG 71/40, DONTE 1.1 cm ^2  -TTE 3/6/24: EF 75%, Grade II Diastolic dysfunction, mod-severe AS, Mild Pulm Htn  -TTE 3/8/25:  EF 66%, severe Pulm Htn, mod AS   ECG: ine Sinus bradycardia with 1st degree A-V block Rightward axis  Trops  13 --> 14 --> 18, BNP 1.8K   - advanced GI consult: was planned for possible EGD/EUS- however per discussion w/ pt's daughter: they don't want to pursue EGD/EUS - per the pt's wishes she would not want to pursue chemo even if found to have cancer. Therefore they do not want to undergo a high risk procedure   - CT chest showing pleural effusions and trivial pericardial effusions  - sp Iv Lasix 40 mg - on 2 L nc --> will likely need home O2  - holding lasix for now   - send esr and crp - ekg no ST elevations.   - Keep Mg >2, K >4    #RLE tenderness  - Check duplex    Constipation  - c/w Bowel Regimen     Hx HTN  and  HLD - Lopressor ,  Amlodipine, Lipitor     Hx  Cognitive Decline -  Donepezil  delirium precautions such as regulating sleep wake cycle, constant reorientation by staff, opening blinds during the day, out of bed to chair as tolerated, avoiding medications that can worsen delirium such as anticholinergics, antihistamines, benzodiazapines, opiods.  patient has short term memory forgetfulness. wonders at night. may need 1 to 1 for safety.     Reported history COPD but not officially diagnosed.   - Duoneb q6h  - albuterol HFA PRN q6h   - monitor O2 on RA  - Symbicort  Spiriva  - patient did not officially get PFT outpatient.      89-year-old female with past medical history of hypertension, hyperlipidemia, CHF, pulmonary hypertension, aortic stenosis, COPD not on oxygen, history of DVT not on anticoagulation presents for evaluation of left shoulder to arm pain.  She denies any trauma or injury. DNR/DNI, daughter providing additional history. Admitted to medicine for further management.     Atypical Chest Pain likely due to the Mediastinal Mass vs HfpEF vs severe Pulm Htn vs mod AS   HFpEF (EF 75%, G2DD 3/2024) exacerbation   AHRF  Severe Pulmonary Hypertension- maybe due to HFpEF    Mediastinal Mass : Subcarinal/Esophgeal Mass Suspicious for Neoplasia on CTA Chest  Moderate Aortic Stenosis  TTE (03/2024): EF 75%, G2DD, Mild TR, Moderate to Severe AS, Peak/mean PG 71/40, DONTE 1.1 cm ^2  -TTE 3/6/24: EF 75%, Grade II Diastolic dysfunction, mod-severe AS, Mild Pulm Htn  -TTE 3/8/25:  EF 66%, severe Pulm Htn, mod AS   ECG: ine Sinus bradycardia with 1st degree A-V block Rightward axis  Trops  13 --> 14 --> 18, BNP 1.8K   - advanced GI consult: was planned for possible EGD/EUS- however per discussion w/ pt's daughter: they don't want to pursue EGD/EUS - per the pt's wishes she would not want to pursue chemo even if found to have cancer. Therefore they do not want to undergo a high risk procedure   - CT chest showing pleural effusions and trivial pericardial effusions  - sp Iv Lasix 40 mg - on 2 L nc --> will likely need home O2  - holding lasix for now    - ekg no ST elevations.   - Keep Mg >2, K >4  - f/u HF c/s     #RLE tenderness  - LE duplex: no DVT     Constipation  - c/w Bowel Regimen     Hx HTN  and  HLD - Lopressor ,  Amlodipine, Lipitor     Hx  Cognitive Decline -  Donepezil  delirium precautions such as regulating sleep wake cycle, constant reorientation by staff, opening blinds during the day, out of bed to chair as tolerated, avoiding medications that can worsen delirium such as anticholinergics, antihistamines, benzodiazapines, opiods.  patient has short term memory forgetfulness. wanders at night. may need 1 to 1 for safety. currently has aid at bedside     Reported history COPD but not officially diagnosed.   - Duoneb q6h  - albuterol HFA PRN q6h   - monitor O2 on RA  - Symbicort  Spiriva  - patient did not officially get PFT outpatient.     Will likely need home O2  Prepare for DC tomorrow

## 2025-03-10 NOTE — PROGRESS NOTE ADULT - NS ATTEND AMEND GEN_ALL_CORE FT
Patient seen and examined during the GI advanced endoscopy rounds with GI [place] and GI PA, case discussed during rounds and discussed with primary team, assessment and plan as above, Care goals clearly discussed with the family explaining that this possibly lymphoma or metastatic carcinoma however they are refusing any workup at this time given her advanced dementia, please recall GI if family plan or goals of care changes

## 2025-03-10 NOTE — PROGRESS NOTE ADULT - ASSESSMENT
89-year-old female with past medical history of hypertension, hyperlipidemia, CHF, pulmonary hypertension, aortic stenosis, COPD not on oxygen, history of DVT not on anticoagulation presents for evaluation of left shoulder to arm pain.  She denies any trauma or injury. DNR/DNI, daughter providing additional history. Admitted to medicine for further management.     Atypical Chest Pain likely due to the Mediastinal Mass vs HfpEF   HFpEF (EF 75%, G2DD 3/2024) exacerbation   AHRF  Severe Pulmonary Hypertension- maybe due to HFpEF/ interstitial disease on CT chest  Mediastinal Mass : Subcarinal/Esophgeal Mass Suspicious for Neoplasia on CTA Chest  advanced GI consult: Possible EGD this week, needs cardio clearance-- but patient's daughter said she does not want it as patient does not want chemo later    Moderate Aortic Stenosis  TTE (03/2024): EF 75%, G2DD, Mild TR, Moderate to Severe AS, Peak/mean PG 71/40, DONTE 1.1 cm ^2   was given iv lasix 20mg for 3 days  -  -  #RLE tenderness  - duplex-- no DVT    Constipation  - c/w Bowel Regimen     Hx HTN  and  HLD - Lopressor ,  Amlodipine, Lipitor     Hx  Cognitive Decline -  Donepezil  delirium precautions such as regulating sleep wake cycle, constant reorientation by staff, opening blinds during the day, out of bed to chair as tolerated, avoiding medications that can worsen delirium such as anticholinergics, antihistamines, benzodiazapines, opiods.  patient has short term memory forgetfulness. wonders at night. may need 1 to 1 for safety.     Reported history COPD was a smoker.   - Duoneb q6h  - albuterol HFA PRN q6h   - monitor O2 on RA  - Symbicort  Spiriva  - patient did not officially get PFT outpatient.       DC plan Am--home oxygen is being  arranged -- has home aides

## 2025-03-11 ENCOUNTER — TRANSCRIPTION ENCOUNTER (OUTPATIENT)
Age: 89
End: 2025-03-11

## 2025-03-11 ENCOUNTER — INPATIENT (INPATIENT)
Facility: HOSPITAL | Age: 89
LOS: 5 days | Discharge: HOSPICE | DRG: 204 | End: 2025-03-17
Attending: HOSPITALIST | Admitting: INTERNAL MEDICINE
Payer: MEDICARE

## 2025-03-11 VITALS
TEMPERATURE: 100 F | SYSTOLIC BLOOD PRESSURE: 130 MMHG | RESPIRATION RATE: 18 BRPM | DIASTOLIC BLOOD PRESSURE: 47 MMHG | OXYGEN SATURATION: 95 % | HEART RATE: 78 BPM

## 2025-03-11 VITALS
WEIGHT: 119.93 LBS | HEIGHT: 68 IN | HEART RATE: 68 BPM | RESPIRATION RATE: 18 BRPM | DIASTOLIC BLOOD PRESSURE: 65 MMHG | OXYGEN SATURATION: 98 % | SYSTOLIC BLOOD PRESSURE: 110 MMHG

## 2025-03-11 DIAGNOSIS — I35.0 NONRHEUMATIC AORTIC (VALVE) STENOSIS: ICD-10-CM

## 2025-03-11 DIAGNOSIS — K59.00 CONSTIPATION, UNSPECIFIED: ICD-10-CM

## 2025-03-11 DIAGNOSIS — Z90.710 ACQUIRED ABSENCE OF BOTH CERVIX AND UTERUS: ICD-10-CM

## 2025-03-11 DIAGNOSIS — Z51.5 ENCOUNTER FOR PALLIATIVE CARE: ICD-10-CM

## 2025-03-11 DIAGNOSIS — I44.0 ATRIOVENTRICULAR BLOCK, FIRST DEGREE: ICD-10-CM

## 2025-03-11 DIAGNOSIS — Z71.89 OTHER SPECIFIED COUNSELING: ICD-10-CM

## 2025-03-11 DIAGNOSIS — J98.59 OTHER DISEASES OF MEDIASTINUM, NOT ELSEWHERE CLASSIFIED: ICD-10-CM

## 2025-03-11 DIAGNOSIS — T38.0X5A ADVERSE EFFECT OF GLUCOCORTICOIDS AND SYNTHETIC ANALOGUES, INITIAL ENCOUNTER: ICD-10-CM

## 2025-03-11 DIAGNOSIS — I27.21 SECONDARY PULMONARY ARTERIAL HYPERTENSION: ICD-10-CM

## 2025-03-11 DIAGNOSIS — Z11.52 ENCOUNTER FOR SCREENING FOR COVID-19: ICD-10-CM

## 2025-03-11 DIAGNOSIS — Z66 DO NOT RESUSCITATE: ICD-10-CM

## 2025-03-11 DIAGNOSIS — J96.21 ACUTE AND CHRONIC RESPIRATORY FAILURE WITH HYPOXIA: ICD-10-CM

## 2025-03-11 DIAGNOSIS — Z86.718 PERSONAL HISTORY OF OTHER VENOUS THROMBOSIS AND EMBOLISM: ICD-10-CM

## 2025-03-11 DIAGNOSIS — Z79.51 LONG TERM (CURRENT) USE OF INHALED STEROIDS: ICD-10-CM

## 2025-03-11 DIAGNOSIS — Z96.1 PRESENCE OF INTRAOCULAR LENS: ICD-10-CM

## 2025-03-11 DIAGNOSIS — I50.9 HEART FAILURE, UNSPECIFIED: ICD-10-CM

## 2025-03-11 DIAGNOSIS — Z98.890 OTHER SPECIFIED POSTPROCEDURAL STATES: Chronic | ICD-10-CM

## 2025-03-11 DIAGNOSIS — J44.9 CHRONIC OBSTRUCTIVE PULMONARY DISEASE, UNSPECIFIED: ICD-10-CM

## 2025-03-11 DIAGNOSIS — D49.89 NEOPLASM OF UNSPECIFIED BEHAVIOR OF OTHER SPECIFIED SITES: ICD-10-CM

## 2025-03-11 DIAGNOSIS — E78.00 PURE HYPERCHOLESTEROLEMIA, UNSPECIFIED: ICD-10-CM

## 2025-03-11 DIAGNOSIS — Z90.710 ACQUIRED ABSENCE OF BOTH CERVIX AND UTERUS: Chronic | ICD-10-CM

## 2025-03-11 DIAGNOSIS — R00.1 BRADYCARDIA, UNSPECIFIED: ICD-10-CM

## 2025-03-11 DIAGNOSIS — I11.0 HYPERTENSIVE HEART DISEASE WITH HEART FAILURE: ICD-10-CM

## 2025-03-11 DIAGNOSIS — D72.829 ELEVATED WHITE BLOOD CELL COUNT, UNSPECIFIED: ICD-10-CM

## 2025-03-11 DIAGNOSIS — R06.02 SHORTNESS OF BREATH: ICD-10-CM

## 2025-03-11 DIAGNOSIS — I50.30 UNSPECIFIED DIASTOLIC (CONGESTIVE) HEART FAILURE: ICD-10-CM

## 2025-03-11 DIAGNOSIS — Z99.81 DEPENDENCE ON SUPPLEMENTAL OXYGEN: ICD-10-CM

## 2025-03-11 DIAGNOSIS — Z98.42 CATARACT EXTRACTION STATUS, LEFT EYE: ICD-10-CM

## 2025-03-11 DIAGNOSIS — Z90.49 ACQUIRED ABSENCE OF OTHER SPECIFIED PARTS OF DIGESTIVE TRACT: Chronic | ICD-10-CM

## 2025-03-11 DIAGNOSIS — Z87.891 PERSONAL HISTORY OF NICOTINE DEPENDENCE: ICD-10-CM

## 2025-03-11 DIAGNOSIS — E87.70 FLUID OVERLOAD, UNSPECIFIED: ICD-10-CM

## 2025-03-11 LAB
ALBUMIN SERPL ELPH-MCNC: 4 G/DL — SIGNIFICANT CHANGE UP (ref 3.5–5.2)
ALP SERPL-CCNC: 68 U/L — SIGNIFICANT CHANGE UP (ref 30–115)
ALT FLD-CCNC: 9 U/L — SIGNIFICANT CHANGE UP (ref 0–41)
ANION GAP SERPL CALC-SCNC: 12 MMOL/L — SIGNIFICANT CHANGE UP (ref 7–14)
AST SERPL-CCNC: 11 U/L — SIGNIFICANT CHANGE UP (ref 0–41)
BASE EXCESS BLDV CALC-SCNC: 3.4 MMOL/L — HIGH (ref -2–3)
BASOPHILS # BLD AUTO: 0 K/UL — SIGNIFICANT CHANGE UP (ref 0–0.2)
BASOPHILS # BLD AUTO: 0.05 K/UL — SIGNIFICANT CHANGE UP (ref 0–0.2)
BASOPHILS NFR BLD AUTO: 0 % — SIGNIFICANT CHANGE UP (ref 0–1)
BASOPHILS NFR BLD AUTO: 0.2 % — SIGNIFICANT CHANGE UP (ref 0–1)
BILIRUB SERPL-MCNC: 1.3 MG/DL — HIGH (ref 0.2–1.2)
BUN SERPL-MCNC: 17 MG/DL — SIGNIFICANT CHANGE UP (ref 10–20)
CA-I SERPL-SCNC: 1.22 MMOL/L — SIGNIFICANT CHANGE UP (ref 1.15–1.33)
CALCIUM SERPL-MCNC: 8.5 MG/DL — SIGNIFICANT CHANGE UP (ref 8.4–10.5)
CHLORIDE SERPL-SCNC: 98 MMOL/L — SIGNIFICANT CHANGE UP (ref 98–110)
CO2 SERPL-SCNC: 28 MMOL/L — SIGNIFICANT CHANGE UP (ref 17–32)
CREAT SERPL-MCNC: 0.8 MG/DL — SIGNIFICANT CHANGE UP (ref 0.7–1.5)
EGFR: 71 ML/MIN/1.73M2 — SIGNIFICANT CHANGE UP
EGFR: 71 ML/MIN/1.73M2 — SIGNIFICANT CHANGE UP
EOSINOPHIL # BLD AUTO: 0 K/UL — SIGNIFICANT CHANGE UP (ref 0–0.7)
EOSINOPHIL # BLD AUTO: 0.02 K/UL — SIGNIFICANT CHANGE UP (ref 0–0.7)
EOSINOPHIL NFR BLD AUTO: 0 % — SIGNIFICANT CHANGE UP (ref 0–8)
EOSINOPHIL NFR BLD AUTO: 0.1 % — SIGNIFICANT CHANGE UP (ref 0–8)
FLUAV AG NPH QL: SIGNIFICANT CHANGE UP
FLUBV AG NPH QL: SIGNIFICANT CHANGE UP
GAS PNL BLDV: 130 MMOL/L — LOW (ref 136–145)
GAS PNL BLDV: SIGNIFICANT CHANGE UP
GAS PNL BLDV: SIGNIFICANT CHANGE UP
GLUCOSE SERPL-MCNC: 113 MG/DL — HIGH (ref 70–99)
HCO3 BLDV-SCNC: 30 MMOL/L — HIGH (ref 22–29)
HCT VFR BLD CALC: 35.6 % — LOW (ref 37–47)
HCT VFR BLD CALC: 36.5 % — LOW (ref 37–47)
HCT VFR BLDA CALC: 43 % — SIGNIFICANT CHANGE UP (ref 34.5–46.5)
HGB BLD CALC-MCNC: 14.3 G/DL — SIGNIFICANT CHANGE UP (ref 11.7–16.1)
HGB BLD-MCNC: 11.7 G/DL — LOW (ref 12–16)
HGB BLD-MCNC: 11.7 G/DL — LOW (ref 12–16)
IMM GRANULOCYTES NFR BLD AUTO: 1.3 % — HIGH (ref 0.1–0.3)
LYMPHOCYTES # BLD AUTO: 0.17 K/UL — LOW (ref 1.2–3.4)
LYMPHOCYTES # BLD AUTO: 0.86 K/UL — LOW (ref 1.2–3.4)
LYMPHOCYTES # BLD AUTO: 0.9 % — LOW (ref 20.5–51.1)
LYMPHOCYTES # BLD AUTO: 4 % — LOW (ref 20.5–51.1)
MAGNESIUM SERPL-MCNC: 1.9 MG/DL — SIGNIFICANT CHANGE UP (ref 1.8–2.4)
MCHC RBC-ENTMCNC: 28.7 PG — SIGNIFICANT CHANGE UP (ref 27–31)
MCHC RBC-ENTMCNC: 29.2 PG — SIGNIFICANT CHANGE UP (ref 27–31)
MCHC RBC-ENTMCNC: 32.1 G/DL — SIGNIFICANT CHANGE UP (ref 32–37)
MCHC RBC-ENTMCNC: 32.9 G/DL — SIGNIFICANT CHANGE UP (ref 32–37)
MCV RBC AUTO: 88.8 FL — SIGNIFICANT CHANGE UP (ref 81–99)
MCV RBC AUTO: 89.5 FL — SIGNIFICANT CHANGE UP (ref 81–99)
MONOCYTES # BLD AUTO: 1.84 K/UL — HIGH (ref 0.1–0.6)
MONOCYTES # BLD AUTO: 2.37 K/UL — HIGH (ref 0.1–0.6)
MONOCYTES NFR BLD AUTO: 11 % — HIGH (ref 1.7–9.3)
MONOCYTES NFR BLD AUTO: 9.5 % — HIGH (ref 1.7–9.3)
NEUTROPHILS # BLD AUTO: 16.56 K/UL — HIGH (ref 1.4–6.5)
NEUTROPHILS # BLD AUTO: 18.04 K/UL — HIGH (ref 1.4–6.5)
NEUTROPHILS NFR BLD AUTO: 85.3 % — HIGH (ref 42.2–75.2)
NRBC BLD AUTO-RTO: 0 /100 WBCS — SIGNIFICANT CHANGE UP (ref 0–0)
PCO2 BLDV: 50 MMHG — HIGH (ref 39–42)
PH BLDV: 7.38 — SIGNIFICANT CHANGE UP (ref 7.32–7.43)
PLATELET # BLD AUTO: 175 K/UL — SIGNIFICANT CHANGE UP (ref 130–400)
PLATELET # BLD AUTO: 190 K/UL — SIGNIFICANT CHANGE UP (ref 130–400)
PMV BLD: 10.2 FL — SIGNIFICANT CHANGE UP (ref 7.4–10.4)
PMV BLD: 10.5 FL — HIGH (ref 7.4–10.4)
PO2 BLDV: 46 MMHG — HIGH (ref 25–45)
POTASSIUM BLDV-SCNC: 3.5 MMOL/L — SIGNIFICANT CHANGE UP (ref 3.5–5.1)
POTASSIUM SERPL-MCNC: 3.6 MMOL/L — SIGNIFICANT CHANGE UP (ref 3.5–5)
POTASSIUM SERPL-SCNC: 3.6 MMOL/L — SIGNIFICANT CHANGE UP (ref 3.5–5)
PROT SERPL-MCNC: 6.2 G/DL — SIGNIFICANT CHANGE UP (ref 6–8)
RBC # BLD: 4.01 M/UL — LOW (ref 4.2–5.4)
RBC # BLD: 4.08 M/UL — LOW (ref 4.2–5.4)
RBC # FLD: 16.2 % — HIGH (ref 11.5–14.5)
RBC # FLD: 16.4 % — HIGH (ref 11.5–14.5)
RSV RNA NPH QL NAA+NON-PROBE: SIGNIFICANT CHANGE UP
SAO2 % BLDV: 73 % — SIGNIFICANT CHANGE UP (ref 67–88)
SARS-COV-2 RNA SPEC QL NAA+PROBE: SIGNIFICANT CHANGE UP
SODIUM SERPL-SCNC: 138 MMOL/L — SIGNIFICANT CHANGE UP (ref 135–146)
WBC # BLD: 19.41 K/UL — HIGH (ref 4.8–10.8)
WBC # BLD: 21.62 K/UL — HIGH (ref 4.8–10.8)
WBC # FLD AUTO: 19.41 K/UL — HIGH (ref 4.8–10.8)
WBC # FLD AUTO: 21.62 K/UL — HIGH (ref 4.8–10.8)

## 2025-03-11 PROCEDURE — 76700 US EXAM ABDOM COMPLETE: CPT

## 2025-03-11 PROCEDURE — 85025 COMPLETE CBC W/AUTO DIFF WBC: CPT

## 2025-03-11 PROCEDURE — 71045 X-RAY EXAM CHEST 1 VIEW: CPT

## 2025-03-11 PROCEDURE — 99223 1ST HOSP IP/OBS HIGH 75: CPT

## 2025-03-11 PROCEDURE — 85610 PROTHROMBIN TIME: CPT

## 2025-03-11 PROCEDURE — 99222 1ST HOSP IP/OBS MODERATE 55: CPT

## 2025-03-11 PROCEDURE — 93010 ELECTROCARDIOGRAM REPORT: CPT

## 2025-03-11 PROCEDURE — 71045 X-RAY EXAM CHEST 1 VIEW: CPT | Mod: 26

## 2025-03-11 PROCEDURE — 80053 COMPREHEN METABOLIC PANEL: CPT

## 2025-03-11 PROCEDURE — 99285 EMERGENCY DEPT VISIT HI MDM: CPT

## 2025-03-11 PROCEDURE — 84145 PROCALCITONIN (PCT): CPT

## 2025-03-11 PROCEDURE — 74018 RADEX ABDOMEN 1 VIEW: CPT

## 2025-03-11 PROCEDURE — 36415 COLL VENOUS BLD VENIPUNCTURE: CPT

## 2025-03-11 PROCEDURE — 94640 AIRWAY INHALATION TREATMENT: CPT

## 2025-03-11 PROCEDURE — 83735 ASSAY OF MAGNESIUM: CPT

## 2025-03-11 PROCEDURE — 97162 PT EVAL MOD COMPLEX 30 MIN: CPT | Mod: GP

## 2025-03-11 PROCEDURE — 84100 ASSAY OF PHOSPHORUS: CPT

## 2025-03-11 PROCEDURE — 92610 EVALUATE SWALLOWING FUNCTION: CPT | Mod: GN

## 2025-03-11 PROCEDURE — 99239 HOSP IP/OBS DSCHRG MGMT >30: CPT

## 2025-03-11 RX ORDER — IPRATROPIUM BROMIDE AND ALBUTEROL SULFATE .5; 2.5 MG/3ML; MG/3ML
3 SOLUTION RESPIRATORY (INHALATION) ONCE
Refills: 0 | Status: COMPLETED | OUTPATIENT
Start: 2025-03-11 | End: 2025-03-11

## 2025-03-11 RX ORDER — METHYLPREDNISOLONE ACETATE 80 MG/ML
80 INJECTION, SUSPENSION INTRA-ARTICULAR; INTRALESIONAL; INTRAMUSCULAR; SOFT TISSUE ONCE
Refills: 0 | Status: COMPLETED | OUTPATIENT
Start: 2025-03-11 | End: 2025-03-11

## 2025-03-11 RX ADMIN — IPRATROPIUM BROMIDE AND ALBUTEROL SULFATE 3 MILLILITER(S): .5; 2.5 SOLUTION RESPIRATORY (INHALATION) at 01:02

## 2025-03-11 RX ADMIN — METHYLPREDNISOLONE ACETATE 80 MILLIGRAM(S): 80 INJECTION, SUSPENSION INTRA-ARTICULAR; INTRALESIONAL; INTRAMUSCULAR; SOFT TISSUE at 19:58

## 2025-03-11 RX ADMIN — Medication 1 APPLICATION(S): at 11:24

## 2025-03-11 RX ADMIN — METOPROLOL SUCCINATE 25 MILLIGRAM(S): 50 TABLET, EXTENDED RELEASE ORAL at 05:36

## 2025-03-11 RX ADMIN — ENOXAPARIN SODIUM 40 MILLIGRAM(S): 100 INJECTION SUBCUTANEOUS at 07:03

## 2025-03-11 RX ADMIN — IPRATROPIUM BROMIDE AND ALBUTEROL SULFATE 3 MILLILITER(S): .5; 2.5 SOLUTION RESPIRATORY (INHALATION) at 14:30

## 2025-03-11 RX ADMIN — POLYETHYLENE GLYCOL 3350 17 GRAM(S): 17 POWDER, FOR SOLUTION ORAL at 05:35

## 2025-03-11 RX ADMIN — IPRATROPIUM BROMIDE AND ALBUTEROL SULFATE 3 MILLILITER(S): .5; 2.5 SOLUTION RESPIRATORY (INHALATION) at 08:12

## 2025-03-11 RX ADMIN — IPRATROPIUM BROMIDE AND ALBUTEROL SULFATE 3 MILLILITER(S): .5; 2.5 SOLUTION RESPIRATORY (INHALATION) at 19:59

## 2025-03-11 NOTE — H&P ADULT - HISTORY OF PRESENT ILLNESS
89-year-old female with past medical history of hypertension, hyperlipidemia, CHF, pulmonary hypertension, aortic stenosis, possible COPD was discharged yesterday 3/10 on 2L home oxygen, history of DVT not on anticoagulation, newly diagnosed esophageal mass presented for evaluation shortness of breath, desatting to 80s on 4L NC. Denies fever, cough sore throat, chills, nausea, vomiting, shortness of breath, abdominal pain, urinary complaints.   Patient was found to have Mediastinal Mass, Subcarinal/Esophgeal Mass Suspicious for Neoplasia and severe pulmonary hypertension on CTA Chest on recent admission, daughter refused EGD/EUS and further work up, per daughter patient would not want to pursue chemo even if found to have cancer. Family planing to opt for hospice. Patient was discharged on 3/10/2025 with home oxygen. Patient is DNR/DNI.     VITAL SIGNS:  BP: 110/65  Heart Rate: 68 /min  Respiration Rate: 18 /min  SpO2 (%): 98 % on mask, nonrebreather  Oxygen Therapy Flow (L/min)	10 L/min    LABS:   WBC: 21.62  Hb: 11.7  T. bili: 1.3    RVP negative    VBG: pH: 7.38  pCO2: 50  pO2: 46  HCO3: 30      CXR: looks similar to previous, no new changes    s/p duonebs and solumedrol 80mg IVP in ED.  89-year-old female with past medical history of hypertension, hyperlipidemia, CHF, pulmonary hypertension, aortic stenosis, possible COPD was discharged yesterday 3/10 on 2L home oxygen, history of DVT not on anticoagulation, newly diagnosed esophageal mass presented for evaluation shortness of breath, desatting to 80s on 4L NC. Denies fever, cough sore throat, chills, nausea, vomiting, shortness of breath, abdominal pain, urinary complaints.   Patient was found to have Mediastinal Mass, Subcarinal/Esophgeal Mass Suspicious for Neoplasia and severe pulmonary hypertension on CTA Chest on recent admission, daughter refused EGD/EUS and further work up, per daughter patient would not want to pursue chemo even if found to have cancer. Family planing to opt for hospice. Patient was discharged on 3/10/2025 with home oxygen. Patient is DNR/DNI.     Granddaughter at bedside states that she also noticed increased secretions and some gurgling sound.    VITAL SIGNS:  BP: 110/65  Heart Rate: 68 /min  Respiration Rate: 18 /min  SpO2 (%): 98 % on mask, nonrebreather  Oxygen Therapy Flow (L/min)	10 L/min    LABS:   WBC: 21.62  Hb: 11.7  T. bili: 1.3    RVP negative    VBG: pH: 7.38  pCO2: 50  pO2: 46  HCO3: 30      CXR: looks similar to previous, no new changes    s/p duonebs and solumedrol 80mg IVP in ED.

## 2025-03-11 NOTE — H&P ADULT - NSHPLABSRESULTS_GEN_ALL_CORE
LABS:                         11.7   21.62 )-----------( 175      ( 11 Mar 2025 20:00 )             35.6     03-11    138  |  98  |  17  ----------------------------<  113[H]  3.6   |  28  |  0.8    Ca    8.5      11 Mar 2025 08:31  Mg     1.9     03-11    TPro  6.2  /  Alb  4.0  /  TBili  1.3[H]  /  DBili  x   /  AST  11  /  ALT  9   /  AlkPhos  68  03-11      Urinalysis Basic - ( 11 Mar 2025 08:31 )    Color: x / Appearance: x / SG: x / pH: x  Gluc: 113 mg/dL / Ketone: x  / Bili: x / Urobili: x   Blood: x / Protein: x / Nitrite: x   Leuk Esterase: x / RBC: x / WBC x   Sq Epi: x / Non Sq Epi: x / Bacteria: x      RADIOLOGY, EKG & ADDITIONAL TESTS: Reviewed.

## 2025-03-11 NOTE — CONSULT NOTE ADULT - SUBJECTIVE AND OBJECTIVE BOX
CC: left shoulder arm pain    HPI:  89-year-old female with past medical history of hypertension, hyperlipidemia, CHF, pulmonary hypertension, aortic stenosis, COPD not on oxygen, history of DVT not on anticoagulation presents for evaluation of left shoulder to arm pain.  She denies any trauma or injury.  She denies any fever, cough sore throat, chills, nausea, vomiting, shortness of breath, abdominal pain, urinary complaints. Patient reports that left arm pain started 2-3 weeks ago but has progressively worsened since.  Per collateral obtained from daughter, she has been a little SOB the past few days. Per ED chart, daughter reported that patient is DNR/DNI.   (07 Mar 2025 23:35)    PERTINENT PM/SXH:   HTN (hypertension)    Hypercholesteremia    Heavy cigarette smoker      History of laparoscopic cholecystectomy    H/O: hysterectomy    History of surgery      FAMILY HISTORY:    None pertinent    ITEMS NOT CHECKED ARE NOT PRESENT    SOCIAL HISTORY:   Significant other/partner[ ]  Children[ ]  Anabaptism/Spirituality:  Substance hx:  [ ]   Tobacco hx:  [ ]   Alcohol hx: [ ]   Living Situation: [ x]Home  [ ]Long term care  [ ]Rehab [ ]Other  Home Services: [ ] HHA [ ] Visting RN [ ] Hospice  Occupation:  Home Opioid hx:  [ ] Y [ ] N [x ] I-Stop Reference No:    Reference #: [id number]    You have not added a JESUS number. Keeping your JESUS number(s) up to date on the My JESUS # tab will enable the separation of your prescriptions from others in the search results.      Prescription Information      PDI Filter:    PDI	Current Rx	Drug Type	Rx Written	Rx Dispensed	Drug	Quantity	Days Supply	Prescriber Name	Prescriber JESUS #	Payment Method	Dispenser  A	N	O	10/21/2024	11/02/2024	tramadol hcl 50 mg tablet	21	7	Michelle Mabry MD	[id number]	Medicare	[facility] #[id number]  A	N	O	10/21/2024	10/21/2024	tramadol hcl 50 mg tablet	21	7	Michelle Mabry MD	[id number]	Medicare	[facility] #[id number]  A	N	B	04/24/2024	04/24/2024	alprazolam 0.25 mg tablet	28	9	Shiva Chowdhury	[id number]	Insurance	[facility]  A	N	B	04/11/2024	04/11/2024	alprazolam 0.25 mg tablet	28	9	Shiva Chowdhury	[id number]	Insurance	[employer]  A	N	B	03/29/2024	03/29/2024	alprazolam 0.25 mg tablet	28	7	Shiva Chowdhury	[id number]	Insurance	[facility]  A	N	O	03/28/2024	03/28/2024	oxycodone hcl (ir) 5 mg tablet	2	1	Elida Mendoza	[id number]	Insurance	[facility]  A	N	O	03/14/2024	03/14/2024	oxycodone hcl (ir) 5 mg tablet	1	1	Cammie Turk MD	[id number]	Insurance	[facility]  A	N	O	03/12/2024	03/12/2024	oxycodone hcl (ir) 5 mg tablet	30	7	Eddy Tirado	[id number]	Insurance	[facility]     ADVANCE DIRECTIVES:     [ ] Full Code [ x] DNR  MOLST  [ ]  Living Will  [ ]   DECISION MAKER(s):  [ ] Health Care Proxy(s)  [ x] Surrogate(s)  [ ] Guardian           Name(s): Phone Number(s):  Matilda Miles      BASELINE (I)ADL(s) (prior to admission):    [place]: [ ]Total  [ ] Moderate [ ]Dependent  Palliative Performance Status Version 2:         %    http://npcrc.org/files/news/palliative_performance_scale_ppsv2.pdf    Allergies    No Known Allergies    Intolerances    MEDICATIONS  (STANDING):  albuterol/ipratropium for Nebulization 3 milliLiter(s) Nebulizer every 6 hours  amLODIPine   Tablet 5 milliGRAM(s) Oral at bedtime  atorvastatin 40 milliGRAM(s) Oral at bedtime  chlorhexidine 2% Cloths 1 Application(s) Topical daily  donepezil 10 milliGRAM(s) Oral at bedtime  enoxaparin Injectable 40 milliGRAM(s) SubCutaneous every 24 hours  metoprolol tartrate 25 milliGRAM(s) Oral two times a day  polyethylene glycol 3350 17 Gram(s) Oral two times a day  senna 2 Tablet(s) Oral at bedtime  traZODone 150 milliGRAM(s) Oral at bedtime    MEDICATIONS  (PRN):    PRESENT SYMPTOMS: [ ]Unable to obtain due to poor mentation   Source if other than patient:  [ ]Family   [ ]Team     Pain: [x ]yes [ ]no  ALL PAIN ASSESSMENT COMPONENTS WERE ASKED ABOUT UNLESS OTHERWISE NOTED  QOL impact -   Location - back pain  all other questions/pain descriptions asked, but not answered          Aggravating factors -  Quality -  Radiation -  Timing-  Severity (0-10 scale):  Minimal acceptable level (0-10 scale):     CPOT:    https://www.[facility].org/getattachment/[email or web address]/Critical-Care-Pain-Observation-Tool-(CPOT)    PAIN AD Score:   http://geriatrictoolkit.[email or web address]/cog/painad.pdf (press ctrl +  left click to view)    Dyspnea:                           [ x]None[ ]Mild [ ]Moderate [ ]Severe     Respiratory Distress Observation Scale (RDOS):   A score of 0 to 2 signifies little or no respiratory distress, 3 signifies mild distress, scores 4 to 6 indicate moderate distress, and scores greater than 7 signify severe distress  https://www.[facility].ca/sites/default/files/PDFS/358691-qmptpmnsxfa-qgrzvqrk-nuskhwtcnee-kyhov.pdf    Anxiety:                             [ ]None[ ]Mild [ ]Moderate [ ]Severe   Fatigue:                             [ ]None[ ]Mild [ ]Moderate [ ]Severe   Nausea:                             [x ]None[ ]Mild [ ]Moderate [ ]Severe   Loss of appetite:              [ ]None[ ]Mild [ ]Moderate [ ]Severe   Constipation:                    [ ]None[ ]Mild [ ]Moderate [ ]Severe    Other Symptoms:  [x ]All other review of systems negative     Palliative Performance Status Version 2:         30%    http://npcrc.org/files/news/palliative_performance_scale_ppsv2.pdf    PHYSICAL EXAM:  Vital Signs Last 24 Hrs  T(C): 37.7 (11 Mar 2025 16:11), Max: 37.7 (11 Mar 2025 16:11)  T(F): 99.8 (11 Mar 2025 16:11), Max: 99.8 (11 Mar 2025 16:11)  HR: 78 (11 Mar 2025 16:11) (77 - 85)  BP: 130/47 (11 Mar 2025 16:11) (130/47 - 152/68)  BP(mean): --  RR: 18 (11 Mar 2025 16:11) (18 - 18)  SpO2: 95% (11 Mar 2025 16:11) (92% - 95%)    Parameters below as of 11 Mar 2025 16:11  Patient On (Oxygen Delivery Method): nasal cannula     I&O's Summary      GENERAL:  [ ] No acute distress [ ]Lethargic  [ ]Unarousable  [ x]Verbal  [ ]Non-Verbal [ ]Cachexia    BEHAVIORAL/PSYCH:  [ ]Alert and Oriented x  [ ] Anxiety [ ] Delirium [ ] Agitation [x ] Calm   EYES: [ ] No scleral icterus [ ] Scleral icterus [ ] Closed  ENMT:  [ ]Dry mouth  [ ]No external oral lesions [ ] No external ear or nose lesions  CARDIOVASCULAR:  [ ]Regular [ ]Irregular [ ]Tachy [ ]Not Tachy  [ ]Geovanny [ ] Edema [ ] No edema  PULMONARY:  [ ]Tachypnea  [ ]Audible excessive secretions [ x] No labored breathing [ ] labored breathing  GASTROINTESTINAL: [ ]Soft  [ ]Distended  [ ]Not distended [ ]Non tender [ ]Tender  MUSCULOSKELETAL: [ ]No clubbing [ ] clubbing  [ ] No cyanosis [ ] cyanosis  NEUROLOGIC: [ ]No focal deficits  [ x]Follows commands  [ ]Does not follow commands  [x ]Cognitive impairment  [ ]Dysphagia  [ ]Dysarthria  [ ]Paresis   SKIN: [ ] Jaundiced [ x] Non-jaundiced [ ]Rash [ ]No Rash [ ] Warm [ ] Dry  MISC/LINES: [ ] ET tube [ ] Trach [ ]NGT/OGT [ ]PEG [ ]Good    LABS: reviewed by me                        11.7   19.41 )-----------( 190      ( 11 Mar 2025 08:31 )             36.5   03-11    138  |  98  |  17  ----------------------------<  113[H]  3.6   |  28  |  0.8    Ca    8.5      11 Mar 2025 08:31  Mg     1.9     03-11    TPro  6.2  /  Alb  4.0  /  TBili  1.3[H]  /  DBili  x   /  AST  11  /  ALT  9   /  AlkPhos  68  03-11      Urinalysis Basic - ( 11 Mar 2025 08:31 )    Color: x / Appearance: x / SG: x / pH: x  Gluc: 113 mg/dL / Ketone: x  / Bili: x / Urobili: x   Blood: x / Protein: x / Nitrite: x   Leuk Esterase: x / RBC: x / WBC x   Sq Epi: x / Non Sq Epi: x / Bacteria: x      RADIOLOGY & ADDITIONAL STUDIES: reviewed by me    < from: CT Abdomen and Pelvis w/ IV Cont (03.08.25 @ 14:37) >    IMPRESSION:  Since 4/7/2024:    1. No definite sites of neoplastic disease within abdomen and pelvis.  2. Unchanged bilateral adrenal nodules, measuring up to 1.8 cm on right   an 3.1 x 1.1 cm on left.    < end of copied text >    < from: CT Angio Chest PE Protocol w/ IV Cont (03.07.25 @ 18:54) >  No evidence of pulmonary embolism.    Mediastinal lymphadenopathy is noted. This includes increased soft tissue   density (4.3 x 2.3 cm) in thesubcarinal region suspicious for   lymphadenopathy and/or esophageal mass. Further investigation is   recommended.    The pulmonary artery measures 3.4 cm which may be seen with pulmonary   hypertension.    A callback was requested.    < end of copied text >      EKG: reviewed by me    < from: 12 Lead ECG (03.07.25 @ 16:33) >  Ventricular Rate 55 BPM    Atrial Rate 55 BPM    P-R Interval 216 ms    QRS Duration 70 ms    Q-T Interval 436 ms    QTC Calculation(Bazett) 417 ms    P Axis 77 degrees    R Axis 96 degrees    T Axis 69 degrees    Diagnosis Line Sinus bradycardia with 1st degree A-V block  Rightward axis  Anteroseptal infarct (cited on or before 05-MAR-2024)  Abnormal ECG    < end of copied text >      PROTEIN CALORIE MALNUTRITION PRESENT: [ ]mild [ ]moderate [ ]severe [ ]underweight [ ]morbid obesity  https://www.andeal.org/vault/2440/web/files/ONC/Table_Clinical%20Characteristics%20to%20Document%20Malnutrition-White%20JV%20et%20al%202012.pdf    Height (cm): 172.7 (03-07-25 @ 16:31), 167.6 (04-07-24 @ 13:05)  Weight (kg): 64.4 (03-07-25 @ 16:31), 65.771 (04-07-24 @ 19:17)  BMI (kg/m2): 21.6 (03-07-25 @ 16:31), 23.4 (04-07-24 @ 19:17)  [ ]PPSV2 < or = to 30% [ ]significant weight loss  [ ]poor nutritional intake  [ ]anasarca      [ ]Artificial Nutrition      Palliative Care Spiritual/Emotional Screening Tool Question  Severity (0-4):                    OR                    [ x] Unable to determine. Will assess at later time if appropriate.  Score of 2 or greater indicates recommendation of Chaplaincy and/or SW referral  Chaplaincy Referral: [ ] Yes [ ] Refused [ ] Following     Caregiver [place]:  [ ] Yes [ ] No    OR    [x ] Unable to determine. Will assess at later time if appropriate.  Social Work Referral [ ]  Patient and Family Centered Care Referral [ ]    Anticipatory Grief Present: [ ] Yes [ ] No    OR     [ x] Unable to determine. Will assess at later time if appropriate.  Social Work Referral [ ]  Patient and Family Centered Care Referral [ ]    Patient discussed with primary medical team MD  Palliative care education provided to patient and/or family

## 2025-03-11 NOTE — ED PROVIDER NOTE - PHYSICAL EXAMINATION
CONSTITUTIONAL: Elderly female; in no apparent distress.   EYES: PERRL; EOM intact.   CARDIOVASCULAR: Normal S1, S2; no murmurs, rubs, or gallops.   RESPIRATORY: Respiratory rate 18.  Diffuse bilateral crackles.  No accessory muscle use.  GI/: Normal bowel sounds; non-distended; non-tender; no palpable organomegaly.   MS: No calf swelling and tenderness.  No pitting edema to lower extremities.  SKIN: Normal for age and race; warm; dry; good turgor; no apparent lesions or exudate.   NEURO/PSYCH: A & O x 4; grossly unremarkable.

## 2025-03-11 NOTE — DISCHARGE NOTE PROVIDER - HOSPITAL COURSE
HPI:  89-year-old female with past medical history of hypertension, hyperlipidemia, CHF, pulmonary hypertension, aortic stenosis, possible COPD not on oxygen, history of DVT not on anticoagulation presented for evaluation of left shoulder to arm pain.  She denied any trauma or injury, fever, cough sore throat, chills, nausea, vomiting, shortness of breath, abdominal pain, urinary complaints. Patient reported that left arm pain started 2-3 weeks ago but has progressively worsened since.  Per collateral obtained from daughter, she has been a little SOB the past few days. Per ED chart, daughter reported that patient is DNR/DNI.    In ED, T 97.7, /63, HR 56, RR 18, SpO2 94% on RA.   Labs: Hgb 11.9, Trop 13--> 14, Mg 1.7, Pro-BNP 1888  EKG: Sinus bradycardia, 1st degree AV block, RAD     CTA Chest: No evidence of pulmonary embolism.  Mediastinal lymphadenopathy is noted. This includes increased soft tissue density (4.3 x 2.3 cm) in the subcarinal region suspicious for lymphadenopathy and/or esophageal mass. Further investigation is recommended. The pulmonary artery measures 3.4 cm which may be seen with pulmonary hypertension.    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 Therefore they do not want to undergo a high risk procedure   - CT chest showing pleural effusions and trivial pericardial effusions  - sp Iv Lasix 40 mg: then lasix was held - on 2 L nc --> will likely need home O2   - ekg no ST elevations.   - Keep Mg >2, K >4      #RLE tenderness  - LE duplex: no DVT     #Constipation    - c/w Bowel Regimen     #Hx HTN  and  HLD   - Lopressor,  Amlodipine, Lipitor     #Hx  Cognitive Decline -  Donepezil  - patient has short term memory forgetfulness. wanders at night  - Has aids at home      #Reported history COPD but not officially diagnosed.   - had nebs q6h   - on DC: home beztri  - patient did not officially get PFT outpatient, can f/u outpatient    Discussion of discharge plan of care, including discharge diagnoses, medication reconciliation, and follow-ups was conducted with Dr. Hansen on 3/11/25 and discharge was approved.   HPI:  89-year-old female with past medical history of hypertension, hyperlipidemia, CHF, pulmonary hypertension, aortic stenosis, possible COPD not on oxygen, history of DVT not on anticoagulation presented for evaluation of left shoulder to arm pain.  She denied any trauma or injury, fever, cough sore throat, chills, nausea, vomiting, shortness of breath, abdominal pain, urinary complaints. Patient reported that left arm pain started 2-3 weeks ago but has progressively worsened since.  Per collateral obtained from daughter, she has been a little SOB the past few days. Per ED chart, daughter reported that patient is DNR/DNI.    In ED, T 97.7, /63, HR 56, RR 18, SpO2 94% on RA.   Labs: Hgb 11.9, Trop 13--> 14, Mg 1.7, Pro-BNP 1888  EKG: Sinus bradycardia, 1st degree AV block, RAD     CTA Chest: No evidence of pulmonary embolism.  Mediastinal lymphadenopathy is noted. This includes increased soft tissue density (4.3 x 2.3 cm) in the subcarinal region suspicious for lymphadenopathy and/or esophageal mass. Further investigation is recommended. The pulmonary artery measures 3.4 cm which may be seen with pulmonary hypertension.    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 Therefore they do not want to undergo a high risk procedure   - CT chest showing pleural effusions and trivial pericardial effusions  - sp Iv Lasix 40 mg: then lasix was held - on 2 L nc --> going home with home O2  - ekg no ST elevations.   - Keep Mg >2, K >4      #RLE tenderness  - LE duplex: no DVT     #Constipation    - c/w Bowel Regimen     #Hx HTN  and  HLD   - Lopressor,  Amlodipine, Lipitor     #Hx  Cognitive Decline -  Donepezil  - patient has short term memory forgetfulness. wanders at night  - Has aids at home      #Reported history COPD but not officially diagnosed.   - had nebs q6h   - on DC: home beztri  - patient did not officially get PFT outpatient, can f/u outpatient    Discussion of discharge plan of care, including discharge diagnoses, medication reconciliation, and follow-ups was conducted with Dr. Hansen on 3/11/25 and discharge was approved.   HPI:  89-year-old female with past medical history of hypertension, hyperlipidemia, CHF, pulmonary hypertension, aortic stenosis, possible COPD not on oxygen, history of DVT not on anticoagulation presented for evaluation of left shoulder to arm pain.  She denied any trauma or injury, fever, cough sore throat, chills, nausea, vomiting, shortness of breath, abdominal pain, urinary complaints. Patient reported that left arm pain started 2-3 weeks ago but has progressively worsened since.  Per collateral obtained from daughter, she has been a little SOB the past few days. Per ED chart, daughter reported that patient is DNR/DNI.    In ED, T 97.7, /63, HR 56, RR 18, SpO2 94% on RA.   Labs: Hgb 11.9, Trop 13--> 14, Mg 1.7, Pro-BNP 1888  EKG: Sinus bradycardia, 1st degree AV block, RAD     CTA Chest: No evidence of pulmonary embolism.  Mediastinal lymphadenopathy is noted. This includes increased soft tissue density (4.3 x 2.3 cm) in the subcarinal region suspicious for lymphadenopathy and/or esophageal mass. Further investigation is recommended. The pulmonary artery measures 3.4 cm which may be seen with pulmonary hypertension.    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 Therefore they do not want to undergo a high risk procedure   - CT chest showing pleural effusions and trivial pericardial effusions  - sp Iv Lasix 40 mg: then lasix was held - on 2 L nc --> going home with home O2  - ekg no ST elevations.   - Keep Mg >2, K >4  - Per fam: not a candidate for surgery for AS  - Lasix has been held here after initial diuresis. Will follow up with outpt PCP about need for lasix       #RLE tenderness  - LE duplex: no DVT     #Constipation    - c/w Bowel Regimen     #Hx HTN  and  HLD   - Lopressor,  Amlodipine, Lipitor     #Hx  Cognitive Decline -  Donepezil  - patient has short term memory forgetfulness. wanders at night  - Has aids at home      #Reported history COPD but not officially diagnosed.   - had nebs q6h   - on DC: home beztri  - patient did not officially get PFT outpatient, can f/u outpatient    - Sending hospice referral per discussion with pt's daughter.     Discussion of discharge plan of care, including discharge diagnoses, medication reconciliation, and follow-ups was conducted with Dr. Hansen on 3/11/25 and discharge was approved.   HPI:  89-year-old female with past medical history of hypertension, hyperlipidemia, CHF, pulmonary hypertension, aortic stenosis, possible COPD not on oxygen, history of DVT not on anticoagulation presented for evaluation of left shoulder to arm pain.  She denied any trauma or injury, fever, cough sore throat, chills, nausea, vomiting, shortness of breath, abdominal pain, urinary complaints. Patient reported that left arm pain started 2-3 weeks ago but has progressively worsened since.  Per collateral obtained from daughter, she has been a little SOB the past few days. Per ED chart, daughter reported that patient is DNR/DNI.    In ED, T 97.7, /63, HR 56, RR 18, SpO2 94% on RA.   Labs: Hgb 11.9, Trop 13--> 14, Mg 1.7, Pro-BNP 1888  EKG: Sinus bradycardia, 1st degree AV block, RAD     CTA Chest: No evidence of pulmonary embolism.  Mediastinal lymphadenopathy is noted. This includes increased soft tissue density (4.3 x 2.3 cm) in the subcarinal region suspicious for lymphadenopathy and/or esophageal mass. Further investigation is recommended. The pulmonary artery measures 3.4 cm which may be seen with pulmonary hypertension.    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 Therefore they do not want to undergo a high risk procedure   - CT chest showing pleural effusions and trivial pericardial effusions  - sp Iv Lasix 40 mg: then lasix was held - on 2 L nc --> going home with home O2  - ekg no ST elevations.   - Keep Mg >2, K >4  - Per fam: not a candidate for surgery for AS  - Lasix has been held here after initial diuresis. Will follow up with outpt PCP about need for lasix   - As family/daughter does not wish further work up for the mediastinal mass, hospice referral made. Palliative on the case    #Leukocytosis likely reactive  - wbc triple in less than 24hrs  - Pt is asymptomatic  - Will watch    #RLE tenderness  - LE duplex: no DVT     #Constipation    - c/w Bowel Regimen     #Hx HTN  and  HLD   - Lopressor,  Amlodipine, Lipitor     #Hx  Cognitive Decline -  Donepezil  - patient has short term memory forgetfulness. wanders at night  - Has aids at home      #Reported history COPD but not officially diagnosed.   - had nebs q6h   - on DC: home beztri  - patient did not officially get PFT outpatient, can f/u outpatient    - Sending hospice referral per discussion with pt's daughter.     Discussion of discharge plan of care, including discharge diagnoses, medication reconciliation, and follow-ups was conducted with Dr. Hansen on 3/11/25 and discharge was approved.

## 2025-03-11 NOTE — DISCHARGE NOTE NURSING/CASE MANAGEMENT/SOCIAL WORK - FINANCIAL ASSISTANCE
[facility] provides services at a reduced cost to those who are determined to be eligible through [facility]’s financial assistance program. Information regarding [facility]’s financial assistance program can be found by going to [email or web address] or by calling [phone].

## 2025-03-11 NOTE — H&P ADULT - ATTENDING COMMENTS
Patient seen on 03/11/25.        89-year-old female with past medical history of hypertension, hyperlipidemia, CHF, pulmonary hypertension, aortic stenosis, possible COPD was discharged yesterday 3/10 on 2L home oxygen, history of DVT not on anticoagulation, newly diagnosed esophageal mass presented for evaluation shortness of breath, desatting to 80s on 4L NC. Denies fever, cough sore throat, chills, nausea, vomiting, shortness of breath, abdominal pain, urinary complaints.       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 compared to CXR from 3/7, right plural effusion seems improved but increased right basilar opacity noted. follow official read.  - s/p duonebs and solumedrol in ED  - c/w duonebs q6  - start solumedrol 40mg ivp daily  - Abx: start Rocephin,  follow cultures.  - As family/daughter does not wish further work up for the mediastinal mass, hospice referral was made 3/10 . Palliative was also on the case  - Aspiration precautions, frequent suctioning of oral secretions  - keep NPO except meds for now, pending S/S eval  - hold off on diuretics for considering patient made npo tonight.  monitor for volume overload, may need diuretics during hospital stay.    #Constipation    - c/w senna/miralax  - xray KUB ordered.    #Hx HTN  and  HLD   - Lopressor,  Amlodipine, Lipitor     #Hx  Cognitive Decline -  Donepezil  - patient has short term memory forgetfulness. wanders at night  - Has aides at home      #MISC  - DVT PPx: SCD  - GI PPx:PROTONIX  - Diet: NPO except meds for now, pending S/S eval;  dash/tlc  diet if cleared by S/S  - Activity: AAT  - Labs: ordered  - Code:  DNR/DNI

## 2025-03-11 NOTE — CONSULT NOTE ADULT - CONVERSATION DETAILS
Spoke with patient's daughter Matilda over the phone. Palliative care introduced. She was able to provide a medical history and hospital course and noted that she did not wish to pursue biopsy or workup of the mediastinal mass. She already spoke with hospice. She noted the patient has 24 hour aides at home and was very interested in hospice, but wanted to make sure she can keep the patient's aides. If she can keep the aides, she will likely do hospice. If not, she will likely continue current management. Will follow up after she speaks further with hospice.

## 2025-03-11 NOTE — ED PROVIDER NOTE - OBJECTIVE STATEMENT
89 years old female history of hypertension, dementia, COPD, aortic Stenosis, congestive heart failure recently found mediastinal mass on home O2 presents complaining of worsening shortness of breath and productive coughing for 1 day.  Patient was just discharged from hospital few hours ago.  During last hospitalization, patient found to have mediastinal mass and family and patient opted out of having workup of the mass.  Daughter noted patient started having productive coughing and shortness of breath earlier today but decided to bring patient home.  Patient was having trouble to breathe and saturation to high 70s on 2 L NC so daughter brought patient back to ED for evaluation.  Otherwise no headache, chills, fever, chest pain, abdominal pain, and legs pain / swelling.

## 2025-03-11 NOTE — DISCHARGE NOTE NURSING/CASE MANAGEMENT/SOCIAL WORK - NSDCPEFALRISK_GEN_ALL_CORE
For information on Fall & Injury Prevention, visit: [email or web address] OR  [email or web address] OR  https://www.cdc.gov/steadi/patient.html

## 2025-03-11 NOTE — DISCHARGE NOTE NURSING/CASE MANAGEMENT/SOCIAL WORK - PATIENT PORTAL LINK FT
You can access the FollowMyHealth Patient Portal offered by [facility] by registering at the following website: [email or web address]. By joining [facility]’s FollowMyHealth portal, you will also be able to view your health information using other applications (apps) compatible with our system.

## 2025-03-11 NOTE — DISCHARGE NOTE PROVIDER - NSDCMRMEDTOKEN_GEN_ALL_CORE_FT
amLODIPine 5 mg oral tablet: 1.5 tab(s) orally once a day (at bedtime)  Breztri Aerosphere 160 mcg-9 mcg-4.8 mcg/inh inhalation aerosol: 1 puff(s) inhaled 2 times a day  donepezil 10 mg oral tablet: 1 tab(s) orally once a day  Lipitor 40 mg oral tablet: 1 tab(s) orally once a day  metoprolol: 25 milligram(s) orally 2 times a day  polyethylene glycol 3350 oral powder for reconstitution: 17 gram(s) orally 2 times a day  Retaine MGD ophthalmic emulsion: 1 application to each affected eye once a day (at bedtime)  senna leaf extract oral tablet: 2 tab(s) orally once a day (at bedtime)  traZODone 150 mg oral tablet: 0.5 orally once a day (at bedtime)

## 2025-03-11 NOTE — CHART NOTE - NSCHARTNOTEFT_GEN_A_CORE
Family and patient decline EGD as they would not pursue further treatment, re-call [facility] as needed

## 2025-03-11 NOTE — DISCHARGE NOTE PROVIDER - NSDCCPCAREPLAN_GEN_ALL_CORE_FT
PRINCIPAL DISCHARGE DIAGNOSIS  Diagnosis: Mediastinal lymphadenopathy  Assessment and Plan of Treatment: You came to the hospital with left arm and shoulder pain. Your cardiac enzymes were negative. You also had an echocardiogram which showed worsening of the pressure in your lung vasculature and you have aortic stenosis. You were also found to have lymph node enlargement and a concern for a mass in the esophagus. Per discussions with your family and the gastroenterologists, you decided not to undergo further testing of this mass.   You were also found to have some fluids in your lungs and were given a medication to help get rid of some of the fluid.  Please followup with your primary care doctor within 1-2 weeks of discharge. If you have any new or recurrent symptoms, please call your doctor or go to the ED        SECONDARY DISCHARGE DIAGNOSES  Diagnosis: Arm pain  Assessment and Plan of Treatment:     Diagnosis: Abnormal EKG  Assessment and Plan of Treatment:     Diagnosis: Elevated brain natriuretic peptide (BNP) level  Assessment and Plan of Treatment:     Diagnosis: Mediastinal lymphadenopathy  Assessment and Plan of Treatment:

## 2025-03-11 NOTE — CONSULT NOTE ADULT - ASSESSMENT
89-year-old female with past medical history of hypertension, hyperlipidemia, CHF, pulmonary hypertension, aortic stenosis, COPD not on oxygen, history of DVT not on anticoagulation presents for evaluation of left shoulder to arm pain.  Patient found to have a mediastinal mass on arrival.  Family noted they didn't want pursue workup or treatment of the mass concerning for malignancy.  Palliative care consulted for [facility].    Spoke with patient's daughter Matilda over the phone. Palliative care introduced. She was able to provide a medical history and hospital course and noted that she did not wish to pursue biopsy or workup of the mediastinal mass. She already spoke with hospice. She noted the patient has 24 hour aides at home and was very interested in hospice, but wanted to make sure she can keep the patient's aides. If she can keep the aides, she will likely do hospice. If not, she will likely continue current management. Will follow up after she speaks further with hospice.    Plan  -DNR/DNI  -now workup of mediastinal mass  -hospice consulted  -follow up hospice  -will follow for now    Education about palliative care provided to patient/family.  See Recs below.    Please call [phone] with questions or concerns 24/7.    89-year-old female with past medical history of hypertension, hyperlipidemia, CHF, pulmonary hypertension, aortic stenosis, COPD not on oxygen, history of DVT not on anticoagulation presents for evaluation of left shoulder to arm pain.  Patient found to have a mediastinal mass on arrival.  Family noted they didn't want pursue workup or treatment of the mass concerning for malignancy.  Palliative care consulted for [facility].    Spoke with patient's daughter Matilda over the phone. Palliative care introduced. She was able to provide a medical history and hospital course and noted that she did not wish to pursue biopsy or workup of the mediastinal mass. She already spoke with hospice. She noted the patient has 24 hour aides at home and was very interested in hospice, but wanted to make sure she can keep the patient's aides. If she can keep the aides, she will likely do hospice. If not, she will likely continue current management. Will follow up after she speaks further with hospice.    Plan  -DNR/DNI  -no workup of mediastinal mass  -hospice consulted  -follow up hospice  -will follow for now    Education about palliative care provided to patient/family.  See Recs below.    Please call [phone] with questions or concerns 24/7.

## 2025-03-11 NOTE — PATIENT PROFILE ADULT - FALL HARM RISK - HARM RISK INTERVENTIONS

## 2025-03-11 NOTE — H&P ADULT - NSHPPHYSICALEXAM_GEN_ALL_CORE
PHYSICAL EXAM:  GENERAL: NAD, well-groomed, well-developed  HEAD:  Atraumatic, Normocephalic  EYES: EOMI, PERRLA, conjunctiva and sclera clear  ENMT: No tonsillar erythema, exudates, or enlargement; Moist mucous membranes  NECK: Supple, No JVD, Normal thyroid  HEART: Regular rate and rhythm; No murmurs, rubs, or gallops  RESPIRATORY: CTA B/L, No W/R/R  ABDOMEN: Soft, Nontender, Nondistended; Bowel sounds present  NEUROLOGY: A&Ox3, nonfocal, moving all extremities  EXTREMITIES:  2+ Peripheral Pulses, No clubbing, cyanosis, or edema  SKIN: warm, dry, normal color, no rash or abnormal lesions PHYSICAL EXAM:  GENERAL: NAD, well-groomed, well-developed  EYES: EOMI, PERRLA, conjunctiva and sclera clear  HEART: Regular rate and rhythm; No murmurs, rubs, or gallops  RESPIRATORY: CTA B/L, bilateral expiratory crackles likely from upper airway secretions as similar crackles noticed from mouth without stethoscope.  ABDOMEN: Soft, Nontender, Nondistended; Bowel sounds present  NEUROLOGY: A&Ox2, nonfocal, moving all extremities  EXTREMITIES:  2+ Peripheral Pulses, No clubbing, cyanosis, or edema  SKIN: warm, dry, normal color, no rash or abnormal lesions

## 2025-03-11 NOTE — PROGRESS NOTE ADULT - ASSESSMENT
89-year-old female with past medical history of hypertension, hyperlipidemia, CHF, pulmonary hypertension, aortic stenosis, COPD not on oxygen, history of DVT not on anticoagulation presents for evaluation of left shoulder to arm pain.  She denies any trauma or injury. DNR/DNI, daughter providing additional history. Admitted to medicine for further management.     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 Therefore they do not want to undergo a high risk procedure   - CT chest showing pleural effusions and trivial pericardial effusions  - sp Iv Lasix 40 mg: then lasix was held - on 2 L nc --> will need home O2  - ekg no ST elevations.   - Keep Mg >2, K >4    #Leukocytosis  - WBC 19  - Afebrile  - Monitor for signs of infection     #RLE tenderness  - LE duplex: no DVT     #Constipation    - c/w Bowel Regimen     #Hx HTN  and  HLD   - Lopressor,  Amlodipine, Lipitor     #Hx  Cognitive Decline -  Donepezil  - patient has short term memory forgetfulness. Wanders at night  - Delirium precautions such as regulating sleep wake cycle, constant reorientation by staff, opening blinds during the day, out of bed to chair as tolerated, avoiding medications that can worsen delirium such as anticholinergics, antihistamines, benzodiazapines, opiods.  - Has aids at home      #Reported history COPD but not officially diagnosed.   - had nebs q6h   - on DC: home beztri  - patient did not officially get PFT outpatient, can f/u outpatient    Diet: DASH/TLC  DVT ppx: Lovenox  GI ppx: NA  Dispo: Needs Home O2    Pending:  - Possible DC  - Needs Home O2  - Leukocytosis: monitor for signs of infection    89-year-old female with past medical history of hypertension, hyperlipidemia, CHF, pulmonary hypertension, aortic stenosis, COPD not on oxygen, history of DVT not on anticoagulation presents for evaluation of left shoulder to arm pain.  She denies any trauma or injury. DNR/DNI, daughter providing additional history. Admitted to medicine for further management.     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 Therefore they do not want to undergo a high risk procedure   - Palliative consulted  - CT chest showing pleural effusions and trivial pericardial effusions  - sp Iv Lasix 40 mg: then lasix was held - on 2 L nc --> will need home O2  - ekg no ST elevations.   - Keep Mg >2, K >4  - HF consulted    #Leukocytosis  - WBC 19  - Afebrile  - Monitor for signs of infection     #RLE tenderness  - LE duplex: no DVT     #Constipation    - c/w Bowel Regimen     #Hx HTN  and  HLD   - Lopressor,  Amlodipine, Lipitor     #Hx  Cognitive Decline -  Donepezil  - patient has short term memory forgetfulness. Wanders at night  - Delirium precautions such as regulating sleep wake cycle, constant reorientation by staff, opening blinds during the day, out of bed to chair as tolerated, avoiding medications that can worsen delirium such as anticholinergics, antihistamines, benzodiazapines, opiods.  - Has aids at home      #Reported history COPD but not officially diagnosed.   - had nebs q6h   - on DC: home beztri  - patient did not officially get PFT outpatient, can f/u outpatient    Diet: DASH/TLC  DVT ppx: Lovenox  GI ppx: NA  Dispo: Needs Home O2    Pending:  - Possible DC  - Needs Home O2  - Leukocytosis: monitor for signs of infection   - f/u HF  - f/u Palliative

## 2025-03-11 NOTE — ED ADULT TRIAGE NOTE - CHIEF COMPLAINT QUOTE
Pt c/o SOB from home. Pt was discharged x 1 hour ago and was on 4L on NC satting 87%. Pt placed on nonrebreather in field and saturating 97%. PMH CHF

## 2025-03-11 NOTE — H&P ADULT - ASSESSMENT
89-year-old female with past medical history of hypertension, hyperlipidemia, CHF, pulmonary hypertension, aortic stenosis, possible COPD was discharged yesterday 3/10 on 2L home oxygen, history of DVT not on anticoagulation, newly diagnosed esophageal mass presented for evaluation shortness of breath, desatting to 80s on 4L NC. Denies fever, cough sore throat, chills, nausea, vomiting, shortness of breath, abdominal pain, urinary complaints.       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 Palliative was also on the case    #Constipation    - c/w senna/miralax    #Hx HTN  and  HLD   - Lopressor,  Amlodipine, Lipitor     #Hx  Cognitive Decline -  Donepezil  - patient has short term memory forgetfulness. wanders at night  - Has aides at home      #MISC  - DVT PPx: SCD  - GI PPx:PROTONIX  - Diet: dash/tlc  - Activity: AAT  - Labs: ordered  - Code:  DNR/DNI  - Dispo: TBD   89-year-old female with past medical history of hypertension, hyperlipidemia, CHF, pulmonary hypertension, aortic stenosis, possible COPD was discharged yesterday 3/10 on 2L home oxygen, history of DVT not on anticoagulation, newly diagnosed esophageal mass presented for evaluation shortness of breath, desatting to 80s on 4L NC. Denies fever, cough sore throat, chills, nausea, vomiting, shortness of breath, abdominal pain, urinary complaints.       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 compared to CXR from 3/7, right plural effusion seems improved but increased right basilar opacity noted. follow official read.  - s/p duonebs and solumedrol in ED  - c/w duonebs q6  - start solumedrol 40mg ivp daily  - Abx: start Rocephin,  follow cultures.  - As family/daughter does not wish further work up for the mediastinal mass, hospice referral was made 3/10 . Palliative was also on the case  - Aspiration precautions, frequent suctioning of oral secretions  - keep NPO except meds for now, pending S/S eval  - hold off on diuretics for considering patient made npo tonight.  monitor for volume overload, may need diuretics during hospital stay.    #Constipation    - c/w senna/miralax  - xray KUB ordered.    #Hx HTN  and  HLD   - Lopressor,  Amlodipine, Lipitor     #Hx  Cognitive Decline -  Donepezil  - patient has short term memory forgetfulness. wanders at night  - Has aides at home      #MISC  - DVT PPx: SCD  - GI PPx:PROTONIX  - Diet: NPO except meds for now, pending S/S eval;  dash/tlc  diet if cleared by S/S  - Activity: AAT  - Labs: ordered  - Code:  DNR/DNI  - Dispo: TBD

## 2025-03-11 NOTE — ED PROVIDER NOTE - CLINICAL SUMMARY MEDICAL DECISION MAKING FREE TEXT BOX
89-year-old presented for evaluation of shortness of breath and productive cough.  Patient returned to the hospital she was having worsening shortness of breath.  Will admit to hospital for further evaluation and care.

## 2025-03-11 NOTE — ED ADULT NURSE NOTE - OBJECTIVE STATEMENT
Pt c/o SOB from home. Pt was discharged x 1 PTA. Family states pt was on 4L on NC satting 87% at home. Pt placed on nonrebreather by EMS  - saturating 97%.

## 2025-03-11 NOTE — DISCHARGE NOTE NURSING/CASE MANAGEMENT/SOCIAL WORK - NSDCFUADDAPPT_GEN_ALL_CORE_FT
The family will follow up with their outpatient cardiologist and gastroenterologist for further workup of esophageal mass and aortic stenosis/Pulm Htn.

## 2025-03-11 NOTE — PROGRESS NOTE ADULT - SUBJECTIVE AND OBJECTIVE BOX
Hospital Day:  2d    Subjective:    Patient is a 89y old  Female who presents with a chief complaint of   This morning patient is lying in bed comfortably. he reports no new complaints, including SOB, chest pain, abdominal pain, nausea, vomiting, dysuria, constipation, or diarrhea.      Past Medical Hx:   HTN (hypertension)    Hypercholesteremia    Heavy cigarette smoker      Past Sx:  History of laparoscopic cholecystectomy    H/O: hysterectomy    History of surgery      Allergies:  No Known Allergies    Current Meds:   Standng Meds:  albuterol/ipratropium for Nebulization 3 milliLiter(s) Nebulizer every 6 hours  amLODIPine   Tablet 5 milliGRAM(s) Oral at bedtime  atorvastatin 40 milliGRAM(s) Oral at bedtime  donepezil 10 milliGRAM(s) Oral at bedtime  enoxaparin Injectable 40 milliGRAM(s) SubCutaneous every 24 hours  furosemide   Injectable 20 milliGRAM(s) IV Push daily  metoprolol tartrate 25 milliGRAM(s) Oral two times a day  polyethylene glycol 3350 17 Gram(s) Oral two times a day  senna 2 Tablet(s) Oral at bedtime  traZODone 150 milliGRAM(s) Oral at bedtime    PRN Meds:    HOME MEDICATIONS:  amLODIPine 5 mg oral tablet: 1.5 tab(s) orally once a day (at bedtime)  Breztri Aerosphere 160 mcg-9 mcg-4.8 mcg/inh inhalation aerosol: 1 puff(s) inhaled 2 times a day  donepezil 10 mg oral tablet: 1 tab(s) orally once a day  Lipitor 40 mg oral tablet: 1 tab(s) orally once a day  metoprolol: 25 milligram(s) orally 2 times a day  polyethylene glycol 3350 oral powder for reconstitution: 17 gram(s) orally 2 times a day  Retaine MGD ophthalmic emulsion: 1 application to each affected eye once a day (at bedtime)  senna leaf extract oral tablet: 2 tab(s) orally once a day (at bedtime)  traZODone 150 mg oral tablet: 0.5 orally once a day (at bedtime)      Vital Signs:   T(F): 97.5 (03-09-25 @ 08:00), Max: 98 (03-08-25 @ 16:19)  HR: 60 (03-09-25 @ 08:00) (53 - 67)  BP: 164/53 (03-09-25 @ 08:00) (138/63 - 182/68)  RR: 18 (03-09-25 @ 08:00) (18 - 19)  SpO2: 97% (03-09-25 @ 08:00) (83% - 98%)        Physical Exam:   GENERAL: NAD  HEENT: NCAT  CHEST/LUNG: CTAB  HEART: Regular rate and rhythm; s1 s2 appreciated, No murmurs, rubs, or gallops  ABDOMEN: Soft, Nontender, Nondistended; Bowel sounds present  EXTREMITIES: No LE edema b/l RLE pain  SKIN: no rashes, no new lesions  NERVOUS SYSTEM:  Alert & Oriented   LINES/CATHETERS:        Labs:                         11.5   7.55  )-----------( 179      ( 08 Mar 2025 07:03 )             35.8       08 Mar 2025 07:03    140    |  103    |  10     ----------------------------<  88     3.8     |  27     |  0.6      Ca    8.3        08 Mar 2025 07:03  Mg     2.0       08 Mar 2025 07:03    TPro  6.3    /  Alb  4.2    /  TBili  0.6    /  DBili  x      /  AST  11     /  ALT  9      /  AlkPhos  71     07 Mar 2025 18:19                    Urinalysis Basic - ( 08 Mar 2025 07:03 )    Color: x / Appearance: x / SG: x / pH: x  Gluc: 88 mg/dL / Ketone: x  / Bili: x / Urobili: x   Blood: x / Protein: x / Nitrite: x   Leuk Esterase: x / RBC: x / WBC x   Sq Epi: x / Non Sq Epi: x / Bacteria: x                Assessment and Plan:   
Patient is a 89-year-old female with a PMHx of hypertension, hyperlipidemia, CHFpEF, severe pulmonary hypertension, mod  aortic stenosis, COPD not on oxygen, history of DVT not on anticoagulation presents for evaluation of left shoulder to arm pain.  Admitted to medicine for AHRF work up. CT revealed mediastinal mass. [facility] consulted for possible EUS guided biopsy.      PAST MEDICAL & SURGICAL HISTORY:  HTN (hypertension)  Hypercholesteremia  Heavy cigarette smoker  History of laparoscopic cholecystectomy  H/O: hysterectomy  History of surgery LEFT EYE CATARACT EXTRACTION WITH LENS  IMPLANT      Home Medications:  amLODIPine 5 mg oral tablet: 1.5 tab(s) orally once a day (at bedtime) (07 Apr 2024 23:20)  Breztri Aerosphere 160 mcg-9 mcg-4.8 mcg/inh inhalation aerosol: 1 puff(s) inhaled 2 times a day (08 Mar 2025 00:42)  donepezil 10 mg oral tablet: 1 tab(s) orally once a day (06 Mar 2024 06:57)  Lipitor 40 mg oral tablet: 1 tab(s) orally once a day (27 May 2022 08:49)  metoprolol: 25 milligram(s) orally 2 times a day (08 Mar 2025 00:39)  polyethylene glycol 3350 oral powder for reconstitution: 17 gram(s) orally 2 times a day (11 Mar 2024 11:23)  Retaine MGD ophthalmic emulsion: 1 application to each affected eye once a day (at bedtime) (08 Mar 2025 00:43)  senna leaf extract oral tablet: 2 tab(s) orally once a day (at bedtime) (11 Mar 2024 11:23)  traZODone 150 mg oral tablet: 0.5 orally once a day (at bedtime) (07 Apr 2024 23:23)      MEDICATIONS  (STANDING):  albuterol/ipratropium for Nebulization 3 milliLiter(s) Nebulizer every 6 hours  amLODIPine   Tablet 5 milliGRAM(s) Oral at bedtime  atorvastatin 40 milliGRAM(s) Oral at bedtime  donepezil 10 milliGRAM(s) Oral at bedtime  enoxaparin Injectable 40 milliGRAM(s) SubCutaneous every 24 hours  furosemide   Injectable 20 milliGRAM(s) IV Push daily  metoprolol tartrate 25 milliGRAM(s) Oral two times a day  polyethylene glycol 3350 17 Gram(s) Oral two times a day  senna 2 Tablet(s) Oral at bedtime  traZODone 150 milliGRAM(s) Oral at bedtime    MEDICATIONS  (PRN):      Allergies  No Known Allergies      Review of Systems:   Constitutional:  No Fever, No Chills  ENT/Mouth:  No Hearing Changes,  No Difficulty Swallowing  Eyes:  No Eye Pain, No Vision Changes  Cardiovascular:  No Chest Pain, No Palpitations  Respiratory:  No Cough, No Dyspnea  Gastrointestinal:  As described in HPI      Vital Signs Last 24 Hrs  T(C): 36.4 (10 Mar 2025 07:54), Max: 36.7 (10 Mar 2025 00:23)  T(F): 97.5 (10 Mar 2025 07:54), Max: 98 (10 Mar 2025 00:23)  HR: 62 (10 Mar 2025 07:54) (58 - 82)  BP: 120/51 (10 Mar 2025 07:54) (120/51 - 176/77)  BP(mean): 77 (09 Mar 2025 20:43) (77 - 77)  RR: 18 (10 Mar 2025 07:54) (18 - 18)  SpO2: 97% (10 Mar 2025 07:54) (93% - 97%)    Parameters below as of 10 Mar 2025 07:54  Patient On (Oxygen Delivery Method): nasal cannula  O2 Flow (L/min): 2      GENERAL: AAOx3, no acute distress.  HEAD:  Atraumatic, Normocephalic  EYES: conjunctiva and sclera clear  CHEST/LUNG: dec BS  HEART: Regular rate and rhythm; normal S1, S2.  ABDOMEN: Soft, nontender, nondistended; Bowel sounds present  SKIN: Intact, no jaundice      Labs:                        11.0   6.53  )-----------( 169      ( 10 Mar 2025 11:18 )             33.8     03-10    141  |  101  |  20  ----------------------------<  170[H]  3.6   |  28  |  0.8    Ca    8.1[L]      10 Mar 2025 11:18  Mg     1.7     03-10    TPro  5.8[L]  /  Alb  3.8  /  TBili  0.6  /  DBili  x   /  AST  9   /  ALT  9   /  AlkPhos  72  03-10    Radiology:  CT Abdomen and Pelvis w/ IV Cont:   ACC: [id number] EXAM:  CT ABDOMEN AND PELVIS IC   ORDERED BY: NURIS NICHOLAS     PROCEDURE DATE:  03/08/2025          INTERPRETATION:  CLINICAL STATEMENT: Metastatic workup. Thoracic   lymphadenopathy    TECHNIQUE: Contiguous axial CT images were obtained from the lower chest   to the pubic symphysis following administration of 100cc Omnipaque 350   intravenous contrast.  Oral contrast was not administered.  Reformatted   images in the coronal and sagittal planes were acquired.    COMPARISON: CT chest 3/7/2025, CT abdomen and pelvis 4/7/2024    FINDINGS:    Thorax better evaluated on 3/7/2025 CTA. Unchanged bilateral pleural   effusions.    HEPATOBILIARY: Postcholecystectomy. No biliary dilation. Liver   unremarkable.    SPLEEN: Unremarkable.    PANCREAS: Unremarkable.    ADRENAL GLANDS: Unchanged bilateral adrenal nodules, measuring up to 1.8   cm on right an 3.1 x 1.1 cm on left.    KIDNEYS: No hydronephrosis. Symmetric bilateral renal enhancement.    ABDOMINOPELVIC NODES: Unremarkable.    PELVIC ORGANS: Obscured by metallic streak artifact. Post hysterectomy.    PERITONEUM/MESENTERY/BOWEL: Colonic diverticulosis. No bowel obstruction.   No ascites..    BONES/SOFT TISSUES: Left hip total arthroplasty. Degenerative change of   spine    VASCULAR: Vascular calcifications.    OTHER: Unremarkable.      IMPRESSION:  Since 4/7/2024:    1. No definite sites of neoplastic disease within abdomen and pelvis.  2. Unchanged bilateral adrenal nodules, measuring up to 1.8 cm on right   an 3.1 x 1.1 cm on left.    
SUBJECTIVE/OVERNIGHT EVENTS  Today is hospital day 3d. This morning patient was seen and examined at bedside, resting comfortably in bed. No acute or major events overnight.      MEDICATIONS  STANDING MEDICATIONS  albuterol/ipratropium for Nebulization 3 milliLiter(s) Nebulizer every 6 hours  amLODIPine   Tablet 5 milliGRAM(s) Oral at bedtime  atorvastatin 40 milliGRAM(s) Oral at bedtime  chlorhexidine 2% Cloths 1 Application(s) Topical daily  donepezil 10 milliGRAM(s) Oral at bedtime  enoxaparin Injectable 40 milliGRAM(s) SubCutaneous every 24 hours  furosemide   Injectable 20 milliGRAM(s) IV Push daily  magnesium sulfate  IVPB 2 Gram(s) IV Intermittent Once  metoprolol tartrate 25 milliGRAM(s) Oral two times a day  polyethylene glycol 3350 17 Gram(s) Oral two times a day  senna 2 Tablet(s) Oral at bedtime  traZODone 150 milliGRAM(s) Oral at bedtime    PRN MEDICATIONS    VITALS  T(F): 97.5 (03-10-25 @ 07:54), Max: 98 (03-10-25 @ 00:23)  HR: 62 (03-10-25 @ 07:54) (58 - 82)  BP: 120/51 (03-10-25 @ 07:54) (120/51 - 176/77)  RR: 18 (03-10-25 @ 13:33) (18 - 18)  SpO2: 94% (03-10-25 @ 13:33) (93% - 97%)    PHYSICAL EXAM  General: NAD, non-toxic appearing  HEENT: EOMi, no scleral icterus  CV: RRR, normal s1 and s2  Lungs: normal respiratory effort, CTAB, on 2L NC   Abd: Soft, nontender, nondistended  Ext: no edema, warm, well perfused  Psych: A+Ox3, appropriate affect  Neuro: grossly non-focal, moving all extremities spontaneously  Skin: no rashes or lesions     LABS             11.0   6.53  )-----------( 169      ( 03-10-25 @ 11:18 )             33.8     141  |  101  |  20  -------------------------<  170   03-10-25 @ 11:18  3.6  |  28  |  0.8    Ca      8.1     03-10-25 @ 11:18  Mg     1.7     03-10-25 @ 11:18    TPro  5.8  /  Alb  3.8  /  TBili  0.6  /  DBili  x   /  AST  9   /  ALT  9   /  AlkPhos  72  /  GGT  x     03-10-25 @ 11:18      Troponin T, High Sensitivity Result: 18 ng/L (03-08-25 @ 07:03)  Troponin T, High Sensitivity Result: 14 ng/L (03-07-25 @ 19:31)  Troponin T, High Sensitivity Result: 13 ng/L (03-07-25 @ 18:19)    Urinalysis Basic - ( 10 Mar 2025 11:18 )    Color: x / Appearance: x / SG: x / pH: x  Gluc: 170 mg/dL / Ketone: x  / Bili: x / Urobili: x   Blood: x / Protein: x / Nitrite: x   Leuk Esterase: x / RBC: x / WBC x   Sq Epi: x / Non Sq Epi: x / Bacteria: x          IMAGING  
SUBJECTIVE/OVERNIGHT EVENTS  Today is hospital day 4d. This morning patient was seen and examined at bedside, resting comfortably in bed. No acute or major events overnight.    MEDICATIONS  STANDING MEDICATIONS  albuterol/ipratropium for Nebulization 3 milliLiter(s) Nebulizer every 6 hours  amLODIPine   Tablet 5 milliGRAM(s) Oral at bedtime  atorvastatin 40 milliGRAM(s) Oral at bedtime  chlorhexidine 2% Cloths 1 Application(s) Topical daily  donepezil 10 milliGRAM(s) Oral at bedtime  enoxaparin Injectable 40 milliGRAM(s) SubCutaneous every 24 hours  metoprolol tartrate 25 milliGRAM(s) Oral two times a day  polyethylene glycol 3350 17 Gram(s) Oral two times a day  senna 2 Tablet(s) Oral at bedtime  traZODone 150 milliGRAM(s) Oral at bedtime    PRN MEDICATIONS    VITALS  T(F): 97.8 (03-11-25 @ 07:49), Max: 98.6 (03-11-25 @ 05:27)  HR: 77 (03-11-25 @ 07:49) (66 - 85)  BP: 140/61 (03-11-25 @ 07:49) (134/56 - 152/68)  RR: 18 (03-11-25 @ 07:49) (18 - 19)  SpO2: 95% (03-11-25 @ 07:49) (83% - 96%)    PHYSICAL EXAM  General: NAD, non-toxic appearing  HEENT: EOMi, no scleral icterus  CV: RRR, normal s1 and s2  Lungs: rhonchi, on 4L NC   Abd: Soft, nontender, nondistended  Ext: no edema, warm, well perfused  Psych: A+Ox1, appropriate affect  Neuro: grossly non-focal, moving all extremities spontaneously  Skin: no rashes or lesions       LABS             11.7   19.41 )-----------( 190      ( 03-11-25 @ 08:31 )             36.5     138  |  98  |  17  -------------------------<  113   03-11-25 @ 08:31  3.6  |  28  |  0.8    Ca      8.5     03-11-25 @ 08:31  Mg     1.9     03-11-25 @ 08:31    TPro  6.2  /  Alb  4.0  /  TBili  1.3  /  DBili  x   /  AST  11  /  ALT  9   /  AlkPhos  68  /  GGT  x     03-11-25 @ 08:31        Urinalysis Basic - ( 11 Mar 2025 08:31 )    Color: x / Appearance: x / SG: x / pH: x  Gluc: 113 mg/dL / Ketone: x  / Bili: x / Urobili: x   Blood: x / Protein: x / Nitrite: x   Leuk Esterase: x / RBC: x / WBC x   Sq Epi: x / Non Sq Epi: x / Bacteria: x    
SUBJECTIVE:    Patient is a 89y old Female who presents with a chief complaint of     Today is hospital day 1d.     Overnight Events:     No acute overnight events. No active complaints    PAST MEDICAL & SURGICAL HISTORY  HTN (hypertension)    Hypercholesteremia    Heavy cigarette smoker    History of laparoscopic cholecystectomy    H/O: hysterectomy    History of surgery  LEFT EYE CATARACT EXTRACTION WITH LENS  IMPLANT          ALLERGIES:  No Known Allergies    MEDICATIONS:  STANDING MEDICATIONS  amLODIPine   Tablet 5 milliGRAM(s) Oral at bedtime  atorvastatin 40 milliGRAM(s) Oral at bedtime  donepezil 10 milliGRAM(s) Oral at bedtime  enoxaparin Injectable 40 milliGRAM(s) SubCutaneous every 24 hours  metoprolol tartrate 25 milliGRAM(s) Oral two times a day  polyethylene glycol 3350 17 Gram(s) Oral two times a day  senna 2 Tablet(s) Oral at bedtime  traZODone 150 milliGRAM(s) Oral at bedtime    PRN MEDICATIONS    VITALS:   ICU Vital Signs Last 24 Hrs  T(C): 36.7 (08 Mar 2025 07:45), Max: 36.7 (08 Mar 2025 05:10)  T(F): 98.1 (08 Mar 2025 07:45), Max: 98.1 (08 Mar 2025 05:10)  HR: 60 (08 Mar 2025 07:45) (56 - 72)  BP: 140/76 (08 Mar 2025 07:45) (140/76 - 186/63)  BP(mean): 96 (08 Mar 2025 07:45) (96 - 96)  RR: 18 (08 Mar 2025 07:45) (18 - 18)  SpO2: 94% (08 Mar 2025 07:45) (94% - 98%)    O2 Parameters below as of 08 Mar 2025 07:45  Patient On (Oxygen Delivery Method): room air      LABS:                        11.5   7.55  )-----------( 179      ( 08 Mar 2025 07:03 )             35.8     03-08    140  |  103  |  10  ----------------------------<  88  3.8   |  27  |  0.6[L]    Ca    8.3[L]      08 Mar 2025 07:03  Mg     2.0     03-08    TPro  6.3  /  Alb  4.2  /  TBili  0.6  /  DBili  x   /  AST  11  /  ALT  9   /  AlkPhos  71  03-07    PT/INR - ( 07 Mar 2025 18:19 )   PT: 12.00 sec;   INR: 1.02 ratio         PTT - ( 07 Mar 2025 18:19 )  PTT:36.4 sec  Urinalysis Basic - ( 08 Mar 2025 07:03 )    Color: x / Appearance: x / SG: x / pH: x  Gluc: 88 mg/dL / Ketone: x  / Bili: x / Urobili: x   Blood: x / Protein: x / Nitrite: x   Leuk Esterase: x / RBC: x / WBC x   Sq Epi: x / Non Sq Epi: x / Bacteria:         RADIOLOGY:      Lines:  Central line:              Date placed:             Indication:   Intravenous Access:   NG tube:   Good Catheter:       Diagnositic orders     Consult- Gastroenterology (03-08-25 @ 13:36) [Active]  CT Abdomen and Pelvis w/ IV Cont (03-08-25 @ 13:36) [Ordered.]  Prepare for Discharge (03-08-25 @ 07:21) [Active]  TTE Echo Complete w/o Contrast w/ Doppler (03-08-25 @ 07:20) [Performed]  Do Not Resuscitate (03-08-25 @ 01:30) [Active]  Consult- PT Evaluate and Treat (03-08-25 @ 00:45) [Active]  enoxaparin Injectable (03-08-25 @ 00:45) [Active]  metoprolol tartrate (03-08-25 @ 00:43) [Active]  senna (03-08-25 @ 00:43) [Active]  polyethylene glycol 3350 (03-08-25 @ 00:43) [Active]  atorvastatin (03-08-25 @ 00:43) [Active]  traZODone (03-08-25 @ 00:43) [Active]  donepezil (03-08-25 @ 00:43) [Active]  amLODIPine   Tablet (03-08-25 @ 00:43) [Active]  Quantiferon Plus TB (03-08-25 @ 00:04) [Specimen Received by Performing Department]  Carcinoembryonic Antigen (03-08-25 @ 00:04) [Specimen Received by Performing Department]  Neuron Specific Enolase (03-08-25 @ 00:04) [Specimen Received by Performing Department]  Consult- Hematology Oncology (03-08-25 @ 00:04) [Active]  Aspiration Precautions (03-08-25 @ 00:04) [Active]  Activity - Ambulate with Assistance (03-08-25 @ 00:04) [Active]  Diet, DASH/TLC (03-08-25 @ 00:04) [Active]  Admit to Inpatient Level of Care (03-07-25 @ 23:44) [Active]  Admit from ED (03-07-25 @ 22:09) [Active]  Admit to Inpatient Level of Care (03-07-25 @ 22:03) [Active]  Bed Request (03-07-25 @ 22:03) [Active]  Wet Read (03-07-25 @ 21:50) [Ordered.]  (ADM OVERRIDE) (03-07-25 @ 20:04) [Completed]  magnesium sulfate  IVPB (03-07-25 @ 19:30) [Completed]  Xray Shoulder 2 Views, Left (03-07-25 @ 17:35) [Performed]  Xray Humerus, Left (03-07-25 @ 17:35) [Performed]  IV Insert (03-07-25 @ 17:35) [Active]  Vital Signs (03-07-25 @ 17:35) [Active]  Pulse Oximetry (03-07-25 @ 17:35) [Active]  Cardiac Monitor Bedside (03-07-25 @ 17:35) [Active]  CT Angio Chest PE Protocol w/ IV Cont (03-07-25 @ 17:35) [Discontinued]  Xray Chest 1 View- PORTABLE-Urgent (03-07-25 @ 17:35) [Performed]  12 Lead ECG (03-07-25 @ 16:41) [Completed]    
SUBJECTIVE:    Patient is a 89y old Female who presents with a chief complaint of   Currently admitted to medicine with the primary diagnosis of Arm pain       Today is hospital day 3d.     PAST MEDICAL & SURGICAL HISTORY  HTN (hypertension)    Hypercholesteremia    Heavy cigarette smoker    History of laparoscopic cholecystectomy    H/O: hysterectomy    History of surgery  LEFT EYE CATARACT EXTRACTION WITH LENS  IMPLANT      ALLERGIES:  No Known Allergies    MEDICATIONS:  STANDING MEDICATIONS  albuterol/ipratropium for Nebulization 3 milliLiter(s) Nebulizer every 6 hours  amLODIPine   Tablet 5 milliGRAM(s) Oral at bedtime  atorvastatin 40 milliGRAM(s) Oral at bedtime  chlorhexidine 2% Cloths 1 Application(s) Topical daily  donepezil 10 milliGRAM(s) Oral at bedtime  enoxaparin Injectable 40 milliGRAM(s) SubCutaneous every 24 hours  magnesium sulfate  IVPB 2 Gram(s) IV Intermittent Once  metoprolol tartrate 25 milliGRAM(s) Oral two times a day  polyethylene glycol 3350 17 Gram(s) Oral two times a day  senna 2 Tablet(s) Oral at bedtime  traZODone 150 milliGRAM(s) Oral at bedtime    PRN MEDICATIONS    VITALS:   T(F): 97.5  HR: 62  BP: 120/51  RR: 19  SpO2: 92%    LABS:                        11.0   6.53  )-----------( 169      ( 10 Mar 2025 11:18 )             33.8     03-10    141  |  101  |  20  ----------------------------<  170[H]  3.6   |  28  |  0.8    Ca    8.1[L]      10 Mar 2025 11:18  Mg     1.7     03-10    TPro  5.8[L]  /  Alb  3.8  /  TBili  0.6  /  DBili  x   /  AST  9   /  ALT  9   /  AlkPhos  72  03-10      Urinalysis Basic - ( 10 Mar 2025 11:18 )    Color: x / Appearance: x / SG: x / pH: x  Gluc: 170 mg/dL / Ketone: x  / Bili: x / Urobili: x   Blood: x / Protein: x / Nitrite: x   Leuk Esterase: x / RBC: x / WBC x   Sq Epi: x / Non Sq Epi: x / Bacteria: x                RADIOLOGY:    PHYSICAL EXAM:  GEN: No acute distress  LUNGS: Clear to auscultation bilaterally   HEART: S1/S2 present. RRR.   ABD/ GI: Soft, non-tender, non-distended. Bowel sounds present  EXT: NC/NC/NE/2+PP/SCHULTZ  NEURO: AAOX2

## 2025-03-11 NOTE — PROGRESS NOTE ADULT - ATTENDING COMMENTS
Medicine Attending Addendum  Patient was seen and examined with medicine team.  Nursing records reviewed. I agree with the resident/PA/NP's note including past medical history, home medications, social history, allergies, surgical history, family history, and review of system. I have reviewed relevant vitals, laboratory values, imaging studies, and microbiology.   - Above resident's note was edited by me  - No acute complaints  - As family/daughter does not wish further work up for the mediastinal mass, hospice referral made. Palliative on the case  - rest of A/P as per detailed housestaff note above except above modifications    Total time spent to complete patient's bedside assessment, reviewed medical chart, discussed medical plan of care with team was 45 minutes with >50% of time spent face to face with patient, discussion with patient/family and/or coordination of care.

## 2025-03-12 DIAGNOSIS — J96.01 ACUTE RESPIRATORY FAILURE WITH HYPOXIA: ICD-10-CM

## 2025-03-12 DIAGNOSIS — J98.59 OTHER DISEASES OF MEDIASTINUM, NOT ELSEWHERE CLASSIFIED: ICD-10-CM

## 2025-03-12 DIAGNOSIS — Z51.5 ENCOUNTER FOR PALLIATIVE CARE: ICD-10-CM

## 2025-03-12 LAB
ALBUMIN SERPL ELPH-MCNC: 3.5 G/DL — SIGNIFICANT CHANGE UP (ref 3.5–5.2)
ALP SERPL-CCNC: 66 U/L — SIGNIFICANT CHANGE UP (ref 30–115)
ALT FLD-CCNC: 7 U/L — SIGNIFICANT CHANGE UP (ref 0–41)
ANION GAP SERPL CALC-SCNC: 9 MMOL/L — SIGNIFICANT CHANGE UP (ref 7–14)
AST SERPL-CCNC: 10 U/L — SIGNIFICANT CHANGE UP (ref 0–41)
BASOPHILS # BLD AUTO: 0.04 K/UL — SIGNIFICANT CHANGE UP (ref 0–0.2)
BASOPHILS NFR BLD AUTO: 0.2 % — SIGNIFICANT CHANGE UP (ref 0–1)
BILIRUB SERPL-MCNC: 0.8 MG/DL — SIGNIFICANT CHANGE UP (ref 0.2–1.2)
BUN SERPL-MCNC: 26 MG/DL — HIGH (ref 10–20)
CALCIUM SERPL-MCNC: 8.7 MG/DL — SIGNIFICANT CHANGE UP (ref 8.4–10.5)
CHLORIDE SERPL-SCNC: 99 MMOL/L — SIGNIFICANT CHANGE UP (ref 98–110)
CO2 SERPL-SCNC: 28 MMOL/L — SIGNIFICANT CHANGE UP (ref 17–32)
EGFR: 71 ML/MIN/1.73M2 — SIGNIFICANT CHANGE UP
EGFR: 71 ML/MIN/1.73M2 — SIGNIFICANT CHANGE UP
EOSINOPHIL # BLD AUTO: 0.03 K/UL — SIGNIFICANT CHANGE UP (ref 0–0.7)
EOSINOPHIL NFR BLD AUTO: 0.2 % — SIGNIFICANT CHANGE UP (ref 0–8)
GLUCOSE SERPL-MCNC: 157 MG/DL — HIGH (ref 70–99)
HCT VFR BLD CALC: 33.1 % — LOW (ref 37–47)
HGB BLD-MCNC: 11.1 G/DL — LOW (ref 12–16)
IMM GRANULOCYTES NFR BLD AUTO: 1.3 % — HIGH (ref 0.1–0.3)
INR BLD: 1.22 RATIO — SIGNIFICANT CHANGE UP (ref 0.65–1.3)
LYMPHOCYTES # BLD AUTO: 0.55 K/UL — LOW (ref 1.2–3.4)
LYMPHOCYTES # BLD AUTO: 3.2 % — LOW (ref 20.5–51.1)
MAGNESIUM SERPL-MCNC: 2.1 MG/DL — SIGNIFICANT CHANGE UP (ref 1.8–2.4)
MCHC RBC-ENTMCNC: 29.4 PG — SIGNIFICANT CHANGE UP (ref 27–31)
MCHC RBC-ENTMCNC: 33.5 G/DL — SIGNIFICANT CHANGE UP (ref 32–37)
MCV RBC AUTO: 87.6 FL — SIGNIFICANT CHANGE UP (ref 81–99)
MONOCYTES # BLD AUTO: 0.24 K/UL — SIGNIFICANT CHANGE UP (ref 0.1–0.6)
MONOCYTES NFR BLD AUTO: 1.4 % — LOW (ref 1.7–9.3)
NEUTROPHILS # BLD AUTO: 16.27 K/UL — HIGH (ref 1.4–6.5)
NEUTROPHILS NFR BLD AUTO: 93.7 % — HIGH (ref 42.2–75.2)
NRBC BLD AUTO-RTO: 0 /100 WBCS — SIGNIFICANT CHANGE UP (ref 0–0)
NSE FLD-MCNC: 11.5 NG/ML — SIGNIFICANT CHANGE UP (ref 0–17.6)
PHOSPHATE SERPL-MCNC: 3.1 MG/DL — SIGNIFICANT CHANGE UP (ref 2.1–4.9)
PLATELET # BLD AUTO: 179 K/UL — SIGNIFICANT CHANGE UP (ref 130–400)
PMV BLD: 10.2 FL — SIGNIFICANT CHANGE UP (ref 7.4–10.4)
POTASSIUM SERPL-MCNC: 3.8 MMOL/L — SIGNIFICANT CHANGE UP (ref 3.5–5)
POTASSIUM SERPL-SCNC: 3.8 MMOL/L — SIGNIFICANT CHANGE UP (ref 3.5–5)
PROCALCITONIN SERPL-MCNC: 0.25 NG/ML — HIGH (ref 0.02–0.1)
PROT SERPL-MCNC: 5.9 G/DL — LOW (ref 6–8)
PROTHROM AB SERPL-ACNC: 14.5 SEC — HIGH (ref 9.95–12.87)
RBC # FLD: 16 % — HIGH (ref 11.5–14.5)
SODIUM SERPL-SCNC: 136 MMOL/L — SIGNIFICANT CHANGE UP (ref 135–146)
WBC # BLD: 17.35 K/UL — HIGH (ref 4.8–10.8)
WBC # FLD AUTO: 17.35 K/UL — HIGH (ref 4.8–10.8)

## 2025-03-12 PROCEDURE — 99223 1ST HOSP IP/OBS HIGH 75: CPT

## 2025-03-12 PROCEDURE — 76700 US EXAM ABDOM COMPLETE: CPT | Mod: 26

## 2025-03-12 PROCEDURE — 99233 SBSQ HOSP IP/OBS HIGH 50: CPT

## 2025-03-12 PROCEDURE — 99497 ADVNCD CARE PLAN 30 MIN: CPT | Mod: 25

## 2025-03-12 PROCEDURE — 71045 X-RAY EXAM CHEST 1 VIEW: CPT | Mod: 26

## 2025-03-12 PROCEDURE — 74018 RADEX ABDOMEN 1 VIEW: CPT | Mod: 26

## 2025-03-12 RX ORDER — HYPROMELLOSE 0.4 %
1 DROPS OPHTHALMIC (EYE) DAILY
Refills: 0 | Status: DISCONTINUED | OUTPATIENT
Start: 2025-03-12 | End: 2025-03-17

## 2025-03-12 RX ORDER — TRAZODONE HCL 100 MG
75 TABLET ORAL AT BEDTIME
Refills: 0 | Status: DISCONTINUED | OUTPATIENT
Start: 2025-03-12 | End: 2025-03-13

## 2025-03-12 RX ORDER — AZITHROMYCIN 250 MG
500 CAPSULE ORAL EVERY 24 HOURS
Refills: 0 | Status: DISCONTINUED | OUTPATIENT
Start: 2025-03-12 | End: 2025-03-14

## 2025-03-12 RX ORDER — FUROSEMIDE 10 MG/ML
40 INJECTION INTRAMUSCULAR; INTRAVENOUS DAILY
Refills: 0 | Status: DISCONTINUED | OUTPATIENT
Start: 2025-03-12 | End: 2025-03-15

## 2025-03-12 RX ORDER — ENOXAPARIN SODIUM 100 MG/ML
40 INJECTION SUBCUTANEOUS EVERY 24 HOURS
Refills: 0 | Status: DISCONTINUED | OUTPATIENT
Start: 2025-03-12 | End: 2025-03-13

## 2025-03-12 RX ORDER — SENNA 187 MG
2 TABLET ORAL AT BEDTIME
Refills: 0 | Status: DISCONTINUED | OUTPATIENT
Start: 2025-03-12 | End: 2025-03-12

## 2025-03-12 RX ORDER — AZITHROMYCIN 250 MG
500 CAPSULE ORAL EVERY 24 HOURS
Refills: 0 | Status: DISCONTINUED | OUTPATIENT
Start: 2025-03-12 | End: 2025-03-12

## 2025-03-12 RX ORDER — CEFTRIAXONE 500 MG/1
1000 INJECTION, POWDER, FOR SOLUTION INTRAMUSCULAR; INTRAVENOUS EVERY 24 HOURS
Refills: 0 | Status: DISCONTINUED | OUTPATIENT
Start: 2025-03-12 | End: 2025-03-12

## 2025-03-12 RX ORDER — POLYETHYLENE GLYCOL 3350 17 G/17G
17 POWDER, FOR SOLUTION ORAL
Refills: 0 | Status: DISCONTINUED | OUTPATIENT
Start: 2025-03-12 | End: 2025-03-12

## 2025-03-12 RX ORDER — ATORVASTATIN CALCIUM 80 MG/1
40 TABLET, FILM COATED ORAL AT BEDTIME
Refills: 0 | Status: DISCONTINUED | OUTPATIENT
Start: 2025-03-12 | End: 2025-03-14

## 2025-03-12 RX ORDER — HYPROMELLOSE 0.4 %
1 DROPS OPHTHALMIC (EYE)
Refills: 0 | DISCHARGE

## 2025-03-12 RX ORDER — AMLODIPINE BESYLATE 10 MG/1
5 TABLET ORAL AT BEDTIME
Refills: 0 | Status: DISCONTINUED | OUTPATIENT
Start: 2025-03-12 | End: 2025-03-14

## 2025-03-12 RX ORDER — CEFTRIAXONE 500 MG/1
1000 INJECTION, POWDER, FOR SOLUTION INTRAMUSCULAR; INTRAVENOUS ONCE
Refills: 0 | Status: COMPLETED | OUTPATIENT
Start: 2025-03-12 | End: 2025-03-12

## 2025-03-12 RX ORDER — METOPROLOL SUCCINATE 50 MG/1
25 TABLET, EXTENDED RELEASE ORAL
Refills: 0 | Status: DISCONTINUED | OUTPATIENT
Start: 2025-03-12 | End: 2025-03-14

## 2025-03-12 RX ORDER — METHYLPREDNISOLONE ACETATE 80 MG/ML
40 INJECTION, SUSPENSION INTRA-ARTICULAR; INTRALESIONAL; INTRAMUSCULAR; SOFT TISSUE DAILY
Refills: 0 | Status: DISCONTINUED | OUTPATIENT
Start: 2025-03-12 | End: 2025-03-12

## 2025-03-12 RX ORDER — CEFTRIAXONE 500 MG/1
INJECTION, POWDER, FOR SOLUTION INTRAMUSCULAR; INTRAVENOUS
Refills: 0 | Status: DISCONTINUED | OUTPATIENT
Start: 2025-03-12 | End: 2025-03-12

## 2025-03-12 RX ORDER — IPRATROPIUM BROMIDE AND ALBUTEROL SULFATE .5; 2.5 MG/3ML; MG/3ML
3 SOLUTION RESPIRATORY (INHALATION) EVERY 6 HOURS
Refills: 0 | Status: DISCONTINUED | OUTPATIENT
Start: 2025-03-12 | End: 2025-03-17

## 2025-03-12 RX ORDER — ACETAMINOPHEN 500 MG/5ML
650 LIQUID (ML) ORAL EVERY 6 HOURS
Refills: 0 | Status: DISCONTINUED | OUTPATIENT
Start: 2025-03-12 | End: 2025-03-17

## 2025-03-12 RX ORDER — METHYLPREDNISOLONE ACETATE 80 MG/ML
40 INJECTION, SUSPENSION INTRA-ARTICULAR; INTRALESIONAL; INTRAMUSCULAR; SOFT TISSUE
Refills: 0 | Status: DISCONTINUED | OUTPATIENT
Start: 2025-03-12 | End: 2025-03-13

## 2025-03-12 RX ORDER — TIOTROPIUM BROMIDE INHALATION SPRAY 3.12 UG/1
2 SPRAY, METERED RESPIRATORY (INHALATION) DAILY
Refills: 0 | Status: DISCONTINUED | OUTPATIENT
Start: 2025-03-12 | End: 2025-03-17

## 2025-03-12 RX ORDER — DONEPEZIL HYDROCHLORIDE 5 MG/1
10 TABLET ORAL AT BEDTIME
Refills: 0 | Status: DISCONTINUED | OUTPATIENT
Start: 2025-03-12 | End: 2025-03-16

## 2025-03-12 RX ADMIN — METOPROLOL SUCCINATE 25 MILLIGRAM(S): 50 TABLET, EXTENDED RELEASE ORAL at 05:22

## 2025-03-12 RX ADMIN — IPRATROPIUM BROMIDE AND ALBUTEROL SULFATE 3 MILLILITER(S): .5; 2.5 SOLUTION RESPIRATORY (INHALATION) at 21:21

## 2025-03-12 RX ADMIN — DONEPEZIL HYDROCHLORIDE 10 MILLIGRAM(S): 5 TABLET ORAL at 21:24

## 2025-03-12 RX ADMIN — FUROSEMIDE 40 MILLIGRAM(S): 10 INJECTION INTRAMUSCULAR; INTRAVENOUS at 14:16

## 2025-03-12 RX ADMIN — TIOTROPIUM BROMIDE INHALATION SPRAY 2 PUFF(S): 3.12 SPRAY, METERED RESPIRATORY (INHALATION) at 14:33

## 2025-03-12 RX ADMIN — ENOXAPARIN SODIUM 40 MILLIGRAM(S): 100 INJECTION SUBCUTANEOUS at 11:49

## 2025-03-12 RX ADMIN — METOPROLOL SUCCINATE 25 MILLIGRAM(S): 50 TABLET, EXTENDED RELEASE ORAL at 17:16

## 2025-03-12 RX ADMIN — CEFTRIAXONE 1000 MILLIGRAM(S): 500 INJECTION, POWDER, FOR SOLUTION INTRAMUSCULAR; INTRAVENOUS at 00:23

## 2025-03-12 RX ADMIN — Medication 650 MILLIGRAM(S): at 14:15

## 2025-03-12 RX ADMIN — Medication 75 MILLIGRAM(S): at 21:23

## 2025-03-12 RX ADMIN — IPRATROPIUM BROMIDE AND ALBUTEROL SULFATE 3 MILLILITER(S): .5; 2.5 SOLUTION RESPIRATORY (INHALATION) at 14:33

## 2025-03-12 RX ADMIN — Medication 1 DROP(S): at 14:16

## 2025-03-12 RX ADMIN — AMLODIPINE BESYLATE 5 MILLIGRAM(S): 10 TABLET ORAL at 21:23

## 2025-03-12 RX ADMIN — METHYLPREDNISOLONE ACETATE 40 MILLIGRAM(S): 80 INJECTION, SUSPENSION INTRA-ARTICULAR; INTRALESIONAL; INTRAMUSCULAR; SOFT TISSUE at 05:22

## 2025-03-12 RX ADMIN — ATORVASTATIN CALCIUM 40 MILLIGRAM(S): 80 TABLET, FILM COATED ORAL at 21:23

## 2025-03-12 RX ADMIN — METHYLPREDNISOLONE ACETATE 40 MILLIGRAM(S): 80 INJECTION, SUSPENSION INTRA-ARTICULAR; INTRALESIONAL; INTRAMUSCULAR; SOFT TISSUE at 17:16

## 2025-03-12 RX ADMIN — Medication 500 MILLIGRAM(S): at 11:48

## 2025-03-12 RX ADMIN — IPRATROPIUM BROMIDE AND ALBUTEROL SULFATE 3 MILLILITER(S): .5; 2.5 SOLUTION RESPIRATORY (INHALATION) at 14:32

## 2025-03-12 NOTE — SWALLOW BEDSIDE ASSESSMENT ADULT - SLP GENERAL OBSERVATIONS
pt received in bed awake on 3L NC +slight confusion noted needing constant redirection to proceed with dysphagia evaluation

## 2025-03-12 NOTE — SWALLOW BEDSIDE ASSESSMENT ADULT - ORAL PREPARATORY PHASE
Within functional limits 5-Fu Pregnancy And Lactation Text: This medication is Pregnancy Category X and contraindicated in pregnancy and in women who may become pregnant. It is unknown if this medication is excreted in breast milk.

## 2025-03-12 NOTE — CONSULT NOTE ADULT - SUBJECTIVE AND OBJECTIVE BOX
HPI:  89-year-old female with past medical history of hypertension, hyperlipidemia, CHF, pulmonary hypertension, aortic stenosis, possible COPD was discharged yesterday 3/10 on 2L home oxygen, history of DVT not on anticoagulation, newly diagnosed esophageal mass presented for evaluation shortness of breath, desatting to 80s on 4L NC. Denies fever, cough sore throat, chills, nausea, vomiting, shortness of breath, abdominal pain, urinary complaints.   Patient was found to have Mediastinal Mass, Subcarinal/Esophgeal Mass Suspicious for Neoplasia and severe pulmonary hypertension on CTA Chest on recent admission, daughter refused EGD/EUS and further work up, per daughter patient would not want to pursue chemo even if found to have cancer. Family planing to opt for hospice. Patient was discharged on 3/10/2025 with home oxygen. Patient is DNR/DNI.     Granddaughter at bedside states that she also noticed increased secretions and some gurgling sound.    VITAL SIGNS:  BP: 110/65  Heart Rate: 68 /min  Respiration Rate: 18 /min  SpO2 (%): 98 % on mask, nonrebreather  Oxygen Therapy Flow (L/min)	10 L/min    LABS:   WBC: 21.62  Hb: 11.7  T. bili: 1.3    RVP negative    VBG: pH: 7.38  pCO2: 50  pO2: 46  HCO3: 30      CXR: looks similar to previous, no new changes    s/p duonebs and solumedrol 80mg IVP in ED.  (11 Mar 2025 23:58)    Patient known to palliative care team from yesterday.   Discussed with patient's family.       ITEMS NOT CHECKED ARE NOT PRESENT    SOCIAL HISTORY:   Significant other/partner[ ]  Children[x ]  Holiness/Spirituality:  Substance hx:  [ ]   Tobacco hx:  [ ]   Alcohol hx: [ ]   Living Situation: [ ]Home  [ ]Long term care  [ ]Rehab [ ]Other  Home Services: [ ] HHA [ ] Visting RN [ ] Hospice  Occupation:  Home Opioid hx:  [ ] Y [ ] N [ ] I-Stop Reference No:     ADVANCE DIRECTIVES:    [ ] Full Code [x ] DNR  MOLST  [ ]  Living Will  [ ]   DECISION MAKER(s):  [ ] Health Care Proxy(s)  [ ] Surrogate(s)  [ ] Guardian           Name(s): Phone Number(s):    BASELINE (I)ADL(s) (prior to admission):  [place]: [ ]Total  [ ] Moderate [ ]Dependent  Palliative Performance Status Version 2:       50  %    [email or web address]    Allergies    No Known Allergies    Intolerances    MEDICATIONS  (STANDING):  albuterol/ipratropium for Nebulization 3 milliLiter(s) Nebulizer every 6 hours  amLODIPine   Tablet 5 milliGRAM(s) Oral at bedtime  artificial  tears Solution 1 Drop(s) Both EYES daily  atorvastatin 40 milliGRAM(s) Oral at bedtime  azithromycin   Tablet 500 milliGRAM(s) Oral every 24 hours  chlorhexidine 2% Cloths 1 Application(s) Topical <User Schedule>  donepezil 10 milliGRAM(s) Oral at bedtime  enoxaparin Injectable 40 milliGRAM(s) SubCutaneous every 24 hours  fluticasone propionate/ salmeterol 250-50 MICROgram(s) Diskus 1 Dose(s) Inhalation two times a day  furosemide   Injectable 40 milliGRAM(s) IV Push daily  methylPREDNISolone sodium succinate Injectable 40 milliGRAM(s) IV Push two times a day  metoprolol tartrate 25 milliGRAM(s) Oral two times a day  pantoprazole    Tablet 40 milliGRAM(s) Oral before breakfast  tiotropium 2.5 MICROgram(s) Inhaler 2 Puff(s) Inhalation daily  traZODone 75 milliGRAM(s) Oral at bedtime    MEDICATIONS  (PRN):  acetaminophen     Tablet .. 650 milliGRAM(s) Oral every 6 hours PRN Mild Pain (1 - 3)      PRESENT SYMPTOMS: [ ]Unable to obtain due to poor mentation   Source if other than patient:  [ ]Family   [ ]Team     Pain: [ ]yes [x ]no  QOL impact -   Location -                    Aggravating factors -  Alleviating factors -   Quality -  Radiation -  Timing -   Severity (0-10 scale):  Minimal acceptable level (0-10 scale):     CPOT:    https://www.[facility].org/getattachment/ptk75a19-5r1u-0o1g-8q4r-5336m1986h2c/Critical-Care-Pain-Observation-Tool-(CPOT)    PAIN AD Score:   http://geriatrictoolkit.[place].[email or web address]/cog/painad.pdf     Dyspnea:                           [x ]None[ ]Mild [ ]Moderate [ ]Severe     Respiratory Distress Observation Scale (RDOS):   A score of 0 to 2 signifies little or no respiratory distress, 3 signifies mild distress, scores 4 to 6 indicate moderate distress, and scores greater than 7 signify severe distress  [email or web address]    Anxiety:                             [ ]None[ ]Mild [ ]Moderate [ ]Severe   Fatigue:                             [ ]None[ ]Mild [ ]Moderate [ ]Severe   Nausea:                             [ ]None[ ]Mild [ ]Moderate [ ]Severe   Loss of appetite:              [ ]None[ ]Mild [ ]Moderate [ ]Severe   Constipation:                    [ ]None[ ]Mild [ ]Moderate [ ]Severe    Other Symptoms:  [x ]All other review of systems negative     PHYSICAL EXAM:    GENERAL:  NAD   PULMONARY:  Non labored breathing  NEUROLOGIC: Lying in bed  BEHAVIORAL/PSYCH:  Calm    LABS: I have reviewed daily labs                          11.1   17.35 )-----------( 179      ( 12 Mar 2025 07:37 )             33.1       03-12    136  |  99  |  26[H]  ----------------------------<  157[H]  3.8   |  28  |  0.8    Ca    8.7      12 Mar 2025 07:37  Phos  3.1     03-12  Mg     2.1     03-12    TPro  5.9[L]  /  Alb  3.5  /  TBili  0.8  /  DBili  x   /  AST  10  /  ALT  7   /  AlkPhos  66  03-12          RADIOLOGY & ADDITIONAL STUDIES: I have reviewed new imaging    PROTEIN CALORIE MALNUTRITION PRESENT: [ ]mild [ ]moderate [ ]severe [ ]underweight [ ]morbid obesity  https://www.andeal.org/vault/2440/web/files/ONC/Table_Clinical%20Characteristics%20to%20Document%20Malnutrition-White%20JV%20et%20al%202012.pdf    Height (cm): 170.2 (03-12-25 @ 00:40), 172.7 (03-07-25 @ 16:31), 167.6 (04-07-24 @ 13:05)  Weight (kg): 64.4 (03-12-25 @ 00:40), 64.4 (03-07-25 @ 16:31), 65.771 (04-07-24 @ 19:17)  BMI (kg/m2): 22.2 (03-12-25 @ 00:40), 21.6 (03-07-25 @ 16:31), 23.4 (04-07-24 @ 19:17)    [ ]PPSV2 < or = to 30% [ ]significant weight loss  [ ]poor nutritional intake  [ ]anasarca      [ ]Artificial Nutrition      Palliative Care Spiritual/Emotional Screening Tool Question  Severity (0-4):                    OR                    [ x] Unable to determine. Will assess at later time if appropriate.  Score of 2 or greater indicates recommendation of Chaplaincy and/or SW referral  Chaplaincy Referral: [ ] Yes [ ] Refused [ ] Following     Caregiver [place]:  [ ] Yes [ ] No    OR    [x ] Unable to determine. Will assess at later time if appropriate.  Social Work Referral [ ]  Patient and Family Centered Care Referral [ ]    Anticipatory Grief Present: [ ] Yes [ ] No    OR     [ x] Unable to determine. Will assess at later time if appropriate.  Social Work Referral [ ]  Patient and Family Centered Care Referral [ ]    REFERRALS:   [ ]Chaplaincy  [ ]Hospice  [ ]Child Life  [ ]Social Work  [ ]Case management [ ]Holistic Therapy     Palliative care education provided to patient and/or family

## 2025-03-12 NOTE — SWALLOW BEDSIDE ASSESSMENT ADULT - SLP PERTINENT HISTORY OF CURRENT PROBLEM
89-year-old female with past medical history of hypertension, hyperlipidemia, CHF, pulmonary hypertension, aortic stenosis, possible COPD was discharged yesterday 3/10 on 2L home oxygen, history of DVT not on anticoagulation, newly diagnosed esophageal mass presented for evaluation shortness of breath, desatting to 80s on 4L NC. Denies fever, cough sore throat, chills, nausea, vomiting, shortness of breath, abdominal pain, urinary complaints.

## 2025-03-12 NOTE — HOSPICE CARE NOTE - CONVESATION DETAILS
Patient was referred to hospice again. Spoke with patient's daughter Matilda Aquino. As per Matilda patient was discharged yesterday and had difficulty breathing and family called 911. Reviewed hospice philosophy and services. Matilda said she will need to discuss hospice with her children before making a decision and get back to us. Hospice to remain available.

## 2025-03-12 NOTE — CONSULT NOTE ADULT - ASSESSMENT
89-year-old female with past medical history of hypertension, hyperlipidemia, CHF, pulmonary hypertension, aortic stenosis, possible COPD was discharged yesterday 3/10 on 2L home oxygen, history of DVT not on anticoagulation, newly diagnosed esophageal mass presented for evaluation shortness of breath, desatting to 80s on 4L NC. Patient seen by palliative care team yesterday prior to discharge, and they request re-evaluation.     Discussed with patient's daughter (Matilda) and patient's grandchildren. They confirm that patient would not want further workup and treatment of her mass. They have been in communication with the hospice team already. Matilda has reservations that they would not be able to call 911, should something happen at home. She acknowledges that she would have no reason to call 911 if patient is comfortable, and understands that hospice would help with patient comfort. Ultimately, she states that patient herself would want to be treated for comfort. We additionally discussed CMO including stopping labs, vitals, and O2. They consider these issues further. I iterated to the family that if their priority is for patient to return home, then we can keep NC in place while patient remains admitted.     Plan:  - patient presently denies pain/SOB  - continue PRN tylenol  - can start Roxanol 5mg q4h PRN for pain/dyspnea if needed    - if starting Roxanol, recommend PRN senna/miralax  - anticipate that family will decide to take patient home with hospice care  - DNR/DNI    Palliative care will continue to follow.   Please call [phone] with questions or concerns 24/7.   _____________  He Shmuel Abarca MD  Palliative Medicine  [facility]   of Geriatric and Palliative Medicine  [phone]

## 2025-03-12 NOTE — SWALLOW BEDSIDE ASSESSMENT ADULT - ADDITIONAL RECOMMENDATIONS
Pt discharged for previous admission on 3/10 with plans for hospice. If family GOC remain the same, recommend regular solids/thin liquids.

## 2025-03-12 NOTE — CONSULT NOTE ADULT - CONVERSATION DETAILS
Discussed with patient's daughter (Matilda) and patient's grandchildren. They confirm that patient would not want further workup and treatment of her mass. They have been in communication with the hospice team already. Matilda has reservations that they would not be able to call 911, should something happen at home. She acknowledges that she would have no reason to call 911 if patient is comfortable, and understands that hospice would help with patient comfort. Ultimately, she states that patient herself would want to be treated for comfort. We additionally discussed CMO including stopping labs, vitals, and O2. They consider these issues further. I iterated to the family that if their priority is for patient to return home, then we can keep NC in place while patient remains admitted.

## 2025-03-12 NOTE — SWALLOW BEDSIDE ASSESSMENT ADULT - COMMENTS
Pt previously admitted, d/c on 3/10 with plans for hospice. Family GOC -> Plan for hospice and do not want to proceed with biopsy of mass.

## 2025-03-12 NOTE — SWALLOW BEDSIDE ASSESSMENT ADULT - SWALLOW EVAL: PATIENT/FAMILY GOALS STATEMENT
Pt discharged for previous admission on 3/10 with plans for hospice and would not like to pursue biopsy of mediastinal mass at this time.

## 2025-03-12 NOTE — SWALLOW BEDSIDE ASSESSMENT ADULT - SWALLOW EVAL: DIAGNOSIS
+min overt s/s for thin liquids w/ cough post-oral intake x1 out of all trials given +baseline rhonchus breathing noted +toleration of puree and regular solids w/o overt s/s of penetration/aspiration. +min overt s/s for thin liquids w/ cough post-oral intake x1 out of all trials given +baseline rhonchus breathing noted + toleration observed without overt symptoms of penetration/aspiration for solids

## 2025-03-13 VITALS
SYSTOLIC BLOOD PRESSURE: 113 MMHG | DIASTOLIC BLOOD PRESSURE: 70 MMHG | OXYGEN SATURATION: 98 % | HEART RATE: 98 BPM | TEMPERATURE: 98 F | RESPIRATION RATE: 18 BRPM

## 2025-03-13 DIAGNOSIS — Z51.5 ENCOUNTER FOR PALLIATIVE CARE: ICD-10-CM

## 2025-03-13 LAB
BASOPHILS # BLD AUTO: 0.04 K/UL — SIGNIFICANT CHANGE UP (ref 0–0.2)
BASOPHILS NFR BLD AUTO: 0.2 % — SIGNIFICANT CHANGE UP (ref 0–1)
EOSINOPHIL # BLD AUTO: 0.01 K/UL — SIGNIFICANT CHANGE UP (ref 0–0.7)
EOSINOPHIL NFR BLD AUTO: 0 % — SIGNIFICANT CHANGE UP (ref 0–8)
GAMMA INTERFERON BACKGROUND BLD IA-ACNC: 0.04 IU/ML — SIGNIFICANT CHANGE UP
HGB BLD-MCNC: 12.2 G/DL — SIGNIFICANT CHANGE UP (ref 12–16)
IMM GRANULOCYTES NFR BLD AUTO: 1.4 % — HIGH (ref 0.1–0.3)
LYMPHOCYTES # BLD AUTO: 0.73 K/UL — LOW (ref 1.2–3.4)
LYMPHOCYTES # BLD AUTO: 3.6 % — LOW (ref 20.5–51.1)
M TB IFN-G BLD-IMP: POSITIVE
M TB IFN-G CD4+ BCKGRND COR BLD-ACNC: 0.6 IU/ML — SIGNIFICANT CHANGE UP
M TB IFN-G CD4+CD8+ BCKGRND COR BLD-ACNC: 0.58 IU/ML — SIGNIFICANT CHANGE UP
MCHC RBC-ENTMCNC: 28.8 PG — SIGNIFICANT CHANGE UP (ref 27–31)
MCHC RBC-ENTMCNC: 33.2 G/DL — SIGNIFICANT CHANGE UP (ref 32–37)
MCV RBC AUTO: 86.8 FL — SIGNIFICANT CHANGE UP (ref 81–99)
MONOCYTES # BLD AUTO: 0.66 K/UL — HIGH (ref 0.1–0.6)
MONOCYTES NFR BLD AUTO: 3.2 % — SIGNIFICANT CHANGE UP (ref 1.7–9.3)
NEUTROPHILS # BLD AUTO: 18.72 K/UL — HIGH (ref 1.4–6.5)
NEUTROPHILS NFR BLD AUTO: 91.6 % — HIGH (ref 42.2–75.2)
NRBC BLD AUTO-RTO: 0 /100 WBCS — SIGNIFICANT CHANGE UP (ref 0–0)
PLATELET # BLD AUTO: 215 K/UL — SIGNIFICANT CHANGE UP (ref 130–400)
PMV BLD: 10.2 FL — SIGNIFICANT CHANGE UP (ref 7.4–10.4)
QUANT TB PLUS MITOGEN MINUS NIL: 4.27 IU/ML — SIGNIFICANT CHANGE UP
RBC # BLD: 4.24 M/UL — SIGNIFICANT CHANGE UP (ref 4.2–5.4)
RBC # FLD: 15.9 % — HIGH (ref 11.5–14.5)
WBC # BLD: 20.44 K/UL — HIGH (ref 4.8–10.8)
WBC # FLD AUTO: 20.44 K/UL — HIGH (ref 4.8–10.8)

## 2025-03-13 PROCEDURE — 99233 SBSQ HOSP IP/OBS HIGH 50: CPT | Mod: 25

## 2025-03-13 PROCEDURE — 99233 SBSQ HOSP IP/OBS HIGH 50: CPT

## 2025-03-13 PROCEDURE — 71045 X-RAY EXAM CHEST 1 VIEW: CPT | Mod: 26

## 2025-03-13 PROCEDURE — 99497 ADVNCD CARE PLAN 30 MIN: CPT

## 2025-03-13 RX ORDER — LORAZEPAM 4 MG/ML
0.5 VIAL (ML) INJECTION EVERY 4 HOURS
Refills: 0 | Status: DISCONTINUED | OUTPATIENT
Start: 2025-03-13 | End: 2025-03-14

## 2025-03-13 RX ORDER — HALOPERIDOL 10 MG/1
0.5 TABLET ORAL EVERY 6 HOURS
Refills: 0 | Status: DISCONTINUED | OUTPATIENT
Start: 2025-03-13 | End: 2025-03-13

## 2025-03-13 RX ORDER — TRAZODONE HCL 100 MG
150 TABLET ORAL AT BEDTIME
Refills: 0 | Status: DISCONTINUED | OUTPATIENT
Start: 2025-03-13 | End: 2025-03-17

## 2025-03-13 RX ORDER — OLANZAPINE 10 MG/1
2.5 TABLET ORAL ONCE
Refills: 0 | Status: COMPLETED | OUTPATIENT
Start: 2025-03-13 | End: 2025-03-13

## 2025-03-13 RX ORDER — HALOPERIDOL 10 MG/1
0.5 TABLET ORAL EVERY 6 HOURS
Refills: 0 | Status: DISCONTINUED | OUTPATIENT
Start: 2025-03-13 | End: 2025-03-14

## 2025-03-13 RX ORDER — LORAZEPAM 4 MG/ML
0.5 VIAL (ML) INJECTION EVERY 4 HOURS
Refills: 0 | Status: DISCONTINUED | OUTPATIENT
Start: 2025-03-13 | End: 2025-03-13

## 2025-03-13 RX ORDER — PREDNISONE 20 MG/1
40 TABLET ORAL DAILY
Refills: 0 | Status: DISCONTINUED | OUTPATIENT
Start: 2025-03-14 | End: 2025-03-14

## 2025-03-13 RX ADMIN — Medication 40 MILLIGRAM(S): at 05:45

## 2025-03-13 RX ADMIN — DONEPEZIL HYDROCHLORIDE 10 MILLIGRAM(S): 5 TABLET ORAL at 21:31

## 2025-03-13 RX ADMIN — ENOXAPARIN SODIUM 40 MILLIGRAM(S): 100 INJECTION SUBCUTANEOUS at 11:11

## 2025-03-13 RX ADMIN — AMLODIPINE BESYLATE 5 MILLIGRAM(S): 10 TABLET ORAL at 21:31

## 2025-03-13 RX ADMIN — Medication 1 APPLICATION(S): at 05:44

## 2025-03-13 RX ADMIN — IPRATROPIUM BROMIDE AND ALBUTEROL SULFATE 3 MILLILITER(S): .5; 2.5 SOLUTION RESPIRATORY (INHALATION) at 08:20

## 2025-03-13 RX ADMIN — METOPROLOL SUCCINATE 25 MILLIGRAM(S): 50 TABLET, EXTENDED RELEASE ORAL at 18:31

## 2025-03-13 RX ADMIN — Medication 1 DROP(S): at 11:11

## 2025-03-13 RX ADMIN — TIOTROPIUM BROMIDE INHALATION SPRAY 2 PUFF(S): 3.12 SPRAY, METERED RESPIRATORY (INHALATION) at 08:16

## 2025-03-13 RX ADMIN — OLANZAPINE 2.5 MILLIGRAM(S): 10 TABLET ORAL at 05:45

## 2025-03-13 RX ADMIN — Medication 500 MILLIGRAM(S): at 11:08

## 2025-03-13 RX ADMIN — IPRATROPIUM BROMIDE AND ALBUTEROL SULFATE 3 MILLILITER(S): .5; 2.5 SOLUTION RESPIRATORY (INHALATION) at 14:25

## 2025-03-13 RX ADMIN — Medication 150 MILLIGRAM(S): at 21:31

## 2025-03-13 RX ADMIN — METOPROLOL SUCCINATE 25 MILLIGRAM(S): 50 TABLET, EXTENDED RELEASE ORAL at 05:45

## 2025-03-13 RX ADMIN — IPRATROPIUM BROMIDE AND ALBUTEROL SULFATE 3 MILLILITER(S): .5; 2.5 SOLUTION RESPIRATORY (INHALATION) at 02:24

## 2025-03-13 RX ADMIN — Medication 0.5 MILLIGRAM(S): at 22:54

## 2025-03-13 RX ADMIN — METHYLPREDNISOLONE ACETATE 40 MILLIGRAM(S): 80 INJECTION, SUSPENSION INTRA-ARTICULAR; INTRALESIONAL; INTRAMUSCULAR; SOFT TISSUE at 05:44

## 2025-03-13 RX ADMIN — FUROSEMIDE 40 MILLIGRAM(S): 10 INJECTION INTRAMUSCULAR; INTRAVENOUS at 05:45

## 2025-03-13 RX ADMIN — ATORVASTATIN CALCIUM 40 MILLIGRAM(S): 80 TABLET, FILM COATED ORAL at 21:31

## 2025-03-13 RX ADMIN — IPRATROPIUM BROMIDE AND ALBUTEROL SULFATE 3 MILLILITER(S): .5; 2.5 SOLUTION RESPIRATORY (INHALATION) at 20:03

## 2025-03-13 NOTE — PROGRESS NOTE ADULT - CONVERSATION DETAILS
89 yo female with recent diagnosis of COPD and mediastinal mass. Patient's family confirmed DNR/DNI and do not want further work up of the mass. MOLST completed by me
Spoke with family at bedside. They report that they have decided to take the patient home with hospice care. For the time being, they would like to de-escalate care and agree to CMO- no further labs, testing, escalation of care. They would like to continue PO meds, IV lasix, add PRNs for agitation and pain, and continue vitals but only once daily. They also would like to continue nasal cannula for now.

## 2025-03-13 NOTE — PROGRESS NOTE ADULT - ATTENDING COMMENTS
My note supersedes the resident's note in the event of a discrepancy -    Patient seen and examined this morning  lying in bed  in nad  awake and alert but confused     Vital Signs Last 24 Hrs  T(C): 36.3 (13 Mar 2025 07:20), Max: 36.8 (13 Mar 2025 01:01)  T(F): 97.3 (13 Mar 2025 07:20), Max: 98.3 (13 Mar 2025 01:01)  HR: 74 (13 Mar 2025 07:20) (71 - 80)  BP: 164/56 (13 Mar 2025 07:20) (135/56 - 164/56)  BP(mean): --  RR: 18 (13 Mar 2025 07:20) (18 - 18)  SpO2: 96% (13 Mar 2025 07:20) (96% - 97%)    Parameters below as of 13 Mar 2025 07:20  Patient On (Oxygen Delivery Method): nasal cannula  O2 Flow (L/min): 3    MEDICATIONS  (STANDING):  albuterol/ipratropium for Nebulization 3 milliLiter(s) Nebulizer every 6 hours  amLODIPine   Tablet 5 milliGRAM(s) Oral at bedtime  artificial  tears Solution 1 Drop(s) Both EYES daily  atorvastatin 40 milliGRAM(s) Oral at bedtime  azithromycin   Tablet 500 milliGRAM(s) Oral every 24 hours  chlorhexidine 2% Cloths 1 Application(s) Topical <User Schedule>  donepezil 10 milliGRAM(s) Oral at bedtime  enoxaparin Injectable 40 milliGRAM(s) SubCutaneous every 24 hours  fluticasone propionate/ salmeterol 250-50 MICROgram(s) Diskus 1 Dose(s) Inhalation two times a day  furosemide   Injectable 40 milliGRAM(s) IV Push daily  metoprolol tartrate 25 milliGRAM(s) Oral two times a day  pantoprazole    Tablet 40 milliGRAM(s) Oral before breakfast  tiotropium 2.5 MICROgram(s) Inhaler 2 Puff(s) Inhalation daily  traZODone 75 milliGRAM(s) Oral at bedtime    MEDICATIONS  (PRN):  acetaminophen     Tablet .. 650 milliGRAM(s) Oral every 6 hours PRN Mild Pain (1 - 3)    89-year-old female with past medical history of hypertension, hyperlipidemia, CHF, pulmonary hypertension, aortic stenosis, possible COPD was discharged yesterday 3/10 on 2L home oxygen, history of DVT not on anticoagulation, newly diagnosed esophageal mass presented for evaluation shortness of breath, desatting to 80s on 4L NC. Denies fever, cough sore throat, chills, nausea, vomiting, shortness of breath, abdominal pain, urinary complaints.     #Chronic hypoxic respiratory failure - stable   #Suspected COPD - do not believe to be in exacerbation   #suspected fluid overload  #HFpEF  #Severe Pulmonary Hypertension- likely due to mass compressing on pulmonary artery   - started lasix 40mg IV daily - continuetoday  - discontinue IV steroids today -switched to oral starting tomorrow morning   - continue nebs  - continue advair  - continue protonix  - continue azithro for 5 days today   - repeat cbc in am  - procal = 0.25  - s/s eval appreciated - regular/thins  - TTE 3/8/25:  EF 66%, severe Pulm HTN, mod AS   - ECG: Sinus bradycardia with 1st degree A-V block Rightward axis   - CXR - repeated 3/12- consistent with fluid overload   - patient is a former smoker - smoked for 60 years  - continue oxygen support at 2L NC    #Newly diagnosed mediastinal mass    - As family/daughter does not wish further work up for the mediastinal mass, hospice referral was made today  - Palliative evaluated patient last admission  - I completed GOC discussion with pt's daughter (HCP) on 3/12- DNR/DNI    #Hx HTN and HLD   - c/w home meds     #Dementia  - c/w donepezil  - frequent reorientation   - patient has short term memory forgetfulness. wanders at night  - Has 24hrs HHA at home    #GOC - DNR/DNI    Progress Note Handoff  Pending Consults: Hospice  Pending Tests: none  Pending Results: none  Family Discussion: Discussed labs, meds, oxygen, hospice cs and overall plan of care with pt, her daughter and granddaughter (on 3/12) and medical staff. PFD for 48hrs  Disposition: Home_x____/SNF______/Other_____/Unknown at this time_____  Spent 57 min reviewing chart, speaking with patient/family and on coordinating patient care during interdisciplinary rounds .
My note supersedes the resident's note in the event of a discrepancy -    Patient seen and examined this morning with her daughter and granddaughter bedside   lying in bed  in nad  states she feels much better     T(C): 36.2 (03-12-25 @ 07:25), Max: 37.7 (03-11-25 @ 16:11)  HR: 75 (03-12-25 @ 07:25) (68 - 78)  BP: 98/49 (03-12-25 @ 07:25) (98/49 - 140/53)  RR: 18 (03-12-25 @ 07:25) (18 - 18)  SpO2: 97% (03-12-25 @ 07:25) (94% - 98%)    albuterol/ipratropium for Nebulization 3 milliLiter(s) Nebulizer every 6 hours  amLODIPine   Tablet 5 milliGRAM(s) Oral at bedtime  artificial  tears Solution 1 Drop(s) Both EYES daily  atorvastatin 40 milliGRAM(s) Oral at bedtime  azithromycin   Tablet 500 milliGRAM(s) Oral every 24 hours  chlorhexidine 2% Cloths 1 Application(s) Topical <User Schedule>  donepezil 10 milliGRAM(s) Oral at bedtime  enoxaparin Injectable 40 milliGRAM(s) SubCutaneous every 24 hours  fluticasone propionate/ salmeterol 250-50 MICROgram(s) Diskus 1 Dose(s) Inhalation two times a day  methylPREDNISolone sodium succinate Injectable 40 milliGRAM(s) IV Push two times a day  metoprolol tartrate 25 milliGRAM(s) Oral two times a day  pantoprazole    Tablet 40 milliGRAM(s) Oral before breakfast  tiotropium 2.5 MICROgram(s) Inhaler 2 Puff(s) Inhalation daily  traZODone 75 milliGRAM(s) Oral at bedtime      89-year-old female with past medical history of hypertension, hyperlipidemia, CHF, pulmonary hypertension, aortic stenosis, possible COPD was discharged yesterday 3/10 on 2L home oxygen, history of DVT not on anticoagulation, newly diagnosed esophageal mass presented for evaluation shortness of breath, desatting to 80s on 4L NC. Denies fever, cough sore throat, chills, nausea, vomiting, shortness of breath, abdominal pain, urinary complaints.     #Acute hypoxic respiratory failure  #COPD - do not believe to be in exacerbation   #suspected fluid overload  #HFpEF  #Severe Pulmonary Hypertension- likely due to mass compressing on pulmonary artery   - started lasix 40mg IV daily  - continue IV steroids today -switch to oral in am  - continue nebs  - start advair  - start protonix  - dc cefepime and start azithro for 5 days  - repeat cbc in am  - procal pending  - s/s eval appreciated - regular/thins  - TTE 3/8/25:  EF 66%, severe Pulm HTN, mod AS   - ECG: Sinus bradycardia with 1st degree A-V block Rightward axis   - CXR - repeated today - consistent with fluid overload   - patient is a former smoker - smoked for 60 years  - continue oxygen support at 2L NC    #Newly diagnosed mediastinal mass    - As family/daughter does not wish further work up for the mediastinal mass, hospice referral was made today  - Palliative evaluated patient last admission  - I completed GOC discussion with pt's daughter (HCP) today - DNR/DNI    #Hx HTN and HLD   - c/w home meds     #Dementia  - c/w donepezil  - frequent reorientation   - patient has short term memory forgetfulness. wanders at night  - Has 24hrs HHA at home    #GOC - DNR/DNI    Progress Note Handoff  Pending Consults: Hospice  Pending Tests: none  Pending Results: none  Family Discussion: Discussed labs, meds, oxygen, hospice cs and overall plan of care with pt, her daughter and granddaughter and medical staff. PFD for 72hrs  Disposition: Home_x____/SNF______/Other_____/Unknown at this time_____  Spent 57 min reviewing chart, speaking with patient/family and on coordinating patient care during interdisciplinary rounds

## 2025-03-13 NOTE — CHART NOTE - NSCHARTNOTEFT_GEN_A_CORE
palliative team met with patient's daughter Matilda, and granddaughter Daphne. We reviewed CMO with family at length. Family wished to move forward with CMO, and would like to take patient home with hospice care as soon as possible. all questions answered. psychosocial support provided.     notified hospice team to f/u with Matilda.     bedside RN aware.     [phone]

## 2025-03-13 NOTE — PHYSICAL THERAPY INITIAL EVALUATION ADULT - ADDITIONAL COMMENTS
Patient is confused, poor historian. Lives with family. Claims to be independent in ambulation using RW

## 2025-03-13 NOTE — HOSPICE CARE NOTE - CONVESATION DETAILS
Update to D/C plan. Hospital bed pending delivery tomorrow morning between 9 AM and 1 pm. Patient to be d/C Friday once HHA services have been reinstated with [facility]. Hospice admission scheduled for Monday at 10 AM.

## 2025-03-13 NOTE — PHYSICAL THERAPY INITIAL EVALUATION ADULT - PERTINENT HX OF CURRENT PROBLEM, REHAB EVAL
89-year-old female with past medical history of hypertension, hyperlipidemia, CHF, pulmonary hypertension, aortic stenosis, possible COPD was discharged yesterday 3/10 on 2L home oxygen, history of DVT not on anticoagulation, newly diagnosed esophageal mass presented for evaluation shortness of breath, desatting to 80s on 4L NC. Denies fever, cough sore throat, chills, nausea, vomiting, shortness of breath, abdominal pain, urinary complaints.   Patient was found to have Mediastinal Mass, Subcarinal/Esophgeal Mass Suspicious for Neoplasia and severe pulmonary hypertension on CTA Chest on recent admission, daughter refused EGD/EUS and further work up, per daughter patient would not want to pursue chemo even if found to have cancer. Family planing to opt for hospice. Patient was discharged on 3/10/2025 with home oxygen. Patient is DNR/DNI.

## 2025-03-14 ENCOUNTER — TRANSCRIPTION ENCOUNTER (OUTPATIENT)
Age: 89
End: 2025-03-14

## 2025-03-14 PROCEDURE — 99231 SBSQ HOSP IP/OBS SF/LOW 25: CPT

## 2025-03-14 PROCEDURE — 99497 ADVNCD CARE PLAN 30 MIN: CPT | Mod: 25

## 2025-03-14 PROCEDURE — 99233 SBSQ HOSP IP/OBS HIGH 50: CPT

## 2025-03-14 RX ORDER — HALOPERIDOL 10 MG/1
0.5 TABLET ORAL EVERY 4 HOURS
Refills: 0 | Status: DISCONTINUED | OUTPATIENT
Start: 2025-03-14 | End: 2025-03-17

## 2025-03-14 RX ORDER — TRAZODONE HCL 100 MG
1 TABLET ORAL
Qty: 14 | Refills: 0
Start: 2025-03-14 | End: 2025-03-27

## 2025-03-14 RX ORDER — FUROSEMIDE 10 MG/ML
1 INJECTION INTRAMUSCULAR; INTRAVENOUS
Qty: 30 | Refills: 0
Start: 2025-03-14 | End: 2025-04-12

## 2025-03-14 RX ORDER — LORAZEPAM 4 MG/ML
0.5 VIAL (ML) INJECTION EVERY 4 HOURS
Refills: 0 | Status: DISCONTINUED | OUTPATIENT
Start: 2025-03-14 | End: 2025-03-17

## 2025-03-14 RX ORDER — ALBUTEROL SULFATE 2.5 MG/3ML
2 VIAL, NEBULIZER (ML) INHALATION
Qty: 1 | Refills: 0
Start: 2025-03-14 | End: 2025-04-12

## 2025-03-14 RX ADMIN — IPRATROPIUM BROMIDE AND ALBUTEROL SULFATE 3 MILLILITER(S): .5; 2.5 SOLUTION RESPIRATORY (INHALATION) at 02:48

## 2025-03-14 RX ADMIN — Medication 5 MILLIGRAM(S): at 21:15

## 2025-03-14 RX ADMIN — Medication 1 APPLICATION(S): at 06:11

## 2025-03-14 RX ADMIN — Medication 150 MILLIGRAM(S): at 21:08

## 2025-03-14 RX ADMIN — TIOTROPIUM BROMIDE INHALATION SPRAY 2 PUFF(S): 3.12 SPRAY, METERED RESPIRATORY (INHALATION) at 08:09

## 2025-03-14 RX ADMIN — IPRATROPIUM BROMIDE AND ALBUTEROL SULFATE 3 MILLILITER(S): .5; 2.5 SOLUTION RESPIRATORY (INHALATION) at 09:05

## 2025-03-14 RX ADMIN — DONEPEZIL HYDROCHLORIDE 10 MILLIGRAM(S): 5 TABLET ORAL at 21:08

## 2025-03-14 RX ADMIN — Medication 1 DOSE(S): at 09:08

## 2025-03-14 RX ADMIN — IPRATROPIUM BROMIDE AND ALBUTEROL SULFATE 3 MILLILITER(S): .5; 2.5 SOLUTION RESPIRATORY (INHALATION) at 20:47

## 2025-03-14 RX ADMIN — Medication 1 DROP(S): at 15:01

## 2025-03-14 RX ADMIN — Medication 5 MILLIGRAM(S): at 20:15

## 2025-03-14 NOTE — DISCHARGE NOTE PROVIDER - NSDCFUADDINST_GEN_ALL_CORE_FT
CMO. Please maximize comfort care CMO. Please maximize comfort care  Diet, Regular:   DASH/TLC {Sodium & Cholesterol Restricted} (DASH)  Mildly Thick Liquids (MILDTHICKLIQS) (03-12-25 @ 10:51) [Active]

## 2025-03-14 NOTE — DISCHARGE NOTE PROVIDER - NSDCMRMEDTOKEN_GEN_ALL_CORE_FT
Albuterol (Eqv-ProAir HFA) 90 mcg/inh inhalation aerosol: 2 puff(s) inhaled 4 times a day as needed for  shortness of breath and/or wheezing  amLODIPine 5 mg oral tablet: 1.5 tab(s) orally once a day (at bedtime)  Breztri Aerosphere 160 mcg-9 mcg-4.8 mcg/inh inhalation aerosol: 1 puff(s) inhaled 2 times a day  donepezil 10 mg oral tablet: 1 tab(s) orally once a day  Lasix 80 mg oral tablet: 1 tab(s) orally once a day Please take 1 tab once a day  Lipitor 40 mg oral tablet: 1 tab(s) orally once a day  metoprolol: 25 milligram(s) orally 2 times a day  polyethylene glycol 3350 oral powder for reconstitution: 17 gram(s) orally 2 times a day  Retaine MGD ophthalmic emulsion: 1 application to each affected eye once a day (at bedtime)  senna leaf extract oral tablet: 2 tab(s) orally once a day (at bedtime)  traZODone 150 mg oral tablet: 0.5 orally once a day (at bedtime)  traZODone 150 mg oral tablet: 1 tab(s) orally once a day (at bedtime)   Albuterol (Eqv-ProAir HFA) 90 mcg/inh inhalation aerosol: 2 puff(s) inhaled 4 times a day as needed for  shortness of breath and/or wheezing  Breztri Aerosphere 160 mcg-9 mcg-4.8 mcg/inh inhalation aerosol: 1 puff(s) inhaled 2 times a day  donepezil 10 mg oral tablet: 1 tab(s) orally once a day (at bedtime)  haloperidol 2 mg/mL oral concentrate: 0.25 milliliter(s) orally every 4 hours As needed Agitation or restlessness  ipratropium-albuterol 0.5 mg-2.5 mg/3 mL inhalation solution: 3 milliliter(s) inhaled every 6 hours  LORazepam 2 mg/mL oral concentrate: 0.25 milliliter(s) orally every 4 hours as needed for  anxiety  morphine 20 mg/mL oral concentrate: 5 milligram(s) sublingually every 3 hours as needed for  severe pain  Retaine MGD ophthalmic emulsion: 1 application to each affected eye once a day (at bedtime)  tiotropium 2.5 mcg/inh inhalation aerosol: 2 puff(s) inhaled once a day  traZODone 150 mg oral tablet: 1 tab(s) orally once a day (at bedtime)   Albuterol (Eqv-ProAir HFA) 90 mcg/inh inhalation aerosol: 2 puff(s) inhaled 4 times a day as needed for  shortness of breath and/or wheezing  Breztri Aerosphere 160 mcg-9 mcg-4.8 mcg/inh inhalation aerosol: 1 puff(s) inhaled 2 times a day  donepezil 10 mg oral tablet: 1 tab(s) orally once a day (at bedtime)  haloperidol 2 mg/mL oral concentrate: 0.25 milliliter(s) orally every 4 hours As needed Agitation or restlessness  ipratropium-albuterol 0.5 mg-2.5 mg/3 mL inhalation solution: 3 milliliter(s) inhaled every 6 hours  Lasix 40 mg oral tablet: 1 tab(s) orally once a day  LORazepam 2 mg/mL oral concentrate: 0.25 milliliter(s) orally every 4 hours as needed for  anxiety  morphine 20 mg/mL oral concentrate: 5 milligram(s) sublingually every 3 hours as needed for  severe pain  Retaine MGD ophthalmic emulsion: 1 application to each affected eye once a day (at bedtime)  tiotropium 2.5 mcg/inh inhalation aerosol: 2 puff(s) inhaled once a day  traZODone 150 mg oral tablet: 1 tab(s) orally once a day (at bedtime)

## 2025-03-14 NOTE — PROGRESS NOTE ADULT - PROBLEM SELECTOR PLAN 3
no workup   family does not want bx or treatment as per patient wishes  plan for home hospice - will need 3 day supply of meds sent to [facility] for d/c
no workup   family does not want bx or treatment as per patient wishes  plan for home hospice - will need 3 day supply of meds sent to [facility] for d/c

## 2025-03-14 NOTE — PROGRESS NOTE ADULT - PROBLEM SELECTOR PLAN 2
continue prednisone, lasix, nebs, inhalers for now  continue nasal cannula  transition lasix to PO prior to d/c tomorrow  no pulse ox monitoring and no face masks
continue prednisone, lasix, nebs, inhalers for now  continue nasal cannula  transition lasix to PO prior to d/c tomorrow  no pulse ox monitoring and no face masks

## 2025-03-14 NOTE — DISCHARGE NOTE PROVIDER - CARE PROVIDER_API CALL
YENI PARRISH  [address] [place] [address]  Phone: ()-  Fax: ()-  Established Patient  Follow Up Time: 2 weeks   YENI PARRISH  [address] [place] [address]  Phone: ()-  Fax: ()-  Established Patient  Follow Up Time: 2 weeks    Hospice,   Phone: (   )    -  Fax: (   )    -  Follow Up Time: 1-3 days

## 2025-03-14 NOTE — DISCHARGE NOTE PROVIDER - CARE PROVIDERS DIRECT ADDRESSES
,[email or web address],DirectAddress_Unknown

## 2025-03-14 NOTE — DISCHARGE NOTE PROVIDER - PROVIDER TOKENS
PROVIDER:[TOKEN:[[id number]],FOLLOWUP:[2 weeks],ESTABLISHEDPATIENT:[T]] PROVIDER:[TOKEN:[[id number]],FOLLOWUP:[2 weeks],ESTABLISHEDPATIENT:[T]],FREE:[LAST:[Hospice],PHONE:[(   )    -],FAX:[(   )    -],FOLLOWUP:[1-3 days]]

## 2025-03-14 NOTE — DISCHARGE NOTE PROVIDER - NSDCCPCAREPLAN_GEN_ALL_CORE_FT
PRINCIPAL DISCHARGE DIAGNOSIS  Diagnosis: Fluid overload  Assessment and Plan of Treatment: You came to the hospital because you were having trouble breathing.   We gave you medicine to help with your breathing, including medication to get rid of extra fluid and breathing treatments.  You also have a new mass in your chest, but your family has decided not to do any more tests for it.  We also talked with your family, and everyone agrees that the best plan is to focus on keeping you comfortable. We won't be doing any more blood tests or giving you any IV fluids. You'll continue to use oxygen at home with a goal to optimize comfort. We'll also give you medication to help with any pain or anxiety you might have. You’re now ready to go home.      SECONDARY DISCHARGE DIAGNOSES  Diagnosis: Hypoxia  Assessment and Plan of Treatment:      PRINCIPAL DISCHARGE DIAGNOSIS  Diagnosis: Fluid overload  Assessment and Plan of Treatment: You came to the hospital because you were having trouble breathing.   We gave you medicine to help with your breathing, including medication to get rid of extra fluid and breathing treatments.  You also have a new mass in your chest, but your family has decided not to do any more tests for it.  We also talked with your family, and everyone agrees that the best plan is to focus on keeping you comfortable. We won't be doing any more blood tests or giving you any IV fluids. You'll continue to use oxygen at home with a goal to optimize comfort. We'll also give you medication to help with any pain or anxiety you might have. You’re now ready to go home.

## 2025-03-14 NOTE — DISCHARGE NOTE PROVIDER - HOSPITAL COURSE
This 89-year-old female with a history of hypertension, hyperlipidemia, congestive heart failure, pulmonary hypertension, aortic stenosis, possible COPD, remote DVT, and a newly diagnosed esophageal mass presented with shortness of breath and desaturation. she was treated for COPD excaberation vs a fluid overload with diuretics, steroids, and nebulizers. Sh vitor also had a new medistenal mass that was an incidental finding and family wished for no further workup. She was transitioned to comfort measures only after discussion with palliative with discontinuation of labs, intravenous fluids, artificial feeding, and most vital sign monitoring.  She is being discharged with prescriptions for pain and anxiety and instructions to continue home oxygen. Patient is stable for discharge for comfort measures.    #Chronic hypoxic respiratory failure - stable   #Suspected COPD - do not believe to be in exacerbation   #suspected fluid overload  #HFpEF  #Severe Pulmonary Hypertension- likely due to mass compressing on pulmonary artery   - started lasix 40mg IV daily > transition oral on dc for comfort  - continue nebs  - continue advair  - dc abx and steroids   - procal = 0.25  - s/s eval appreciated - regular/thins  - TTE 3/8/25:  EF 66%, severe Pulm HTN, mod AS   - ECG: Sinus bradycardia with 1st degree A-V block Rightward axis   - CXR - repeated 3/12- consistent with fluid overload   - patient is a former smoker - smoked for 60 years  - continue oxygen support at 2L NC    #Newly diagnosed mediastinal mass    - As family/daughter does not wish further work up for the mediastinal mass, hospice referral was made today  - Palliative evaluated patient last admission    #Hx HTN and HLD   - c/w home meds     #Dementia  - c/w donepezil      #GOC - Patient was finally made CMO after palliative follow up with the family. Maximize comfort   - no labs  - no IVF  - no artificial feeding  - vitals once per day  - continue Nasal cannula ( no face masks)  - no injections  - no FS  - continue oral meds for now   Roxanol 5 mg SL Q 3 H PRN for pain or dyspnea  Haldol 0.5 mg IVP Q 6 H PRN for agitation- change to SL tomorrow AM prior to d/c  Ativan 0.5 mg IVP Q 4 H PRN for agitation- change to SL tomorrow AM prior to d/c   increased Trazodone from 75 mg to 150 mg (home dose) QHS.    dc planning discussed with Dr Pantoja on 3/14    This 89-year-old female with a history of hypertension, hyperlipidemia, congestive heart failure, pulmonary hypertension, aortic stenosis, possible COPD, remote DVT, and a newly diagnosed esophageal mass presented with shortness of breath and desaturation. she was treated for COPD excaberation vs a fluid overload with diuretics, steroids, and nebulizers. She also had a new mediastinal mass that was an incidental finding and family wished for no further workup. She was transitioned to comfort measures only after discussion with palliative with discontinuation of labs, intravenous fluids, artificial feeding, and most vital sign monitoring.  She is being discharged with prescriptions for pain and anxiety and instructions to continue home oxygen. Patient is stable for discharge for comfort measures.    #Chronic hypoxic respiratory failure - stable   #Suspected COPD - do not believe to be in exacerbation   #Suspected fluid overload  #HFpEF  #Severe Pulmonary Hypertension- likely due to mass compressing on pulmonary artery   - started lasix 40mg IV daily > transition oral on dc for comfort  - continue nebs  - continue advair  - dc abx and steroids   - procal = 0.25  - s/s eval appreciated - regular/mildly thick liquids  - TTE 3/8/25:  EF 66%, severe Pulm HTN, mod AS   - ECG: Sinus bradycardia with 1st degree A-V block Rightward axis   - CXR - repeated 3/12- consistent with fluid overload   - patient is a former smoker - smoked for 60 years  - continue oxygen support as needed    #Newly diagnosed mediastinal mass    - As family/daughter does not wish further work up for the mediastinal mass, hospice referral was made today  - Palliative evaluated patient     #Hx HTN and HLD   - c/w home meds     #Dementia  - c/w donepezil      #GOC - Patient was finally made CMO after palliative follow up with the family. Maximize comfort   - no labs  - no IVF  - no artificial feeding  - vitals once per day  - continue Nasal cannula ( no face masks)  - no injections  - no FS  - continue oral meds for now   Roxanol 5 mg SL Q 3 H PRN for pain or dyspnea  Haldol 0.5 mg IVP Q 6 H PRN for agitation- change to SL tomorrow AM prior to d/c  Ativan 0.5 mg IVP Q 4 H PRN for agitation- change to SL tomorrow AM prior to d/c   increased Trazodone from 75 mg to 150 mg (home dose) QHS.    dc planning discussed with Dr Pantoja on 3/17    Medicine attending -    Patient seen and examined this morning  lying comfortably in bed on oxygen  on hospice  no complaints    This 89-year-old female with a history of hypertension, hyperlipidemia, congestive heart failure, pulmonary hypertension, aortic stenosis, possible COPD, remote DVT, and a newly diagnosed esophageal mass presented with shortness of breath and desaturation. she was treated for COPD excaberation vs a fluid overload with diuretics, steroids, and nebulizers. She also had a new mediastinal mass that was an incidental finding and family wished for no further workup. She was transitioned to comfort measures only after discussion with palliative with discontinuation of labs, intravenous fluids, artificial feeding, and most vital sign monitoring.  She is being discharged with prescriptions for pain and anxiety and instructions to continue home oxygen. Patient is stable for discharge for comfort measures.    #Chronic hypoxic respiratory failure - stable   #Suspected COPD - do not believe to be in exacerbation   #Suspected fluid overload  #HFpEF  #Severe Pulmonary Hypertension- likely due to mass compressing on pulmonary artery   - started lasix 40mg IV daily > transition oral on dc for comfort  - continue nebs  - continue advair  - dc abx and steroids   - procal = 0.25  - s/s eval appreciated - regular/mildly thick liquids  - TTE 3/8/25:  EF 66%, severe Pulm HTN, mod AS   - ECG: Sinus bradycardia with 1st degree A-V block Rightward axis   - CXR - repeated 3/12- consistent with fluid overload   - patient is a former smoker - smoked for 60 years  - continue oxygen support as needed    #Newly diagnosed mediastinal mass    - As family/daughter does not wish further work up for the mediastinal mass, hospice referral was made today  - Palliative evaluated patient     #Hx HTN and HLD   - c/w home meds     #Dementia  - c/w donepezil    #GOC - Patient was finally made CMO after palliative follow up with the family. Maximize comfort   - no labs  - no IVF  - no artificial feeding  - vitals once per day  - continue Nasal cannula ( no face masks)  - no injections  - no FS  - continue oral meds for now   Roxanol 5 mg SL Q 3 H PRN for pain or dyspnea  Haldol 0.5 mg IVP Q 6 H PRN for agitation- change to SL tomorrow AM prior to d/c  Ativan 0.5 mg IVP Q 4 H PRN for agitation- change to SL tomorrow AM prior to d/c   increased Trazodone from 75 mg to 150 mg (home dose) QHS.    dc planning discussed with Dr Pantoja on 3/17     D/C today; d/c planning took over 75 min  d/c papers edited by me  discussed d/c plan and all instructions in detail

## 2025-03-15 PROCEDURE — 99232 SBSQ HOSP IP/OBS MODERATE 35: CPT

## 2025-03-15 RX ORDER — FUROSEMIDE 10 MG/ML
40 INJECTION INTRAMUSCULAR; INTRAVENOUS DAILY
Refills: 0 | Status: DISCONTINUED | OUTPATIENT
Start: 2025-03-16 | End: 2025-03-16

## 2025-03-15 RX ADMIN — Medication 1 APPLICATION(S): at 05:37

## 2025-03-15 RX ADMIN — Medication 650 MILLIGRAM(S): at 13:46

## 2025-03-15 RX ADMIN — TIOTROPIUM BROMIDE INHALATION SPRAY 2 PUFF(S): 3.12 SPRAY, METERED RESPIRATORY (INHALATION) at 08:34

## 2025-03-15 RX ADMIN — IPRATROPIUM BROMIDE AND ALBUTEROL SULFATE 3 MILLILITER(S): .5; 2.5 SOLUTION RESPIRATORY (INHALATION) at 14:43

## 2025-03-15 RX ADMIN — IPRATROPIUM BROMIDE AND ALBUTEROL SULFATE 3 MILLILITER(S): .5; 2.5 SOLUTION RESPIRATORY (INHALATION) at 23:24

## 2025-03-15 RX ADMIN — Medication 650 MILLIGRAM(S): at 13:16

## 2025-03-15 RX ADMIN — Medication 1 DROP(S): at 11:33

## 2025-03-15 RX ADMIN — FUROSEMIDE 40 MILLIGRAM(S): 10 INJECTION INTRAMUSCULAR; INTRAVENOUS at 05:37

## 2025-03-15 RX ADMIN — IPRATROPIUM BROMIDE AND ALBUTEROL SULFATE 3 MILLILITER(S): .5; 2.5 SOLUTION RESPIRATORY (INHALATION) at 08:34

## 2025-03-16 PROCEDURE — 99232 SBSQ HOSP IP/OBS MODERATE 35: CPT

## 2025-03-16 RX ORDER — DONEPEZIL HYDROCHLORIDE 5 MG/1
10 TABLET ORAL AT BEDTIME
Refills: 0 | Status: DISCONTINUED | OUTPATIENT
Start: 2025-03-16 | End: 2025-03-17

## 2025-03-16 RX ADMIN — TIOTROPIUM BROMIDE INHALATION SPRAY 2 PUFF(S): 3.12 SPRAY, METERED RESPIRATORY (INHALATION) at 08:45

## 2025-03-16 RX ADMIN — DONEPEZIL HYDROCHLORIDE 10 MILLIGRAM(S): 5 TABLET ORAL at 00:11

## 2025-03-16 RX ADMIN — IPRATROPIUM BROMIDE AND ALBUTEROL SULFATE 3 MILLILITER(S): .5; 2.5 SOLUTION RESPIRATORY (INHALATION) at 19:34

## 2025-03-16 RX ADMIN — Medication 650 MILLIGRAM(S): at 21:16

## 2025-03-16 RX ADMIN — Medication 1 DROP(S): at 11:17

## 2025-03-16 RX ADMIN — Medication 1 DOSE(S): at 11:18

## 2025-03-16 RX ADMIN — Medication 150 MILLIGRAM(S): at 21:16

## 2025-03-16 RX ADMIN — Medication 650 MILLIGRAM(S): at 06:38

## 2025-03-16 RX ADMIN — Medication 1 APPLICATION(S): at 05:21

## 2025-03-16 RX ADMIN — Medication 5 MILLIGRAM(S): at 08:03

## 2025-03-16 RX ADMIN — FUROSEMIDE 40 MILLIGRAM(S): 10 INJECTION INTRAMUSCULAR; INTRAVENOUS at 05:21

## 2025-03-16 RX ADMIN — Medication 650 MILLIGRAM(S): at 00:12

## 2025-03-16 RX ADMIN — Medication 150 MILLIGRAM(S): at 00:11

## 2025-03-16 RX ADMIN — DONEPEZIL HYDROCHLORIDE 10 MILLIGRAM(S): 5 TABLET ORAL at 21:15

## 2025-03-16 RX ADMIN — Medication 5 MILLIGRAM(S): at 07:30

## 2025-03-16 RX ADMIN — Medication 650 MILLIGRAM(S): at 02:14

## 2025-03-17 ENCOUNTER — TRANSCRIPTION ENCOUNTER (OUTPATIENT)
Age: 89
End: 2025-03-17

## 2025-03-17 PROCEDURE — 99239 HOSP IP/OBS DSCHRG MGMT >30: CPT

## 2025-03-17 RX ORDER — TRAZODONE HCL 100 MG
1 TABLET ORAL
Qty: 0 | Refills: 0 | DISCHARGE
Start: 2025-03-17

## 2025-03-17 RX ORDER — DONEPEZIL HYDROCHLORIDE 5 MG/1
1 TABLET ORAL
Qty: 0 | Refills: 0 | DISCHARGE
Start: 2025-03-17

## 2025-03-17 RX ORDER — ALBUTEROL SULFATE 2.5 MG/3ML
2 VIAL, NEBULIZER (ML) INHALATION
Qty: 1 | Refills: 0
Start: 2025-03-17 | End: 2025-04-15

## 2025-03-17 RX ORDER — HALOPERIDOL 10 MG/1
0.25 TABLET ORAL
Qty: 0 | Refills: 0 | DISCHARGE
Start: 2025-03-17

## 2025-03-17 RX ORDER — FUROSEMIDE 10 MG/ML
1 INJECTION INTRAMUSCULAR; INTRAVENOUS
Qty: 30 | Refills: 0
Start: 2025-03-17

## 2025-03-17 RX ORDER — LORAZEPAM 4 MG/ML
0.25 VIAL (ML) INJECTION
Qty: 0 | Refills: 0 | DISCHARGE
Start: 2025-03-17

## 2025-03-17 RX ORDER — TIOTROPIUM BROMIDE INHALATION SPRAY 3.12 UG/1
2 SPRAY, METERED RESPIRATORY (INHALATION)
Qty: 0 | Refills: 0 | DISCHARGE
Start: 2025-03-17

## 2025-03-17 RX ORDER — IPRATROPIUM BROMIDE AND ALBUTEROL SULFATE .5; 2.5 MG/3ML; MG/3ML
3 SOLUTION RESPIRATORY (INHALATION)
Qty: 0 | Refills: 0 | DISCHARGE
Start: 2025-03-17

## 2025-03-17 RX ADMIN — IPRATROPIUM BROMIDE AND ALBUTEROL SULFATE 3 MILLILITER(S): .5; 2.5 SOLUTION RESPIRATORY (INHALATION) at 08:04

## 2025-03-17 RX ADMIN — IPRATROPIUM BROMIDE AND ALBUTEROL SULFATE 3 MILLILITER(S): .5; 2.5 SOLUTION RESPIRATORY (INHALATION) at 02:05

## 2025-03-17 RX ADMIN — Medication 1 APPLICATION(S): at 05:00

## 2025-03-17 RX ADMIN — Medication 650 MILLIGRAM(S): at 05:00

## 2025-03-17 NOTE — DISCHARGE NOTE NURSING/CASE MANAGEMENT/SOCIAL WORK - FINANCIAL ASSISTANCE
[facility] provides services at a reduced cost to those who are determined to be eligible through [facility]’s financial assistance program. Information regarding [facility]’s financial assistance program can be found by going to [email or web address] or by calling [phone].

## 2025-03-17 NOTE — PROGRESS NOTE ADULT - PROVIDER SPECIALTY LIST ADULT
Internal Medicine
Hospitalist
Internal Medicine
Hospitalist
Hospitalist
Palliative Care
Palliative Care

## 2025-03-17 NOTE — DISCHARGE NOTE NURSING/CASE MANAGEMENT/SOCIAL WORK - PATIENT PORTAL LINK FT
You can access the FollowMyHealth Patient Portal offered by [facility] by registering at the following website: [email or web address]. By joining [employer]’s FollowMyHealth portal, you will also be able to view your health information using other applications (apps) compatible with our system.

## 2025-03-17 NOTE — PROGRESS NOTE ADULT - SUBJECTIVE AND OBJECTIVE BOX
NIDIA CAT  89y  Female      Patient is a 89y old  Female who presents with a chief complaint of SOB (14 Mar 2025 10:31)    INTERVAL HPI/OVERNIGHT EVENTS:  Patient seen and examined earlier this morning  lying comfortably in bed  on 1:1 sit as per family's request  on CMO status  comfortable; no overnight events   patient will be discharged early Monday morning for an intake yordan on Monday at 10am with hospice at home     Vital Signs Last 24 Hrs  T(C): --  T(F): --  HR: --  BP: --  BP(mean): --  RR: --  SpO2: --      PHYSICAL EXAM:  GENERAL: NAD, well-groomed,   HEAD:  Atraumatic, Normocephalic  EYES: conjunctiva and sclera clear  ENMT: Moist mucous membranes,  No visible lesions  NECK: Supple, No JVD, Normal thyroid  NERVOUS SYSTEM:  wakes up to verbal stimuli   CHEST/LUNG: mild ronchi   HEART: Regular rate and rhythm; No murmurs, rubs, or gallops  ABDOMEN: Soft, Nontender, Nondistended; Bowel sounds present  EXTREMITIES:  2+ Peripheral Pulses, No clubbing, cyanosis, or edema  LYMPH: No lymphadenopathy noted  SKIN: No rashes or lesions    Consultant(s) Notes Reviewed:  [x ] YES  [ ] NO  Care Discussed with Consultants/Other Providers [ x] YES  [ ] NO    LAB:                        12.2   20.44 )-----------( 215      ( 13 Mar 2025 06:51 )             36.8         Drug Dosing Weight  Height (cm): 170.2 (12 Mar 2025 00:40)  Weight (kg): 64.4 (12 Mar 2025 00:40)  BMI (kg/m2): 22.2 (12 Mar 2025 00:40)  BSA (m2): 1.75 (12 Mar 2025 00:40)      RADIOLOGY & ADDITIONAL TESTS:  Imaging Personally Reviewed:  [x] YES  [ ] NO    HEALTH ISSUES - PROBLEM Dx:  Acute respiratory failure with hypoxia    Mediastinal mass    Dementia    Encounter for palliative care    Comfort measures only status      MEDICATIONS  (STANDING):  albuterol/ipratropium for Nebulization 3 milliLiter(s) Nebulizer every 6 hours  artificial  tears Solution 1 Drop(s) Both EYES daily  chlorhexidine 2% Cloths 1 Application(s) Topical <User Schedule>  donepezil 10 milliGRAM(s) Oral at bedtime  fluticasone propionate/ salmeterol 250-50 MICROgram(s) Diskus 1 Dose(s) Inhalation two times a day  furosemide    Tablet 40 milliGRAM(s) Oral daily  tiotropium 2.5 MICROgram(s) Inhaler 2 Puff(s) Inhalation daily  traZODone 150 milliGRAM(s) Oral at bedtime    MEDICATIONS  (PRN):  acetaminophen     Tablet .. 650 milliGRAM(s) Oral every 6 hours PRN Mild Pain (1 - 3)  haloperidol    Concentrate 0.5 milliGRAM(s) Oral every 4 hours PRN Agitation or restlessness  LORazepam    Concentrate 0.5 milliGRAM(s) SubLingual every 4 hours PRN Anxiety  morphine Concentrate 5 milliGRAM(s) SubLingual every 3 hours PRN dyspnea or pain      
SUBJECTIVE/OVERNIGHT EVENTS  Today is hospital day 6d. This morning patient was seen and examined at bedside, resting comfortably in bed. No acute or major events overnight. Patient has no complaints at this time.     CODE STATUS: DNR/DNI/CMO    MEDICATIONS  STANDING MEDICATIONS  albuterol/ipratropium for Nebulization 3 milliLiter(s) Nebulizer every 6 hours  artificial  tears Solution 1 Drop(s) Both EYES daily  chlorhexidine 2% Cloths 1 Application(s) Topical <User Schedule>  donepezil 10 milliGRAM(s) Oral at bedtime  fluticasone propionate/ salmeterol 250-50 MICROgram(s) Diskus 1 Dose(s) Inhalation two times a day  tiotropium 2.5 MICROgram(s) Inhaler 2 Puff(s) Inhalation daily  traZODone 150 milliGRAM(s) Oral at bedtime    PRN MEDICATIONS  acetaminophen     Tablet .. 650 milliGRAM(s) Oral every 6 hours PRN  haloperidol    Concentrate 0.5 milliGRAM(s) Oral every 4 hours PRN  LORazepam    Concentrate 0.5 milliGRAM(s) SubLingual every 4 hours PRN  morphine Concentrate 5 milliGRAM(s) SubLingual every 3 hours PRN    PHYSICAL EXAM  GENERAL  ( X ) NAD, lying in bed comfortably     (  ) obtunded     (  ) lethargic     (  ) somnolent    HEAD  ( X ) Atraumatic     (  ) hematoma     (  ) laceration (specify location:       )     NECK  ( X ) Supple     (  ) neck stiffness     (  ) nuchal rigidity     (  )  no JVD     (  ) JVD present ( -- cm)    HEART  Rate -->  ( X ) normal rate    (  ) bradycardic    (  ) tachycardic  Rhythm -->  ( X ) regular    (  ) regularly irregular    (  ) irregularly irregular  Murmurs -->  ( X ) normal s1/s2    (  ) systolic murmur    (  ) diastolic murmur    (  ) continuous murmur     (  ) S3 present    (  ) S4 present    LUNGS  ( X )Unlabored respirations     (  ) tachypnea  ( X ) B/L air entry     (  ) decreased breath sounds in:  (location     )    (  ) no adventitious sound     (  ) crackles     (  ) wheezing      (  ) rhonchi      (specify location:       )  (  ) chest wall tenderness (specify location:       )    ABDOMEN  ( X ) Soft     (  ) tense   |   ( X ) nondistended     (  ) distended   |   ( X ) +BS     (  ) hypoactive bowel sounds     (  ) hyperactive bowel sounds  ( X ) nontender     (  ) RUQ tenderness     (  ) RLQ tenderness     (  ) LLQ tenderness     (  ) epigastric tenderness     (  ) diffuse tenderness  (  ) Splenomegaly      (  ) Hepatomegaly      (  ) Jaundice     (  ) ecchymosis     EXTREMITIES  ( X ) Normal     (  ) Rash     (  ) ecchymosis     (  ) varicose veins      (  ) pitting edema     (  ) non-pitting edema   (  ) ulceration     (  ) gangrene:     (location:     )    SKIN  ( X ) No rashes or lesions     (  ) maculopapular rash     (  ) pustules     (  ) vesicles     (  ) ulcer     (  ) ecchymosis     (specify location:     )  
  NIDIA CAT  89y  Female      Patient is a 89y old  Female who presents with a chief complaint of SOB (14 Mar 2025 10:31)      INTERVAL HPI/OVERNIGHT EVENTS:  Patient seen and examined earlier this morning  lying comfortably in bed  on 1:1 sit as per family's request  on CMO status  comfortable; no overnight events   patient will be discharged early Monday morning for an intake yordan on Monday at 10am with hospice at home     Vital Signs Last 24 Hrs  T(C): --  T(F): --  HR: --  BP: --  BP(mean): --  RR: --  SpO2: --      PHYSICAL EXAM:  GENERAL: NAD, well-groomed,   HEAD:  Atraumatic, Normocephalic  EYES: conjunctiva and sclera clear  ENMT: Moist mucous membranes,  No visible lesions  NECK: Supple, No JVD, Normal thyroid  NERVOUS SYSTEM:  sleepy but arousable   CHEST/LUNG: mild ronchi   HEART: Regular rate and rhythm; No murmurs, rubs, or gallops  ABDOMEN: Soft, Nontender, Nondistended; Bowel sounds present  EXTREMITIES:  2+ Peripheral Pulses, No clubbing, cyanosis, or edema  LYMPH: No lymphadenopathy noted  SKIN: No rashes or lesions    Consultant(s) Notes Reviewed:  [x ] YES  [ ] NO  Care Discussed with Consultants/Other Providers [ x] YES  [ ] NO    LAB:                        12.2   20.44 )-----------( 215      ( 13 Mar 2025 06:51 )             36.8         Drug Dosing Weight  Height (cm): 170.2 (12 Mar 2025 00:40)  Weight (kg): 64.4 (12 Mar 2025 00:40)  BMI (kg/m2): 22.2 (12 Mar 2025 00:40)  BSA (m2): 1.75 (12 Mar 2025 00:40)      RADIOLOGY & ADDITIONAL TESTS:  Imaging Personally Reviewed:  [x] YES  [ ] NO    HEALTH ISSUES - PROBLEM Dx:  Acute respiratory failure with hypoxia    Mediastinal mass    Dementia    Encounter for palliative care    Comfort measures only status      MEDICATIONS  (STANDING):  albuterol/ipratropium for Nebulization 3 milliLiter(s) Nebulizer every 6 hours  artificial  tears Solution 1 Drop(s) Both EYES daily  chlorhexidine 2% Cloths 1 Application(s) Topical <User Schedule>  donepezil 10 milliGRAM(s) Oral at bedtime  fluticasone propionate/ salmeterol 250-50 MICROgram(s) Diskus 1 Dose(s) Inhalation two times a day  furosemide   Injectable 40 milliGRAM(s) IV Push daily  tiotropium 2.5 MICROgram(s) Inhaler 2 Puff(s) Inhalation daily  traZODone 150 milliGRAM(s) Oral at bedtime    MEDICATIONS  (PRN):  acetaminophen     Tablet .. 650 milliGRAM(s) Oral every 6 hours PRN Mild Pain (1 - 3)  haloperidol    Concentrate 0.5 milliGRAM(s) Oral every 4 hours PRN Agitation or restlessness  LORazepam    Concentrate 0.5 milliGRAM(s) SubLingual every 4 hours PRN Anxiety  morphine Concentrate 5 milliGRAM(s) SubLingual every 3 hours PRN dyspnea or pain    
HPI:    89-year-old female with past medical history of hypertension, hyperlipidemia, CHF, pulmonary hypertension, aortic stenosis, possible COPD was discharged yesterday 3/10 on 2L home oxygen, history of DVT not on anticoagulation, newly diagnosed esophageal mass presented for evaluation shortness of breath, desatting to 80s on 4L NC. Denies fever, cough sore throat, chills, nausea, vomiting, shortness of breath, abdominal pain, urinary complaints.   Patient was found to have Mediastinal Mass, Subcarinal/Esophgeal Mass Suspicious for Neoplasia and severe pulmonary hypertension on CTA Chest on recent admission, daughter refused EGD/EUS and further work up, per daughter patient would not want to pursue chemo even if found to have cancer. Family planing to opt for hospice. Patient was discharged on 3/10/2025 with home oxygen. Patient is DNR/DNI.     Granddaughter at bedside states that she also noticed increased secretions and some gurgling sound.    VITAL SIGNS:  BP: 110/65  Heart Rate: 68 /min  Respiration Rate: 18 /min  SpO2 (%): 98 % on mask, nonrebreather  Oxygen Therapy Flow (L/min)	10 L/min    LABS:   WBC: 21.62  Hb: 11.7  T. bili: 1.3    RVP negative    VBG: pH: 7.38  pCO2: 50  pO2: 46  HCO3: 30      CXR: looks similar to previous, no new changes    s/p duonebs and solumedrol 80mg IVP in ED.  (11 Mar 2025 23:58)      Interval history:     - patient seen at bedside, sleeping comfortably. HHA at bedside. no family present. plan for d/c home today with hospice.      ADVANCE DIRECTIVES:     [ ] Full Code [ X] DNR/DNI  MOLST  [ ]  Living Will  [ ]   DECISION MAKER(s): Matilda (daughter) is HCP  [ X ] Health Care Proxy(s)  [ ] Surrogate(s)  [ ] Guardian           Name(s): Phone Number(s):      BASELINE (I)ADL(s) (prior to admission):    [place]: [ ]Total  [X ] Moderate [ ]Dependent  Palliative Performance Status Version 2:         %    http://npcrc.org/files/news/palliative_performance_scale_[email or web address].pdf    Allergies    No Known Allergies    Intolerances    MEDICATIONS  (STANDING):  albuterol/ipratropium for Nebulization 3 milliLiter(s) Nebulizer every 6 hours  amLODIPine   Tablet 5 milliGRAM(s) Oral at bedtime  artificial  tears Solution 1 Drop(s) Both EYES daily  atorvastatin 40 milliGRAM(s) Oral at bedtime  azithromycin   Tablet 500 milliGRAM(s) Oral every 24 hours  chlorhexidine 2% Cloths 1 Application(s) Topical <User Schedule>  donepezil 10 milliGRAM(s) Oral at bedtime  fluticasone propionate/ salmeterol 250-50 MICROgram(s) Diskus 1 Dose(s) Inhalation two times a day  furosemide   Injectable 40 milliGRAM(s) IV Push daily  metoprolol tartrate 25 milliGRAM(s) Oral two times a day  pantoprazole    Tablet 40 milliGRAM(s) Oral before breakfast  tiotropium 2.5 MICROgram(s) Inhaler 2 Puff(s) Inhalation daily  traZODone 150 milliGRAM(s) Oral at bedtime    MEDICATIONS  (PRN):  acetaminophen     Tablet .. 650 milliGRAM(s) Oral every 6 hours PRN Mild Pain (1 - 3)  haloperidol    Injectable 0.5 milliGRAM(s) IV Push every 6 hours PRN agitation  LORazepam   Injectable 0.5 milliGRAM(s) IV Push every 4 hours PRN Agitation  morphine Concentrate 5 milliGRAM(s) SubLingual every 3 hours PRN dyspnea or pain    PRESENT SYMPTOMS: [X ]Unable to obtain due to poor mentation   Source if other than patient:  [ ]Family   [ ]Team     Pain: [ ]yes [ ]no  QOL impact -   Location -                    Aggravating factors -  Quality -  Radiation -  Timing-  Severity (0-10 scale):  Minimal acceptable level (0-10 scale):     CPOT:    https://www.sccm.org/getattachment/owb68q37-2h1x-7e8x-5s0c-7237o1019j7p/Critical-Care-Pain-Observation-Tool-(CPOT)    PAIN AD Score: 0  [email or web address].[place].[email or web address]/painad.pdf (press ctrl +  left click to view)    Dyspnea:                           [ ]None[ ]Mild [ ]Moderate [ ]Severe     Respiratory Distress Observation Scale (RDOS): 0  A score of 0 to 2 signifies little or no respiratory distress, 3 signifies mild distress, scores 4 to 6 indicate moderate distress, and scores greater than 7 signify severe distress  [email or web address]/[email or web address]-dqhpqdynqnv-jloaehqw-raskpjojwkz-uiiva.[email or web address]    Anxiety:                             [ ]None[ ]Mild [ ]Moderate [ ]Severe   Fatigue:                             [ ]None[ ]Mild [ ]Moderate [ ]Severe   Nausea:                             [ ]None[ ]Mild [ ]Moderate [ ]Severe   Loss of appetite:              [ ]None[ ]Mild [ ]Moderate [ ]Severe   Constipation:                    [ ]None[ ]Mild [ ]Moderate [ ]Severe    Other Symptoms:  [ ]All other review of systems negative     Palliative Performance Status Version 2:      60   %    [email or web address]/[email or web address]_performance_scale_[email or web address].pdf    PHYSICAL EXAM:  ICU Vital Signs Last 24 Hrs  T(C): 36.7 (13 Mar 2025 22:52), Max: 36.7 (13 Mar 2025 22:52)  T(F): 98.1 (13 Mar 2025 22:52), Max: 98.1 (13 Mar 2025 22:52)  HR: 98 (13 Mar 2025 22:52) (68 - 98)  BP: 113/70 (13 Mar 2025 22:52) (113/70 - 152/62)  RR: 18 (13 Mar 2025 22:52) (18 - 18)  SpO2: 98% (13 Mar 2025 22:52) (95% - 98%)  O2 Parameters below as of 13 Mar 2025 22:52  Patient On (Oxygen Delivery Method): room air    GENERAL:  [ X ] No acute distress [ X]Lethargic  [ ]Unarousable  [ ]Verbal  [ ]Non-Verbal [ ]Cachexia    BEHAVIORAL/PSYCH:  [X ]Alert and Oriented x 2 [ ] Anxiety [ ] Delirium [ ] Agitation [ X] Calm   EYES: [ X] No scleral icterus [ ] Scleral icterus [ ] Closed  ENMT:  [ ]Dry mouth  [ X]No external oral lesions [ X] No external ear or nose lesions  CARDIOVASCULAR:  [ ]Regular [ ]Irregular [ ]Tachy [ ]Not Tachy  [ ]Geovanny [ ] Edema [ X] No edema  PULMONARY:  [ ]Tachypnea  [ ]Audible excessive secretions [X ] No labored breathing [ ] labored breathing  GASTROINTESTINAL: [ ]Soft  [ ]Distended  [ X ]Not distended [ ]Non tender [ ]Tender  MUSCULOSKELETAL: [ ]No clubbing [ ] clubbing  [ X] No cyanosis [ ] cyanosis  NEUROLOGIC: [ ]No focal deficits  [X ]Follows commands  [ ]Does not follow commands  [X ]Cognitive impairment  [ ]Dysphagia  [ ]Dysarthria  [ ]Paresis   SKIN: [ ] Jaundiced [ X ] Non-jaundiced [ ]Rash [ ]No Rash [ ] Warm [ ] Dry  MISC/LINES: [ ] ET tube [ ] Trach [ ]NGT/OGT [ ]PEG [ ]Good      LABS: reviewed by me                        12.2   20.44 )-----------( 215      ( 13 Mar 2025 06:51 )             36.8   03-12    136  |  99  |  26[H]  ----------------------------<  157[H]  3.8   |  28  |  0.8    Ca    8.7      12 Mar 2025 07:37  Phos  3.1     03-12  Mg     2.1     03-12    TPro  5.9[L]  /  Alb  3.5  /  TBili  0.8  /  DBili  x   /  AST  10  /  ALT  7   /  AlkPhos  66  03-12  PT/INR - ( 12 Mar 2025 07:37 )   PT: 14.50 sec;   INR: 1.22 ratio    Urinalysis Basic - ( 12 Mar 2025 07:37 )  Color: x / Appearance: x / SG: x / pH: x  Gluc: 157 mg/dL / Ketone: x  / Bili: x / Urobili: x   Blood: x / Protein: x / Nitrite: x   Leuk Esterase: x / RBC: x / WBC x   Sq Epi: x / Non Sq Epi: x / Bacteria: x      RADIOLOGY & ADDITIONAL STUDIES: reviewed by me    < from: Xray Chest 1 View- PORTABLE-Routine (Xray Chest 1 View- PORTABLE-Routine .) (03.13.25 @ 08:03) >    IMPRESSION:    Unchanged cardiomegaly and pulmonary congestion.    --- End of Report ---    < end of copied text >    EKG: reviewed by me    < from: 12 Lead ECG (03.11.25 @ 20:58) >    Ventricular Rate 70 BPM    Atrial Rate 70 BPM    P-R Interval 192 ms    QRS Duration 70 ms    Q-T Interval 398 ms    QTC Calculation(Bazett) 429 ms    P Axis 76 degrees    R Axis 42 degrees    T Axis 37 degrees    Diagnosis Line Normal sinus rhythm  Nonspecific T wave abnormality  Abnormal ECG    Confirmed by Douglas Miles ([id number]) on 3/12/2025 7:41:57 AM    < end of copied text >    Patient discussed with primary medical team MD  Palliative care education provided to patient and/or family  
  CAT JACOB  89y  Female      Patient is a 89y old  Female who presents with a chief complaint of SOB (14 Mar 2025 10:31)      INTERVAL HPI/OVERNIGHT EVENTS:  Patient seen and examined earlier this morning  awake but confused  moves all extremities  spoke to daughter and granddaughter today - patient will be discharged early Monday morning for an intake yordan on Monday at 10am with hospice at home     T(C): 36.7 (03-13-25 @ 22:52), Max: 36.7 (03-13-25 @ 22:52)  HR: 98 (03-13-25 @ 22:52) (68 - 98)  BP: 113/70 (03-13-25 @ 22:52) (113/70 - 152/62)  RR: 18 (03-13-25 @ 22:52) (18 - 18)  SpO2: 98% (03-13-25 @ 22:52) (95% - 98%)    PHYSICAL EXAM:  GENERAL: NAD, well-groomed,   HEAD:  Atraumatic, Normocephalic  EYES: conjunctiva and sclera clear  ENMT: Moist mucous membranes,  No visible lesions  NECK: Supple, No JVD, Normal thyroid  NERVOUS SYSTEM:  Alert & Oriented X3, Good concentration; moves all extremities   CHEST/LUNG: good air entry   HEART: Regular rate and rhythm; No murmurs, rubs, or gallops  ABDOMEN: Soft, Nontender, Nondistended; Bowel sounds present  EXTREMITIES:  2+ Peripheral Pulses, No clubbing, cyanosis, or edema  LYMPH: No lymphadenopathy noted  SKIN: No rashes or lesions    Consultant(s) Notes Reviewed:  [x ] YES  [ ] NO  Care Discussed with Consultants/Other Providers [ x] YES  [ ] NO    LAB:                        12.2   20.44 )-----------( 215      ( 13 Mar 2025 06:51 )             36.8         Drug Dosing Weight  Height (cm): 170.2 (12 Mar 2025 00:40)  Weight (kg): 64.4 (12 Mar 2025 00:40)  BMI (kg/m2): 22.2 (12 Mar 2025 00:40)  BSA (m2): 1.75 (12 Mar 2025 00:40)      I&O's Summary    13 Mar 2025 07:01  -  14 Mar 2025 07:00  --------------------------------------------------------  IN: 0 mL / OUT: 500 mL / NET: -500 mL          RADIOLOGY & ADDITIONAL TESTS:  Imaging Personally Reviewed:  [x] YES  [ ] NO    HEALTH ISSUES - PROBLEM Dx:  Acute respiratory failure with hypoxia    Mediastinal mass    Dementia    Encounter for palliative care    Comfort measures only status        MEDS:  acetaminophen     Tablet .. 650 milliGRAM(s) Oral every 6 hours PRN  albuterol/ipratropium for Nebulization 3 milliLiter(s) Nebulizer every 6 hours  artificial  tears Solution 1 Drop(s) Both EYES daily  chlorhexidine 2% Cloths 1 Application(s) Topical <User Schedule>  donepezil 10 milliGRAM(s) Oral at bedtime  fluticasone propionate/ salmeterol 250-50 MICROgram(s) Diskus 1 Dose(s) Inhalation two times a day  furosemide   Injectable 40 milliGRAM(s) IV Push daily  haloperidol    Concentrate 0.5 milliGRAM(s) Oral every 4 hours PRN  LORazepam    Concentrate 0.5 milliGRAM(s) SubLingual every 4 hours PRN  morphine Concentrate 5 milliGRAM(s) SubLingual every 3 hours PRN  tiotropium 2.5 MICROgram(s) Inhaler 2 Puff(s) Inhalation daily  traZODone 150 milliGRAM(s) Oral at bedtime  
SUBJECTIVE/OVERNIGHT EVENTS  Today is hospital day 1d. This morning patient was seen and examined at bedside, resting comfortably in bed. No acute or major events overnight. patient in for COPD excaberation and has been having oxygen requirements - expiratory wheeze heard diffusleg. Patient is an ex smoker quit 3 years ago. f/u S ans for dietary recs iso of questionable aspiration. c/w azithromycin for given recent discharge and COPD exacebration.     HOSPITAL COURSE  89-year-old female with past medical history of hypertension, hyperlipidemia, CHF, pulmonary hypertension, aortic stenosis, possible COPD was discharged yesterday 3/10 on 2L home oxygen, history of DVT not on anticoagulation, newly diagnosed esophageal mass presented for evaluation shortness of breath, desatting to 80s on 4L NC. Denies fever, cough sore throat, chills, nausea, vomiting, shortness of breath, abdominal pain, urinary complaints.   Patient was found to have Mediastinal Mass, Subcarinal/Esophgeal Mass Suspicious for Neoplasia and severe pulmonary hypertension on CTA Chest on recent admission, daughter refused EGD/EUS and further work up, per daughter patient would not want to pursue chemo even if found to have cancer. Family planing to opt for hospice. Patient was discharged on 3/10/2025 with home oxygen. Patient is DNR/DNI.     Granddaughter at bedside states that she also noticed increased secretions and some gurgling sound.    VITAL SIGNS:  BP: 110/65  Heart Rate: 68 /min  Respiration Rate: 18 /min  SpO2 (%): 98 % on mask, nonrebreather  Oxygen Therapy Flow (L/min)	10 L/min    LABS:   WBC: 21.62  Hb: 11.7  T. bili: 1.3    RVP negative    VBG: pH: 7.38  pCO2: 50  pO2: 46  HCO3: 30      CXR: looks similar to previous, no new changes    s/p duonebs and solumedrol 80mg IVP in ED.       MEDICATIONS  STANDING MEDICATIONS  albuterol/ipratropium for Nebulization 3 milliLiter(s) Nebulizer every 6 hours  amLODIPine   Tablet 5 milliGRAM(s) Oral at bedtime  artificial  tears Solution 1 Drop(s) Both EYES daily  atorvastatin 40 milliGRAM(s) Oral at bedtime  azithromycin   Tablet 500 milliGRAM(s) Oral every 24 hours  chlorhexidine 2% Cloths 1 Application(s) Topical <User Schedule>  donepezil 10 milliGRAM(s) Oral at bedtime  enoxaparin Injectable 40 milliGRAM(s) SubCutaneous every 24 hours  fluticasone propionate/ salmeterol 250-50 MICROgram(s) Diskus 1 Dose(s) Inhalation two times a day  methylPREDNISolone sodium succinate Injectable 40 milliGRAM(s) IV Push two times a day  metoprolol tartrate 25 milliGRAM(s) Oral two times a day  pantoprazole    Tablet 40 milliGRAM(s) Oral before breakfast  polyethylene glycol 3350 17 Gram(s) Oral two times a day  senna 2 Tablet(s) Oral at bedtime  tiotropium 2.5 MICROgram(s) Inhaler 2 Puff(s) Inhalation daily  traZODone 75 milliGRAM(s) Oral at bedtime    PRN MEDICATIONS    VITALS  T(F): 97.1 (03-12-25 @ 07:25), Max: 99.8 (03-11-25 @ 16:11)  HR: 75 (03-12-25 @ 07:25) (68 - 78)  BP: 98/49 (03-12-25 @ 07:25) (98/49 - 140/53)  RR: 18 (03-12-25 @ 07:25) (18 - 18)  SpO2: 97% (03-12-25 @ 07:25) (94% - 98%)    PHYSICAL EXAM  GENERAL: NAD, well-groomed  EYES: EOMI, PERRLA, conjunctiva and sclera clear  HEART: Regular rate and rhythm; No murmurs, rubs, or gallops  RESPIRATORY: CTA B/L, bilateral expiratory wheezes, congested   ABDOMEN: Soft, Nontender, Nondistended; Bowel sounds present  NEUROLOGY: A&Ox2, nonfocal, moving all extremities  EXTREMITIES:  2+ Peripheral Pulses, No clubbing, cyanosis, or edema  SKIN: warm, dry, normal color, no rash or abnormal lesions      LABS             11.1   17.35 )-----------( 179      ( 03-12-25 @ 07:37 )             33.1     136  |  99  |  26  -------------------------<  157   03-12-25 @ 07:37  3.8  |  28  |  0.8    Ca      8.7     03-12-25 @ 07:37  Phos   3.1     03-12-25 @ 07:37  Mg     2.1     03-12-25 @ 07:37    TPro  5.9  /  Alb  3.5  /  TBili  0.8  /  DBili  x   /  AST  10  /  ALT  7   /  AlkPhos  66  /  GGT  x     03-12-25 @ 07:37    PT/INR - ( 03-12-25 @ 07:37 )   PT: 14.50 sec[H];   INR: 1.22 ratio      Urinalysis Basic - ( 12 Mar 2025 07:37 )    Color: x / Appearance: x / SG: x / pH: x  Gluc: 157 mg/dL / Ketone: x  / Bili: x / Urobili: x   Blood: x / Protein: x / Nitrite: x   Leuk Esterase: x / RBC: x / WBC x   Sq Epi: x / Non Sq Epi: x / Bacteria: x          IMAGING    LE DUPLEX  IMPRESSION:  No evidence of deep venous thrombosis in either lower extremity.    CXR - 3/7     IMPRESSION:    Bilateral opacities/effusions.    US abdomen  FINDINGS/  IMPRESSION:    No ascites identified.      
SUBJECTIVE/OVERNIGHT EVENTS  Today is hospital day 3d. This morning patient was seen and examined at bedside, resting comfortably in bed. No acute or major events overnight. Patient confused is AAOx2.  Breath sounds expiratory wheeze but decreased b/l basilar breath sounds - suspected fluid overload. Started the patient of lasix.  patient is an ex smoker quit 3 years ago. f/u S ans for dietary recs iso of questionable aspiration. c/w azithromycin for given recent discharge and COPD exacerbation     HOSPITAL COURSE  89-year-old female with past medical history of hypertension, hyperlipidemia, CHF, pulmonary hypertension, aortic stenosis, possible COPD was discharged yesterday 3/10 on 2L home oxygen, history of DVT not on anticoagulation, newly diagnosed esophageal mass presented for evaluation shortness of breath, desatting to 80s on 4L NC. Denies fever, cough sore throat, chills, nausea, vomiting, shortness of breath, abdominal pain, urinary complaints.   Patient was found to have Mediastinal Mass, Subcarinal/Esophgeal Mass Suspicious for Neoplasia and severe pulmonary hypertension on CTA Chest on recent admission, daughter refused EGD/EUS and further work up, per daughter patient would not want to pursue chemo even if found to have cancer. Family planing to opt for hospice. Patient was discharged on 3/10/2025 with home oxygen. Patient is DNR/DNI.     Granddaughter at bedside states that she also noticed increased secretions and some gurgling sound.    VITAL SIGNS:  BP: 110/65  Heart Rate: 68 /min  Respiration Rate: 18 /min  SpO2 (%): 98 % on mask, nonrebreather  Oxygen Therapy Flow (L/min)	10 L/min    LABS:   WBC: 21.62  Hb: 11.7  T. bili: 1.3    RVP negative    VBG: pH: 7.38  pCO2: 50  pO2: 46  HCO3: 30      CXR: looks similar to previous, no new changes    s/p duonebs and solumedrol 80mg IVP in ED.       MEDICATIONS  MEDICATIONS  (STANDING):  albuterol/ipratropium for Nebulization 3 milliLiter(s) Nebulizer every 6 hours  amLODIPine   Tablet 5 milliGRAM(s) Oral at bedtime  artificial  tears Solution 1 Drop(s) Both EYES daily  atorvastatin 40 milliGRAM(s) Oral at bedtime  azithromycin   Tablet 500 milliGRAM(s) Oral every 24 hours  chlorhexidine 2% Cloths 1 Application(s) Topical <User Schedule>  donepezil 10 milliGRAM(s) Oral at bedtime  enoxaparin Injectable 40 milliGRAM(s) SubCutaneous every 24 hours  fluticasone propionate/ salmeterol 250-50 MICROgram(s) Diskus 1 Dose(s) Inhalation two times a day  furosemide   Injectable 40 milliGRAM(s) IV Push daily  metoprolol tartrate 25 milliGRAM(s) Oral two times a day  pantoprazole    Tablet 40 milliGRAM(s) Oral before breakfast  tiotropium 2.5 MICROgram(s) Inhaler 2 Puff(s) Inhalation daily  traZODone 75 milliGRAM(s) Oral at bedtime    MEDICATIONS  (PRN):  acetaminophen     Tablet .. 650 milliGRAM(s) Oral every 6 hours PRN Mild Pain (1 - 3)    VITALS  Vital Signs Last 24 Hrs  T(C): 36.3 (13 Mar 2025 07:20), Max: 36.8 (13 Mar 2025 01:01)  T(F): 97.3 (13 Mar 2025 07:20), Max: 98.3 (13 Mar 2025 01:01)  HR: 74 (13 Mar 2025 07:20) (71 - 80)  BP: 164/56 (13 Mar 2025 07:20) (135/56 - 164/56)  BP(mean): --  RR: 18 (13 Mar 2025 07:20) (18 - 18)  SpO2: 96% (13 Mar 2025 07:20) (96% - 97%)    Parameters below as of 13 Mar 2025 07:20  Patient On (Oxygen Delivery Method): nasal cannula  O2 Flow (L/min): 3      PHYSICAL EXAM  GENERAL: NAD, well-groomed  EYES: EOMI, PERRLA, conjunctiva and sclera clear  HEART: Regular rate and rhythm; No murmurs, rubs, or gallops  RESPIRATORY: CTA B/L, bilateral expiratory wheezes, congested   ABDOMEN: Soft, Nontender, Nondistended; Bowel sounds present  NEUROLOGY: A&Ox2, nonfocal, moving all extremities  EXTREMITIES:  2+ Peripheral Pulses, No clubbing, cyanosis, or edema  SKIN: warm, dry, normal color, no rash or abnormal lesions      LABS             11.1   17.35 )-----------( 179      ( 03-12-25 @ 07:37 )             33.1     136  |  99  |  26  -------------------------<  157   03-12-25 @ 07:37  3.8  |  28  |  0.8    Ca      8.7     03-12-25 @ 07:37  Phos   3.1     03-12-25 @ 07:37  Mg     2.1     03-12-25 @ 07:37    TPro  5.9  /  Alb  3.5  /  TBili  0.8  /  DBili  x   /  AST  10  /  ALT  7   /  AlkPhos  66  /  GGT  x     03-12-25 @ 07:37    PT/INR - ( 03-12-25 @ 07:37 )   PT: 14.50 sec[H];   INR: 1.22 ratio      Urinalysis Basic - ( 12 Mar 2025 07:37 )    Color: x / Appearance: x / SG: x / pH: x  Gluc: 157 mg/dL / Ketone: x  / Bili: x / Urobili: x   Blood: x / Protein: x / Nitrite: x   Leuk Esterase: x / RBC: x / WBC x   Sq Epi: x / Non Sq Epi: x / Bacteria: x          IMAGING    LE DUPLEX  IMPRESSION:  No evidence of deep venous thrombosis in either lower extremity.    CXR - 3/13    IMPRESSION:    Mild CHF.      US abdomen  FINDINGS/  IMPRESSION:    No ascites identified.      
SUBJECTIVE/OVERNIGHT EVENTS  Today is hospital day 5d. This morning patient was seen and examined at bedside, resting comfortably in bed. No acute or major events overnight. Patient has no complaints at this time.     CODE STATUS: DNR/DNI/CMO    MEDICATIONS  STANDING MEDICATIONS  albuterol/ipratropium for Nebulization 3 milliLiter(s) Nebulizer every 6 hours  artificial  tears Solution 1 Drop(s) Both EYES daily  chlorhexidine 2% Cloths 1 Application(s) Topical <User Schedule>  donepezil 10 milliGRAM(s) Oral at bedtime  fluticasone propionate/ salmeterol 250-50 MICROgram(s) Diskus 1 Dose(s) Inhalation two times a day  tiotropium 2.5 MICROgram(s) Inhaler 2 Puff(s) Inhalation daily  traZODone 150 milliGRAM(s) Oral at bedtime    PRN MEDICATIONS  acetaminophen     Tablet .. 650 milliGRAM(s) Oral every 6 hours PRN  haloperidol    Concentrate 0.5 milliGRAM(s) Oral every 4 hours PRN  LORazepam    Concentrate 0.5 milliGRAM(s) SubLingual every 4 hours PRN  morphine Concentrate 5 milliGRAM(s) SubLingual every 3 hours PRN    PHYSICAL EXAM  GENERAL  ( X ) NAD, lying in bed comfortably     (  ) obtunded     (  ) lethargic     (  ) somnolent    HEAD  ( X ) Atraumatic     (  ) hematoma     (  ) laceration (specify location:       )     NECK  ( X ) Supple     (  ) neck stiffness     (  ) nuchal rigidity     (  )  no JVD     (  ) JVD present ( -- cm)    HEART  Rate -->  ( X ) normal rate    (  ) bradycardic    (  ) tachycardic  Rhythm -->  ( X ) regular    (  ) regularly irregular    (  ) irregularly irregular  Murmurs -->  ( X ) normal s1/s2    (  ) systolic murmur    (  ) diastolic murmur    (  ) continuous murmur     (  ) S3 present    (  ) S4 present    LUNGS  ( X )Unlabored respirations     (  ) tachypnea  ( X ) B/L air entry     (  ) decreased breath sounds in:  (location     )    (  ) no adventitious sound     (  ) crackles     (  ) wheezing      (  ) rhonchi      (specify location:       )  (  ) chest wall tenderness (specify location:       )    ABDOMEN  ( X ) Soft     (  ) tense   |   ( X ) nondistended     (  ) distended   |   ( X ) +BS     (  ) hypoactive bowel sounds     (  ) hyperactive bowel sounds  ( X ) nontender     (  ) RUQ tenderness     (  ) RLQ tenderness     (  ) LLQ tenderness     (  ) epigastric tenderness     (  ) diffuse tenderness  (  ) Splenomegaly      (  ) Hepatomegaly      (  ) Jaundice     (  ) ecchymosis     EXTREMITIES  ( X ) Normal     (  ) Rash     (  ) ecchymosis     (  ) varicose veins      (  ) pitting edema     (  ) non-pitting edema   (  ) ulceration     (  ) gangrene:     (location:     )    SKIN  ( X ) No rashes or lesions     (  ) maculopapular rash     (  ) pustules     (  ) vesicles     (  ) ulcer     (  ) ecchymosis     (specify location:     )    
HPI:    89-year-old female with past medical history of hypertension, hyperlipidemia, CHF, pulmonary hypertension, aortic stenosis, possible COPD was discharged yesterday 3/10 on 2L home oxygen, history of DVT not on anticoagulation, newly diagnosed esophageal mass presented for evaluation shortness of breath, desatting to 80s on 4L NC. Denies fever, cough sore throat, chills, nausea, vomiting, shortness of breath, abdominal pain, urinary complaints.   Patient was found to have Mediastinal Mass, Subcarinal/Esophgeal Mass Suspicious for Neoplasia and severe pulmonary hypertension on CTA Chest on recent admission, daughter refused EGD/EUS and further work up, per daughter patient would not want to pursue chemo even if found to have cancer. Family planing to opt for hospice. Patient was discharged on 3/10/2025 with home oxygen. Patient is DNR/DNI.     Granddaughter at bedside states that she also noticed increased secretions and some gurgling sound.    VITAL SIGNS:  BP: 110/65  Heart Rate: 68 /min  Respiration Rate: 18 /min  SpO2 (%): 98 % on mask, nonrebreather  Oxygen Therapy Flow (L/min)	10 L/min    LABS:   WBC: 21.62  Hb: 11.7  T. bili: 1.3    RVP negative    VBG: pH: 7.38  pCO2: 50  pO2: 46  HCO3: 30      CXR: looks similar to previous, no new changes    s/p duonebs and solumedrol 80mg IVP in ED.  (11 Mar 2025 23:58)      Interval history:     - patient seen at bedside, reports no discomfort. per family, she had a rough night and was restless, sundowning, and calling her family on her cell phone.      ADVANCE DIRECTIVES:     [ ] Full Code [ X] DNR/DNI  MOLST  [ ]  Living Will  [ ]   DECISION MAKER(s): Matilda (daughter) is HCP  [ X ] Health Care Proxy(s)  [ ] Surrogate(s)  [ ] Guardian           Name(s): Phone Number(s):      BASELINE (I)ADL(s) (prior to admission):    [place]: [ ]Total  [X ] Moderate [ ]Dependent  Palliative Performance Status Version 2:         %    http://npcrc.org/files/news/palliative_performance_scale_[email or web address].pdf    Allergies    No Known Allergies    Intolerances    MEDICATIONS  (STANDING):  albuterol/ipratropium for Nebulization 3 milliLiter(s) Nebulizer every 6 hours  amLODIPine   Tablet 5 milliGRAM(s) Oral at bedtime  artificial  tears Solution 1 Drop(s) Both EYES daily  atorvastatin 40 milliGRAM(s) Oral at bedtime  azithromycin   Tablet 500 milliGRAM(s) Oral every 24 hours  chlorhexidine 2% Cloths 1 Application(s) Topical <User Schedule>  donepezil 10 milliGRAM(s) Oral at bedtime  fluticasone propionate/ salmeterol 250-50 MICROgram(s) Diskus 1 Dose(s) Inhalation two times a day  furosemide   Injectable 40 milliGRAM(s) IV Push daily  metoprolol tartrate 25 milliGRAM(s) Oral two times a day  pantoprazole    Tablet 40 milliGRAM(s) Oral before breakfast  tiotropium 2.5 MICROgram(s) Inhaler 2 Puff(s) Inhalation daily  traZODone 150 milliGRAM(s) Oral at bedtime    MEDICATIONS  (PRN):  acetaminophen     Tablet .. 650 milliGRAM(s) Oral every 6 hours PRN Mild Pain (1 - 3)  haloperidol    Injectable 0.5 milliGRAM(s) IV Push every 6 hours PRN agitation  LORazepam   Injectable 0.5 milliGRAM(s) IV Push every 4 hours PRN Agitation  morphine Concentrate 5 milliGRAM(s) SubLingual every 3 hours PRN dyspnea or pain    PRESENT SYMPTOMS: [ ]Unable to obtain due to poor mentation   Source if other than patient:  [ ]Family   [ ]Team     Pain: [ ]yes [ X]no  QOL impact -   Location -                    Aggravating factors -  Quality -  Radiation -  Timing-  Severity (0-10 scale):  Minimal acceptable level (0-10 scale):     CPOT:    https://www.sccm.org/getattachment/bet78h07-8m8r-0y3h-9v0l-7187k8000v0v/Critical-Care-Pain-Observation-Tool-(CPOT)    PAIN AD Score:   [email or web address]/[email or web address] (press ctrl +  left click to view)    Dyspnea:                           [ X]None[ ]Mild [ ]Moderate [ ]Severe     Respiratory Distress Observation Scale (RDOS):   A score of 0 to 2 signifies little or no respiratory distress, 3 signifies mild distress, scores 4 to 6 indicate moderate distress, and scores greater than 7 signify severe distress  [email or web address]-egkkxzyolap-rpuxpvlb-[email or web address]    Anxiety:                             [ ]None[ ]Mild [ ]Moderate [ ]Severe   Fatigue:                             [ ]None[ ]Mild [ ]Moderate [ ]Severe   Nausea:                             [ ]None[ ]Mild [ ]Moderate [ ]Severe   Loss of appetite:              [ ]None[ ]Mild [ ]Moderate [ ]Severe   Constipation:                    [ ]None[ ]Mild [ ]Moderate [ ]Severe    Other Symptoms:  [X ]All other review of systems negative     Palliative Performance Status Version 2:      60   %    [email or web address]/[email or web address]_performance_scale_[email or web address]    PHYSICAL EXAM:  Vital Signs Last 24 Hrs  T(C): 36.3 (13 Mar 2025 07:20), Max: 36.8 (13 Mar 2025 01:01)  T(F): 97.3 (13 Mar 2025 07:20), Max: 98.3 (13 Mar 2025 01:01)  HR: 74 (13 Mar 2025 07:20) (71 - 80)  BP: 164/56 (13 Mar 2025 07:20) (135/56 - 164/56)  RR: 18 (13 Mar 2025 07:20) (18 - 18)  SpO2: 96% (13 Mar 2025 07:20) (96% - 97%)    GENERAL:  [ X ] No acute distress [ ]Lethargic  [ ]Unarousable  [ X]Verbal  [ ]Non-Verbal [ ]Cachexia    BEHAVIORAL/PSYCH:  [X ]Alert and Oriented x 2 [ ] Anxiety [ ] Delirium [ ] Agitation [ X] Calm   EYES: [ X] No scleral icterus [ ] Scleral icterus [ ] Closed  ENMT:  [ ]Dry mouth  [ X]No external oral lesions [ X] No external ear or nose lesions  CARDIOVASCULAR:  [ ]Regular [ ]Irregular [ ]Tachy [ ]Not Tachy  [ ]Geovanny [ ] Edema [ X] No edema  PULMONARY:  [ ]Tachypnea  [ ]Audible excessive secretions [X ] No labored breathing [ ] labored breathing  GASTROINTESTINAL: [ ]Soft  [ ]Distended  [ X ]Not distended [ ]Non tender [ ]Tender  MUSCULOSKELETAL: [ ]No clubbing [ ] clubbing  [ X] No cyanosis [ ] cyanosis  NEUROLOGIC: [ ]No focal deficits  [X ]Follows commands  [ ]Does not follow commands  [X ]Cognitive impairment  [ ]Dysphagia  [ ]Dysarthria  [ ]Paresis   SKIN: [ ] Jaundiced [ X ] Non-jaundiced [ ]Rash [ ]No Rash [ ] Warm [ ] Dry  MISC/LINES: [ ] ET tube [ ] Trach [ ]NGT/OGT [ ]PEG [ ]Good    LABS: reviewed by me                        12.2   20.44 )-----------( 215      ( 13 Mar 2025 06:51 )             36.8   03-12    136  |  99  |  26[H]  ----------------------------<  157[H]  3.8   |  28  |  0.8    Ca    8.7      12 Mar 2025 07:37  Phos  3.1     03-12  Mg     2.1     03-12    TPro  5.9[L]  /  Alb  3.5  /  TBili  0.8  /  DBili  x   /  AST  10  /  ALT  7   /  AlkPhos  66  03-12  PT/INR - ( 12 Mar 2025 07:37 )   PT: 14.50 sec;   INR: 1.22 ratio    Urinalysis Basic - ( 12 Mar 2025 07:37 )  Color: x / Appearance: x / SG: x / pH: x  Gluc: 157 mg/dL / Ketone: x  / Bili: x / Urobili: x   Blood: x / Protein: x / Nitrite: x   Leuk Esterase: x / RBC: x / WBC x   Sq Epi: x / Non Sq Epi: x / Bacteria: x      RADIOLOGY & ADDITIONAL STUDIES: reviewed by me    < from: Xray Chest 1 View- PORTABLE-Routine (Xray Chest 1 View- PORTABLE-Routine .) (03.13.25 @ 08:03) >    IMPRESSION:    Unchanged cardiomegaly and pulmonary congestion.    --- End of Report ---    < end of copied text >    EKG: reviewed by me    < from: 12 Lead ECG (03.11.25 @ 20:58) >    Ventricular Rate 70 BPM    Atrial Rate 70 BPM    P-R Interval 192 ms    QRS Duration 70 ms    Q-T Interval 398 ms    QTC Calculation(Bazett) 429 ms    P Axis 76 degrees    R Axis 42 degrees    T Axis 37 degrees    Diagnosis Line Normal sinus rhythm  Nonspecific T wave abnormality  Abnormal ECG    Confirmed by Douglas Miles ([id number]) on 3/12/2025 7:41:57 AM    < end of copied text >    Patient discussed with primary medical team MD  Palliative care education provided to patient and/or family

## 2025-03-17 NOTE — PROGRESS NOTE ADULT - ASSESSMENT
89-year-old female with past medical history of hypertension, hyperlipidemia, CHF, pulmonary hypertension, aortic stenosis, possible COPD was discharged yesterday 3/10 on 2L home oxygen, history of DVT not on anticoagulation, newly diagnosed esophageal mass presented for evaluation shortness of breath, desatting to 80s on 4L NC. Denies fever, cough sore throat, chills, nausea, vomiting, shortness of breath, abdominal pain, urinary complaints.     #Chronic hypoxic respiratory failure - stable   #Suspected COPD - do not believe to be in exacerbation   #suspected fluid overload  #HFpEF  #Severe Pulmonary Hypertension- likely due to mass compressing on pulmonary artery   - started lasix 40mg IV daily - continue today  - continue nebs  - continue advair  - continue protonix  - s/s eval appreciated - regular/thins  - TTE 3/8/25:  EF 66%, severe Pulm HTN, mod AS   - ECG: Sinus bradycardia with 1st degree A-V block Rightward axis   - CXR - repeated 3/12- consistent with fluid overload   - patient is a former smoker - smoked for 60 years  - continue oxygen support at 2L NC    #Newly diagnosed mediastinal mass    - As family/daughter does not wish further work up for the mediastinal mass, hospice referral was made today  - Palliative evaluated patient last admission  - I completed GOC discussion with pt's daughter (HCP) on 3/12- DNR/DNI  - Hospice consulted - patient will be admitted to home Hospice on Monday at 10am and will be discharged at 8am on Monday  - family in agreement with plan of care  - family asked for 1:1 sit    #Hx HTN and HLD   - c/w home meds     #Dementia  - c/w donepezil  - frequent reorientation   - patient has short term memory forgetfulness. wanders at night  - Has 24hrs HHA at home    #GOC - DNR/DNI/CMO    Progress Note Handoff  Pending Consults: Hospice  Pending Tests: none  Pending Results: none  Family Discussion: Discussed labs, meds, oxygen, hospice cs and overall plan of care with pt, her daughter and granddaughter and medical staff. PFD for 72hrs home with hospice   Disposition: Home_x____/SNF______/Other_____/Unknown at this time_____  Spent 55 min reviewing chart, speaking with patient/family and on coordinating patient care during interdisciplinary rounds . 
89-year-old female with past medical history of hypertension, hyperlipidemia, CHF, pulmonary hypertension, aortic stenosis, possible COPD was discharged yesterday 3/10 on 2L home oxygen, history of DVT not on anticoagulation, newly diagnosed esophageal mass presented for evaluation shortness of breath, desatting to 80s on 4L NC. Denies fever, cough sore throat, chills, nausea, vomiting, shortness of breath, abdominal pain, urinary complaints.     #Acute hypoxic respiratory failure  #COPD exacebration  #Suspected aspiration pneumonia  - leukocytosis: 19.4--> 21--> 17.35 likley 2/2 steroids   - s/p duonebs rocephin and solumedrol in ED  - c/w duonebs q6 and solumedrol IV 40 BID   - Started on azithro 500mg q24 foir 5 days   - Aspiration precautions, frequent suctioning of oral secretions  - keep NPO except meds for now, pending S/S eval      #HFpEF  #Severe Pulmonary Hypertension- likely due to mass compressing on pulmonary artery   - TTE 3/8/25:  EF 66%, severe Pulm Htn, mod AS   - ECG: Sinus bradycardia with 1st degree A-V block Rightward axis   - CXR - repeat and reassess   - Hold off diuretics for now as no signs of volume overload     #Mediastinal mass    - ishan causing pHTN  - As family/daughter does not wish further work up for the mediastinal mass, hospice referral was made 3/10 . Palliative was also on the case  - newly diagnosed Mediastinal Mass : Subcarinal/Esophgeal Mass Suspicious for Neoplasia on CTA Chest - during previous admission     #Constipation    - c/w senna/miralax    #Hx HTN  and  HLD   - c/w home meds   - metoprolol, amlo and lipitor    #Dementia  - c/w donepezil  - patient has short term memory forgetfulness. wanders at night  - Has aides at home      #MISC  - DVT PPx: Lovenox q24  - GI PPx: PROTONIX  - Diet: NPO except meds for now, pending S/S eval  - Activity: AAT  - Code:  DNR/DNI    #Handoff  - c/w abx and steroids for now  - Repeat CXR  - acute for now   
89-year-old female with past medical history of hypertension, hyperlipidemia, CHF, pulmonary hypertension, aortic stenosis, possible COPD was discharged yesterday 3/10 on 2L home oxygen, history of DVT not on anticoagulation, newly diagnosed esophageal mass presented for evaluation shortness of breath, desatting to 80s on 4L NC. Denies fever, cough sore throat, chills, nausea, vomiting, shortness of breath, abdominal pain, urinary complaints.     #Acute hypoxic respiratory failure 2/2 fluid overload vs COPD ex  #Mild HFpEF  - s/p duonebs rocephin and solumedrol in ED  - c/w duonebs q6  - s/p solumedrol IV 40 BID >> Transitioned to PO steroids for now   - c/w azithro 500mg q24 for 5 days end date 3/16    - Aspiration precautions, frequent suctioning of oral secretions  - Speech and swallow - Regular and thins    #HFpEF  #Severe Pulmonary Hypertension- likely due to mass compressing on pulmonary artery   - TTE 3/8/25:  EF 66%, severe Pulm Htn, mod AS   - ECG: Sinus bradycardia with 1st degree A-V block Rightward axis   - CXR - repeat and reassess   - started IV lasix 40 qd    #Mediastinal mass    - likely causing pHTN  - As family/daughter does not wish further work up for the mediastinal mass, hospice referral was made 3/10 . Palliative was also on the case  - newly diagnosed Mediastinal Mass : Subcarinal/Esophgeal Mass Suspicious for Neoplasia on CTA Chest - during previous admission     #Constipation    - c/w senna/miralax    #Hx HTN  and  HLD   - c/w home meds   - metoprolol, amlo and lipitor    #Dementia  - c/w donepezil  - patient has short term memory forgetfulness. wanders at night  - Has aides at home      #MISC  - DVT PPx: Lovenox q24  - GI PPx: PROTONIX  - Diet: NPO except meds for now, pending S/S eval  - Activity: AAT  - Code:  DNR/DNI    #Handoff  - c/w azithro for now   - transition to prednsione oral  - c/w IV lasix  - Repeat CXR  - acute for now     
89-year-old female with past medical history of hypertension, hyperlipidemia, CHF, pulmonary hypertension, aortic stenosis, possible COPD was discharged yesterday 3/10 on 2L home oxygen, history of DVT not on anticoagulation, newly diagnosed esophageal mass presented for evaluation shortness of breath, desatting to 80s on 4L NC. Denies fever, cough sore throat, chills, nausea, vomiting, shortness of breath, abdominal pain, urinary complaints.     #CMO  - No labs, FS, artificial feeding, IV fluids, escalation of care  - continue nasal cannula for comfort  - please take vitals once daily per family request  - please continue Lasix and PO meds for now  - C/w 1:1 per family request    #Acute hypoxic respiratory failure 2/2 fluid overload vs COPD ex  #Mild HFpEF  - s/p duonebs rocephin and solumedrol in ED  - c/w duonebs q6  - s/p solumedrol IV 40 BID   - s/p azithro 500mg q24 for 5 days    - Aspiration precautions  - Speech and swallow - Regular and mildly thick liquids    #HFpEF  #Severe Pulmonary Hypertension- likely due to mass compressing on pulmonary artery   - TTE 3/8/25:  EF 66%, severe Pulm Htn, mod AS   - ECG: Sinus bradycardia with 1st degree A-V block Rightward axis   - CXR - repeat and reassess   - D/c IV lasix 40 qd    #Mediastinal mass    - likely causing pHTN  - As family/daughter does not wish further work up for the mediastinal mass, hospice referral was made 3/10 . Palliative was also on the case  - newly diagnosed Mediastinal Mass : Subcarinal/Esophgeal Mass Suspicious for Neoplasia on CTA Chest - during previous admission     #HTN  #HLD   - D/c home meds    #Dementia  - c/w donepezil  - patient has short term memory forgetfulness. wanders at night  - Has aides at home      #MISC  - DVT PPx: not needed  - GI PPx: not needed  - Diet: DASH, mildly thick liquids  - Activity: AAT    Pending: marcin THAO planning
89-year-old female with past medical history of hypertension, hyperlipidemia, CHF, pulmonary hypertension, aortic stenosis, possible COPD was discharged yesterday 3/10 on 2L home oxygen, history of DVT not on anticoagulation, newly diagnosed esophageal mass presented for evaluation shortness of breath, desatting to 80s on 4L NC. Denies fever, cough sore throat, chills, nausea, vomiting, shortness of breath, abdominal pain, urinary complaints.     #Chronic hypoxic respiratory failure - stable   #Suspected COPD - do not believe to be in exacerbation   #suspected fluid overload  #HFpEF  #Severe Pulmonary Hypertension- likely due to mass compressing on pulmonary artery   - started lasix 40mg IV daily - dc today   - continue nebs  - continue advair  - s/s eval appreciated - regular/thins  - TTE 3/8/25:  EF 66%, severe Pulm HTN, mod AS   - ECG: Sinus bradycardia with 1st degree A-V block Rightward axis   - CXR - repeated 3/12- consistent with fluid overload   - patient is a former smoker - smoked for 60 years  - continue oxygen support at 2L NC - has home 02    #Newly diagnosed mediastinal mass    - As family/daughter does not wish further work up for the mediastinal mass, hospice referral was made today  - Palliative evaluated patient last admission  - I completed GOC discussion with pt's daughter (HCP) on 3/12- DNR/DNI  - Hospice consulted - patient will be admitted to home Hospice on Monday at 10am and will be discharged at 8am on Monday - transportation arranged  - family in agreement with plan of care  - family asked for 1:1 sit  - CMO status as of 3/14    #Hx HTN and HLD   #Dementia  - c/w donepezil  - frequent reorientation   - patient has short term memory forgetfulness and wanders at night  - Has 24hrs HHA at home  - continue current tx    #GOC - DNR/DNI/CMO    Progress Note Handoff  Pending Consults: Hospice  Pending Tests: none  Pending Results: none  Family Discussion: Discussed labs, meds, oxygen, hospice cs and overall plan of care with pt, her daughter and granddaughter (on 3/14) and medical staff. PFD for 24hrs home with hospice   Disposition: Home_x____/SNF______/Other_____/Unknown at this time_____  Spent 55 min reviewing chart, speaking with patient/family and on coordinating patient care during interdisciplinary rounds . 
89-year-old female with past medical history of hypertension, hyperlipidemia, CHF, pulmonary hypertension, aortic stenosis, possible COPD was discharged yesterday 3/10 on 2L home oxygen, history of DVT not on anticoagulation, newly diagnosed esophageal mass presented for evaluation shortness of breath, desatting to 80s on 4L NC. Denies fever, cough sore throat, chills, nausea, vomiting, shortness of breath, abdominal pain, urinary complaints.     #CMO  - No labs, FS, artificial feeding, IV fluids, escalation of care  - continue nasal cannula for comfort  - please take vitals once daily per family request  - please continue Lasix and PO meds for now  - C/w 1:1 per family request    #Acute hypoxic respiratory failure 2/2 fluid overload vs COPD ex  #Mild HFpEF  - s/p duonebs rocephin and solumedrol in ED  - c/w duonebs q6  - s/p solumedrol IV 40 BID   - s/p azithro 500mg q24 for 5 days    - Aspiration precautions  - Speech and swallow - Regular and mildly thick liquids    #HFpEF  #Severe Pulmonary Hypertension- likely due to mass compressing on pulmonary artery   - TTE 3/8/25:  EF 66%, severe Pulm Htn, mod AS   - ECG: Sinus bradycardia with 1st degree A-V block Rightward axis   - CXR - repeat and reassess   - D/c IV lasix 40 qd    #Mediastinal mass    - likely causing pHTN  - As family/daughter does not wish further work up for the mediastinal mass, hospice referral was made 3/10 . Palliative was also on the case  - newly diagnosed Mediastinal Mass : Subcarinal/Esophgeal Mass Suspicious for Neoplasia on CTA Chest - during previous admission     #HTN  #HLD   - D/c home meds    #Dementia  - c/w donepezil  - patient has short term memory forgetfulness. wanders at night  - Has aides at home      #MISC  - DVT PPx: not needed  - GI PPx: not needed  - Diet: DASH, mildly thick liquids  - Activity: AAT    Pending: marcin THAO 
89-year-old female with past medical history of hypertension, hyperlipidemia, CHF, pulmonary hypertension, aortic stenosis, possible COPD was discharged yesterday 3/10 on 2L home oxygen, history of DVT not on anticoagulation, newly diagnosed esophageal mass presented for evaluation shortness of breath, desatting to 80s on 4L NC. Denies fever, cough sore throat, chills, nausea, vomiting, shortness of breath, abdominal pain, urinary complaints.     #Chronic hypoxic respiratory failure - stable   #Suspected COPD - do not believe to be in exacerbation   #suspected fluid overload  #HFpEF  #Severe Pulmonary Hypertension- likely due to mass compressing on pulmonary artery   - started lasix 40mg IV daily - continue oral in am  - continue nebs  - continue advair  - s/s eval appreciated - regular/thins  - TTE 3/8/25:  EF 66%, severe Pulm HTN, mod AS   - ECG: Sinus bradycardia with 1st degree A-V block Rightward axis   - CXR - repeated 3/12- consistent with fluid overload   - patient is a former smoker - smoked for 60 years  - continue oxygen support at 2L NC    #Newly diagnosed mediastinal mass    - As family/daughter does not wish further work up for the mediastinal mass, hospice referral was made today  - Palliative evaluated patient last admission  - I completed GOC discussion with pt's daughter (HCP) on 3/12- DNR/DNI  - Hospice consulted - patient will be admitted to home Hospice on Monday at 10am and will be discharged at 8am on Monday  - family in agreement with plan of care  - family asked for 1:1 sit  - CMO status as of 3/14    #Hx HTN and HLD   #Dementia  - c/w donepezil  - frequent reorientation   - patient has short term memory forgetfulness. wanders at night  - Has 24hrs HHA at home  - continue current tx    #GOC - DNR/DNI/CMO    Progress Note Handoff  Pending Consults: Hospice  Pending Tests: none  Pending Results: none  Family Discussion: Discussed labs, meds, oxygen, hospice cs and overall plan of care with pt, her daughter and granddaughter (on 3/14) and medical staff. PFD for 48hrs home with hospice   Disposition: Home_x____/SNF______/Other_____/Unknown at this time_____  Spent 55 min reviewing chart, speaking with patient/family and on coordinating patient care during interdisciplinary rounds . 
89-year-old female with past medical history of hypertension, hyperlipidemia, CHF, pulmonary hypertension, aortic stenosis, possible COPD was discharged yesterday 3/10 on 2L home oxygen, history of DVT not on anticoagulation, newly diagnosed esophageal mass presented for evaluation shortness of breath, desatting to 80s on 4L NC. Palliative following for GOC.     Met with family today who agrees to CMO and home hospice. They would like to continue PO meds and to switch lasix to PO prior to d/c. Will add PRns for agitation and pain. See full recommendations below.     Education about palliative care provided to patient/family.  See Recs below.    Please call [phone] with questions or concerns 24/7.   We will continue to follow.   
89-year-old female with past medical history of hypertension, hyperlipidemia, CHF, pulmonary hypertension, aortic stenosis, possible COPD was discharged yesterday 3/10 on 2L home oxygen, history of DVT not on anticoagulation, newly diagnosed esophageal mass presented for evaluation shortness of breath, desatting to 80s on 4L NC. Palliative following for GOC.     Met with family today who agrees to CMO and home hospice. They would like to continue PO meds and to switch lasix to PO prior to d/c. Will add PRns for agitation and pain. See full recommendations below.     Education about palliative care provided to patient/family.  See Recs below.    Please call [phone] with questions or concerns 24/7.   We will continue to follow.

## 2025-03-17 NOTE — DISCHARGE NOTE NURSING/CASE MANAGEMENT/SOCIAL WORK - NSDCPEFALRISK_GEN_ALL_CORE
For information on Fall & Injury Prevention, visit: [email or web address] OR  [email or web address] OR  https://www.cdc.gov/steadi/patient.html

## 2025-03-18 DIAGNOSIS — I11.0 HYPERTENSIVE HEART DISEASE WITH HEART FAILURE: ICD-10-CM

## 2025-03-18 DIAGNOSIS — C15.9 MALIGNANT NEOPLASM OF ESOPHAGUS, UNSPECIFIED: ICD-10-CM

## 2025-03-18 DIAGNOSIS — D64.9 ANEMIA, UNSPECIFIED: ICD-10-CM

## 2025-03-18 DIAGNOSIS — Z11.52 ENCOUNTER FOR SCREENING FOR COVID-19: ICD-10-CM

## 2025-03-18 DIAGNOSIS — Z96.1 PRESENCE OF INTRAOCULAR LENS: ICD-10-CM

## 2025-03-18 DIAGNOSIS — K59.00 CONSTIPATION, UNSPECIFIED: ICD-10-CM

## 2025-03-18 DIAGNOSIS — I35.0 NONRHEUMATIC AORTIC (VALVE) STENOSIS: ICD-10-CM

## 2025-03-18 DIAGNOSIS — J96.01 ACUTE RESPIRATORY FAILURE WITH HYPOXIA: ICD-10-CM

## 2025-03-18 DIAGNOSIS — J44.9 CHRONIC OBSTRUCTIVE PULMONARY DISEASE, UNSPECIFIED: ICD-10-CM

## 2025-03-18 DIAGNOSIS — F17.210 NICOTINE DEPENDENCE, CIGARETTES, UNCOMPLICATED: ICD-10-CM

## 2025-03-18 DIAGNOSIS — I50.31 ACUTE DIASTOLIC (CONGESTIVE) HEART FAILURE: ICD-10-CM

## 2025-03-18 DIAGNOSIS — Z86.718 PERSONAL HISTORY OF OTHER VENOUS THROMBOSIS AND EMBOLISM: ICD-10-CM

## 2025-03-18 DIAGNOSIS — Z51.5 ENCOUNTER FOR PALLIATIVE CARE: ICD-10-CM

## 2025-03-18 DIAGNOSIS — E78.00 PURE HYPERCHOLESTEROLEMIA, UNSPECIFIED: ICD-10-CM

## 2025-03-18 DIAGNOSIS — Z66 DO NOT RESUSCITATE: ICD-10-CM

## 2025-03-18 DIAGNOSIS — Z98.42 CATARACT EXTRACTION STATUS, LEFT EYE: ICD-10-CM

## 2025-03-18 DIAGNOSIS — C38.3 MALIGNANT NEOPLASM OF MEDIASTINUM, PART UNSPECIFIED: ICD-10-CM

## 2025-03-24 NOTE — CDI QUERY NOTE - NSCDIOTHERTXTBX_GEN_ALL_CORE_HH
Clinical documentation and/or evidence in the medical record indicates that this patient has CHF.  In order to ensure accurate coding and accuracy of the clinical record, the documentation in this patient’s medical record requires additional clarification.      Please include more specific documentation as to the type of CHF in your progress note and/or discharge summary.    Specify Acuity:  • Fluid overload associated with mild HFpEF exacerbation  • Fluid overload without HFpEF exacerbation  • Other (specify)    Supporting documentation and/or clinical evidence:     3/13 Progress Note Adult-Internal Medicine Resident/Attending: ... Acute hypoxic respiratory failure 2/2 fluid overload vs COPD ex. Mild HFpEF ... started IV lasix 40 qd    3/17  Discharge Note Provider: ... was treated for COPD excaberation vs a fluid overload with diuretics, steroids, and nebulizers ... Suspected fluid overload. HFpEF. Severe Pulmonary Hypertension- likely due to mass compressing on pulmonary artery - started lasix 40mg IV daily > transition oral on dc for comfort ... PRINCIPAL DISCHARGE DIAGNOSIS: Fluid overload    Per MAR: Lasix 40mg IV push daily. Administered 3/12-3/15    Chest xray: 3/11-Increased bilateral interstitial opacities/edema.                   3/12-Mild CHF.                   3/13-Unchanged cardiomegaly and pulmonary congestion.    For reference, please see the [facility] Provider CHF Clinical Paper located on the [facility] Intranet - Clinical documentation improvement resources.      Thank you,  Dixie Agarwal RN [facility] CDS  [phone]

## 2025-04-26 NOTE — PHYSICAL THERAPY INITIAL EVALUATION ADULT - ORIENTATION, REHAB EVAL
Alert-The patient is alert, awake and responds to voice. The patient is oriented to time, place, and person. The triage nurse is able to obtain subjective information.
person/place